# Patient Record
Sex: MALE | Race: BLACK OR AFRICAN AMERICAN | NOT HISPANIC OR LATINO | Employment: FULL TIME | URBAN - METROPOLITAN AREA
[De-identification: names, ages, dates, MRNs, and addresses within clinical notes are randomized per-mention and may not be internally consistent; named-entity substitution may affect disease eponyms.]

---

## 2017-03-13 ENCOUNTER — HOSPITAL ENCOUNTER (EMERGENCY)
Facility: HOSPITAL | Age: 42
Discharge: HOME | End: 2017-03-13
Attending: EMERGENCY MEDICINE
Payer: COMMERCIAL

## 2017-03-13 VITALS
BODY MASS INDEX: 34.81 KG/M2 | RESPIRATION RATE: 18 BRPM | WEIGHT: 257 LBS | OXYGEN SATURATION: 100 % | HEART RATE: 93 BPM | SYSTOLIC BLOOD PRESSURE: 195 MMHG | TEMPERATURE: 98 F | HEIGHT: 72 IN | DIASTOLIC BLOOD PRESSURE: 110 MMHG

## 2017-03-13 DIAGNOSIS — R56.9 SEIZURE: Primary | ICD-10-CM

## 2017-03-13 DIAGNOSIS — R73.9 HYPERGLYCEMIA: ICD-10-CM

## 2017-03-13 DIAGNOSIS — Z91.199 NONCOMPLIANCE: ICD-10-CM

## 2017-03-13 DIAGNOSIS — I10 ESSENTIAL HYPERTENSION: ICD-10-CM

## 2017-03-13 LAB
ALBUMIN SERPL BCP-MCNC: 3.8 G/DL
ALP SERPL-CCNC: 132 U/L
ALT SERPL W/O P-5'-P-CCNC: 48 U/L
ANION GAP SERPL CALC-SCNC: 8 MMOL/L
AST SERPL-CCNC: 27 U/L
BACTERIA #/AREA URNS HPF: NORMAL /HPF
BASOPHILS # BLD AUTO: 0.02 K/UL
BASOPHILS NFR BLD: 0.2 %
BILIRUB SERPL-MCNC: 0.5 MG/DL
BILIRUB UR QL STRIP: NEGATIVE
BUN SERPL-MCNC: 10 MG/DL
CALCIUM SERPL-MCNC: 9.2 MG/DL
CHLORIDE SERPL-SCNC: 104 MMOL/L
CLARITY UR: CLEAR
CO2 SERPL-SCNC: 27 MMOL/L
COLOR UR: YELLOW
CREAT SERPL-MCNC: 1.1 MG/DL
DIFFERENTIAL METHOD: ABNORMAL
EOSINOPHIL # BLD AUTO: 0 K/UL
EOSINOPHIL NFR BLD: 0.5 %
ERYTHROCYTE [DISTWIDTH] IN BLOOD BY AUTOMATED COUNT: 12.8 %
EST. GFR  (AFRICAN AMERICAN): >60 ML/MIN/1.73 M^2
EST. GFR  (NON AFRICAN AMERICAN): >60 ML/MIN/1.73 M^2
GLUCOSE SERPL-MCNC: 333 MG/DL
GLUCOSE UR QL STRIP: ABNORMAL
HCT VFR BLD AUTO: 42.4 %
HGB BLD-MCNC: 14.5 G/DL
HGB UR QL STRIP: NEGATIVE
HYALINE CASTS #/AREA URNS LPF: 0 /LPF
KETONES UR QL STRIP: ABNORMAL
LEUKOCYTE ESTERASE UR QL STRIP: NEGATIVE
LYMPHOCYTES # BLD AUTO: 2 K/UL
LYMPHOCYTES NFR BLD: 22.2 %
MCH RBC QN AUTO: 27.6 PG
MCHC RBC AUTO-ENTMCNC: 34.2 %
MCV RBC AUTO: 81 FL
MICROSCOPIC COMMENT: NORMAL
MONOCYTES # BLD AUTO: 0.6 K/UL
MONOCYTES NFR BLD: 6.3 %
NEUTROPHILS # BLD AUTO: 6.2 K/UL
NEUTROPHILS NFR BLD: 70.8 %
NITRITE UR QL STRIP: NEGATIVE
PH UR STRIP: 6 [PH] (ref 5–8)
PHENYTOIN SERPL-MCNC: <0.1 UG/ML
PLATELET # BLD AUTO: 174 K/UL
PMV BLD AUTO: 11.4 FL
POCT GLUCOSE: 322 MG/DL (ref 70–110)
POTASSIUM SERPL-SCNC: 4.6 MMOL/L
PROT SERPL-MCNC: 7.2 G/DL
PROT UR QL STRIP: ABNORMAL
RBC # BLD AUTO: 5.26 M/UL
RBC #/AREA URNS HPF: 1 /HPF (ref 0–4)
SODIUM SERPL-SCNC: 139 MMOL/L
SP GR UR STRIP: 1.02 (ref 1–1.03)
URN SPEC COLLECT METH UR: ABNORMAL
UROBILINOGEN UR STRIP-ACNC: 1 EU/DL
WBC # BLD AUTO: 8.79 K/UL
WBC #/AREA URNS HPF: 0 /HPF (ref 0–5)
YEAST URNS QL MICRO: NORMAL

## 2017-03-13 PROCEDURE — 81000 URINALYSIS NONAUTO W/SCOPE: CPT

## 2017-03-13 PROCEDURE — 80053 COMPREHEN METABOLIC PANEL: CPT

## 2017-03-13 PROCEDURE — 85025 COMPLETE CBC W/AUTO DIFF WBC: CPT

## 2017-03-13 PROCEDURE — 80185 ASSAY OF PHENYTOIN TOTAL: CPT

## 2017-03-13 PROCEDURE — 82962 GLUCOSE BLOOD TEST: CPT

## 2017-03-13 PROCEDURE — 99284 EMERGENCY DEPT VISIT MOD MDM: CPT | Mod: 25

## 2017-03-13 PROCEDURE — 96361 HYDRATE IV INFUSION ADD-ON: CPT

## 2017-03-13 PROCEDURE — 96360 HYDRATION IV INFUSION INIT: CPT

## 2017-03-13 PROCEDURE — 25000003 PHARM REV CODE 250: Performed by: EMERGENCY MEDICINE

## 2017-03-13 RX ORDER — AMLODIPINE BESYLATE 10 MG/1
10 TABLET ORAL DAILY
Qty: 30 TABLET | Refills: 0 | Status: SHIPPED | OUTPATIENT
Start: 2017-03-13 | End: 2017-10-02

## 2017-03-13 RX ORDER — CLONIDINE HYDROCHLORIDE 0.1 MG/1
0.1 TABLET ORAL 2 TIMES DAILY
Qty: 60 TABLET | Refills: 0 | Status: SHIPPED | OUTPATIENT
Start: 2017-03-13 | End: 2017-04-12

## 2017-03-13 RX ORDER — LABETALOL 100 MG/1
100 TABLET, FILM COATED ORAL EVERY 12 HOURS
Qty: 60 TABLET | Refills: 0 | Status: SHIPPED | OUTPATIENT
Start: 2017-03-13 | End: 2017-04-12

## 2017-03-13 RX ORDER — CLONIDINE HYDROCHLORIDE 0.2 MG/1
0.2 TABLET ORAL
Status: COMPLETED | OUTPATIENT
Start: 2017-03-13 | End: 2017-03-13

## 2017-03-13 RX ORDER — METFORMIN HYDROCHLORIDE 500 MG/1
500 TABLET ORAL 2 TIMES DAILY WITH MEALS
Qty: 60 TABLET | Refills: 0 | Status: SHIPPED | OUTPATIENT
Start: 2017-03-13 | End: 2017-04-12

## 2017-03-13 RX ORDER — PHENYTOIN SODIUM 100 MG/1
200 CAPSULE, EXTENDED RELEASE ORAL 3 TIMES DAILY
Qty: 100 CAPSULE | Refills: 0 | Status: SHIPPED | OUTPATIENT
Start: 2017-03-13 | End: 2017-10-02 | Stop reason: SDUPTHER

## 2017-03-13 RX ORDER — PHENYTOIN SODIUM 100 MG/1
400 CAPSULE, EXTENDED RELEASE ORAL
Status: COMPLETED | OUTPATIENT
Start: 2017-03-13 | End: 2017-03-13

## 2017-03-13 RX ADMIN — CLONIDINE HYDROCHLORIDE 0.2 MG: 0.2 TABLET ORAL at 03:03

## 2017-03-13 RX ADMIN — PHENYTOIN SODIUM 400 MG: 100 CAPSULE, EXTENDED RELEASE ORAL at 03:03

## 2017-03-13 RX ADMIN — SODIUM CHLORIDE 1000 ML: 0.9 INJECTION, SOLUTION INTRAVENOUS at 01:03

## 2017-03-13 NOTE — DISCHARGE INSTRUCTIONS
"  Recurrent Seizure (Adult)    You have had another seizure today. A common cause of seizures that keep happening (recurrent seizures) is missing doses of seizure medicine. But sometimes seizures are hard to control even when you take the medicine correctly. If this is the case for you, your healthcare provider may need to increase your dosage. Or you may need to add or change to another medicine.  Home care  Follow these tips when caring for yourself at home. For this seizure:  · Seizures arent predictable. So avoid doing anything that might cause danger to you or other people if you have another seizure. Until the seizures are under good control, take these precautions:  ¨ Dont drive, ride a motorcycle, or ride a bike.  ¨ Dont operate dangerous equipment such as power tools  ¨ Take showers instead of baths.  ¨ Dont swim or climb ladders, trees, or roofs.  · Tell your close friends and relatives about your seizure. Teach them what to do for you if it happens again.  · If medicine was prescribed to prevent seizures, take it exactly as directed. It does not work when taken "as needed." Missing doses will increase the risk of having another seizure.  · If you miss a dose, take the missed dose as soon as you remember. If it is almost time for your next dose, skip the missed dose. Restart the medicine at your next scheduled time. Dont take extra medicine to make up the missed dose.  · Wear a "Medic-Alert" bracelet to let emergency personnel know about your condition.  · Follow a regular sleep schedule such that you get at least 6 to 8 hours of restful sleep every night. This is especially important when you are sick with a cold or flu and/or another type of infection.  For future seizures, if you are alone:  If you feel a seizure coming on, lie down on a bed or on the floor with something soft under your head. Lie on your left side, not on your back. This will keep you from falling. It will also let fluid drain out " of your mouth and prevent choking. Be sure you are clear of any objects that might injure you during the seizure. Call for help if there is time.  For future seizures, if someone is with you:  The person should help you get into a safe position and call for help. The person shouldnt try to force anything in your mouth once the seizure begins. This could harm your teeth or jaw.  Follow-up care  Follow up with your healthcare provider. Keep a seizure calendar to record how often you have a seizure. If you are being started on anti-seizure medicine, make sure that you use additional birth control. Seizure medicine can affect how well birth control pills work, and you could become pregnant. Avoid alcohol until your doctor tells you its OK.  Note: For the safety of yourself and others on the road, certain states require that the treating doctor tell the Public Health Department about any adult who is treated for a seizure and is at risk of more seizures. In this case, the Department of Motor Vehicles will be told. A restriction will be put on your s license until a doctor gives you medical clearance to drive again. Contact your treating doctor to find out if your state requires the reporting of patients with a seizures condition.  When to seek medical advice  Call your healthcare provider right away if any of these occur:  · Seizures happen more often or last longer than usual  · A seizure lasts over 5 minutes  · You dont wake up between seizures  · Confusion that lasts more than 30 minutes after a seizure  · Injury during a seizure  · Fever over 100.4ºF (38.0ºC), or as advised  · Unusual irritability, drowsiness, or confusion  · Stiff or painful neck  · Headache that gets worse   Date Last Reviewed: 8/1/2016  © 3780-0771 [employer]. [address] [place] [id number]. All rights reserved. This information is not intended as a substitute for professional medical care. Always follow your  healthcare professional's instructions.

## 2017-03-13 NOTE — ED PROVIDER NOTES
"SCRIBE #1 NOTE: I, Daniela Goldberg, am scribing for, and in the presence of, Onur Felipe MD. I have scribed the entire note.      History      Chief Complaint   Patient presents with    Seizures     c/o seizure x's 1 PTA. hx of seizures    Hyperglycemia     c/o "blood sugars high for a while."       Review of patient's allergies indicates:  No Known Allergies     HPI   HPI    3/13/2017, 1:46 PM   History obtained from the patient      History of Present Illness: Vadim Devries III is a 41 y.o. male patient who presents to the Emergency Department for seizures which onset this morning. Patient reports having 1 seizure this AM, states he takes Dilantin for seizures. Sx have been intermittent and moderate in severity. No mitigating or exacerbating factors reported.  Patient states his CBG has been between 300-600 within the last few weeks. Associated sx include fatigue. Pt denies any fever, chills, nausea, vomiting, dysuria, hematuria, urinary frequency, HA, dizziness, and all other sx. No further complaints or concerns at this time.        Arrival mode: ambulance    PCP: Petr Mckeon MD       Past Medical History:  Past Medical History:   Diagnosis Date    Diabetes mellitus     Hypertension     Seizures     Stroke        Past Surgical History:  Past surgical history reviewed not relevant      Family History:  Family history reviewed not relevant      Social History:  Social History     Social History Main Topics    Smoking status: Never Smoker    Smokeless tobacco: Not on file    Alcohol use No    Drug use: No    Sexual activity: Not on file       ROS   Review of Systems   Constitutional: Positive for fatigue. Negative for chills and fever.   HENT: Negative for sore throat.    Respiratory: Negative for shortness of breath.    Cardiovascular: Negative for chest pain.   Gastrointestinal: Negative for nausea and vomiting.   Genitourinary: Negative for dysuria, frequency and hematuria. "   Musculoskeletal: Negative for back pain.   Skin: Negative for rash.   Neurological: Positive for seizures. Negative for dizziness, weakness and headaches.   Hematological: Does not bruise/bleed easily.       Physical Exam    Initial Vitals   BP Pulse Resp Temp SpO2   03/13/17 1245 03/13/17 1245 03/13/17 1245 03/13/17 1245 03/13/17 1245   192/120 98 18 97.6 °F (36.4 °C) 98 %      Physical Exam  Nursing Notes and Vital Signs Reviewed.  Constitutional: Patient is in no acute distress. Awake and alert. Well-developed and well-nourished.  Head: Atraumatic. Normocephalic.  Eyes: PERRL. EOM intact. Conjunctivae are not pale. No scleral icterus.  ENT: Mucous membranes are moist. Oropharynx is clear and symmetric.    Neck: Supple. Full ROM. No lymphadenopathy.  Cardiovascular: Regular rate. Regular rhythm. No murmurs, rubs, or gallops. Distal pulses are 2+ and symmetri.  Pulmonary/Chest: No respiratory distress. Clear to auscultation bilaterally. No wheezing, rales, or rhonchi.  Abdominal: Soft and non-distended.  There is no tenderness.  No rebound, guarding, or rigidity. Good bowel sounds.  Genitourinary: No CVA tenderness  Musculoskeletal: Moves all extremities. No obvious deformities. No edema. No calf tenderness.  Skin: Warm and dry.  Neurological:  Alert, awake, and appropriate.  Normal speech.  No acute focal neurological deficits are appreciated.  Psychiatric: Normal affect. Good eye contact. Appropriate in content.    ED Course    Procedures  ED Vital Signs:  Vitals:    03/13/17 1245 03/13/17 1330 03/13/17 1445 03/13/17 1500   BP: (!) 192/120 (!) 180/96 (!) 180/101 (!) 191/126   Pulse: 98 84 82 87   Resp: 18 (!) 22 20 17   Temp: 97.6 °F (36.4 °C)      TempSrc: Oral      SpO2: 98% 98% 99% 99%   Weight: 116.6 kg (257 lb)      Height: 6' (1.829 m)       03/13/17 1515   BP:    Pulse:    Resp:    Temp: 98.4 °F (36.9 °C)   TempSrc: Oral   SpO2:    Weight:    Height:        Abnormal Lab Results:  Labs Reviewed   CBC W/  AUTO DIFFERENTIAL - Abnormal; Notable for the following:        Result Value    MCV 81 (*)     All other components within normal limits   COMPREHENSIVE METABOLIC PANEL - Abnormal; Notable for the following:     Glucose 333 (*)     ALT 48 (*)     All other components within normal limits   PHENYTOIN LEVEL, TOTAL - Abnormal; Notable for the following:     Phenytoin Lvl <0.1 (*)     All other components within normal limits   URINALYSIS - Abnormal; Notable for the following:     Protein, UA 1+ (*)     Glucose, UA 3+ (*)     Ketones, UA Trace (*)     All other components within normal limits   POCT GLUCOSE - Abnormal; Notable for the following:     POCT Glucose 322 (*)     All other components within normal limits   URINALYSIS MICROSCOPIC   POCT GLUCOSE MONITORING CONTINUOUS        All Lab Results:  Results for orders placed or performed during the hospital encounter of 03/13/17   CBC auto differential   Result Value Ref Range    WBC 8.79 3.90 - 12.70 K/uL    RBC 5.26 4.60 - 6.20 M/uL    Hemoglobin 14.5 14.0 - 18.0 g/dL    Hematocrit 42.4 40.0 - 54.0 %    MCV 81 (L) 82 - 98 fL    MCH 27.6 27.0 - 31.0 pg    MCHC 34.2 32.0 - 36.0 %    RDW 12.8 11.5 - 14.5 %    Platelets 174 150 - 350 K/uL    MPV 11.4 9.2 - 12.9 fL    Gran # 6.2 1.8 - 7.7 K/uL    Lymph # 2.0 1.0 - 4.8 K/uL    Mono # 0.6 0.3 - 1.0 K/uL    Eos # 0.0 0.0 - 0.5 K/uL    Baso # 0.02 0.00 - 0.20 K/uL    Gran% 70.8 38.0 - 73.0 %    Lymph% 22.2 18.0 - 48.0 %    Mono% 6.3 4.0 - 15.0 %    Eosinophil% 0.5 0.0 - 8.0 %    Basophil% 0.2 0.0 - 1.9 %    Differential Method Automated    Comprehensive metabolic panel   Result Value Ref Range    Sodium 139 136 - 145 mmol/L    Potassium 4.6 3.5 - 5.1 mmol/L    Chloride 104 95 - 110 mmol/L    CO2 27 23 - 29 mmol/L    Glucose 333 (H) 70 - 110 mg/dL    BUN, Bld 10 6 - 20 mg/dL    Creatinine 1.1 0.5 - 1.4 mg/dL    Calcium 9.2 8.7 - 10.5 mg/dL    Total Protein 7.2 6.0 - 8.4 g/dL    Albumin 3.8 3.5 - 5.2 g/dL    Total Bilirubin 0.5  0.1 - 1.0 mg/dL    Alkaline Phosphatase 132 55 - 135 U/L    AST 27 10 - 40 U/L    ALT 48 (H) 10 - 44 U/L    Anion Gap 8 8 - 16 mmol/L    eGFR if African American >60 >60 mL/min/1.73 m^2    eGFR if non African American >60 >60 mL/min/1.73 m^2   Phenytoin level, total   Result Value Ref Range    Phenytoin Lvl <0.1 (L) 10.0 - 20.0 ug/mL   Urinalysis   Result Value Ref Range    Specimen UA Urine, Clean Catch     Color, UA Yellow Yellow, Straw, Kinza    Appearance, UA Clear Clear    pH, UA 6.0 5.0 - 8.0    Specific Gravity, UA 1.020 1.005 - 1.030    Protein, UA 1+ (A) Negative    Glucose, UA 3+ (A) Negative    Ketones, UA Trace (A) Negative    Bilirubin (UA) Negative Negative    Occult Blood UA Negative Negative    Nitrite, UA Negative Negative    Urobilinogen, UA 1.0 <2.0 EU/dL    Leukocytes, UA Negative Negative   Urinalysis Microscopic   Result Value Ref Range    RBC, UA 1 0 - 4 /hpf    WBC, UA 0 0 - 5 /hpf    Bacteria, UA Rare None-Occ /hpf    Yeast, UA None None    Hyaline Casts, UA 0 0-1/lpf /lpf    Microscopic Comment SEE COMMENT    POCT glucose   Result Value Ref Range    POCT Glucose 322 (H) 70 - 110 mg/dL         Imaging Results:  Imaging Results         X-Ray Chest 1 View (Final result) Result time:  03/13/17 14:16:45    Final result by Carley Paredes MD (03/13/17 14:16:45)    Impression:      No acute findings.      Electronically signed by: CARLEY PAREDES MD  Date:     03/13/17  Time:    14:16     Narrative:    EXAM: CXR one view    CLINICAL HISTORY: Unspecified convulsions..    COMPARISON STUDIES: Chest x-ray 06/13/2016    FINDINGS:  Cardiomegaly.  Mild elevation right hemidiaphragm.  Lungs appear clear..  Ribs appear intact .                      The Emergency Provider reviewed the vital signs and test results, which are outlined above.    ED Discussion     4:20 PM: Discussed with pt all pertinent ED information and results. Discussed pt dx and plan of tx. Gave pt all f/u and return to the ED  instructions. All questions and concerns were addressed at this time. Pt expresses understanding of information and instructions, and is comfortable with plan to discharge. Pt is stable for discharge.    I discussed with patient and/or family/caretaker that evaluation in the ED does not suggest any emergent or life threatening medical conditions requiring immediate intervention beyond what was provided in the ED, and I believe patient is safe for discharge.  Regardless, an unremarkable evaluation in the ED does not preclude the development or presence of a serious of life threatening condition. As such, patient was instructed to return immediately for any worsening or change in current symptoms.      ED Medication(s):  Medications   sodium chloride 0.9% bolus 1,000 mL (1,000 mLs Intravenous New Bag 3/13/17 1352)   phenytoin (DILANTIN) ER capsule 400 mg (400 mg Oral Given 3/13/17 1555)   cloNIDine tablet 0.2 mg (0.2 mg Oral Given 3/13/17 1555)       New Prescriptions    AMLODIPINE (NORVASC) 10 MG TABLET    Take 1 tablet (10 mg total) by mouth once daily.    CLONIDINE (CATAPRES) 0.1 MG TABLET    Take 1 tablet (0.1 mg total) by mouth 2 (two) times daily.    LABETALOL (NORMODYNE) 100 MG TABLET    Take 1 tablet (100 mg total) by mouth every 12 (twelve) hours.    METFORMIN (GLUCOPHAGE) 500 MG TABLET    Take 1 tablet (500 mg total) by mouth 2 (two) times daily with meals.    PHENYTOIN (DILANTIN) 100 MG ER CAPSULE    Take 2 capsules (200 mg total) by mouth 3 (three) times daily.       Follow-up Information     Follow up with Petr Mckeon MD. Call in 1 day.    Specialty:  Family Medicine    Contact information:    [id number] [facility] [address]  [phone]            Pre-hypertension/Hypertension: The pt has been informed that they may have pre-hypertension or hypertension based on a blood pressure reading in the ED. I recommend that the pt call the PCP listed on their discharge instructions or  a physician of their choice this week to arrange f/u for further evaluation of possible pre-hypertension or hypertension.     Medical Decision Making    Medical Decision Making:   Clinical Tests:   Lab Tests: Ordered and Reviewed  Radiological Study: Ordered and Reviewed           Scribe Attestation:   Scribe #1: I performed the above scribed service and the documentation accurately describes the services I performed. I attest to the accuracy of the note.    Attending:   Physician Attestation Statement for Scribe #1: I, Onur Felipe MD, personally performed the services described in this documentation, as scribed by Daniela Goldberg, in my presence, and it is both accurate and complete.          Clinical Impression       ICD-10-CM ICD-9-CM   1. Seizure R56.9 780.39   2. Hyperglycemia R73.9 790.29   3. Essential hypertension I10 401.9   4. Noncompliance Z91.19 V15.81       Disposition:   Disposition: Discharged  Condition: Stable         Onur Felipe MD  03/14/17 3675

## 2017-03-13 NOTE — ED AVS SNAPSHOT
[facility]  [address]  [place] [address]               Vadim Devries III   3/13/2017  1:15 PM   ED    Description:  Male : 1975   Department:  [facility]            Your Care was Coordinated By:     Provider Role From To    Onur Felipe MD Attending Provider [phone] --      Reason for Visit     Seizures     Hyperglycemia           Diagnoses this Visit        Comments    Seizure    -  Primary     Hyperglycemia           ED Disposition     ED Disposition Condition Comment    Discharge             To Do List           Follow-up Information     Follow up with Petr Mckeon MD. Call in 1 day.    Specialty:  Family Medicine    Contact information:    [address] [facility] [id number] [phone]         These Medications        Disp Refills Start End    phenytoin (DILANTIN) 100 MG ER capsule 100 capsule 0 3/13/2017     Take 2 capsules (200 mg total) by mouth 3 (three) times daily. - Oral    amlodipine (NORVASC) 10 MG tablet 30 tablet 0 3/13/2017 3/13/2018    Take 1 tablet (10 mg total) by mouth once daily. - Oral    cloNIDine (CATAPRES) 0.1 MG tablet 60 tablet 0 3/13/2017 2017    Take 1 tablet (0.1 mg total) by mouth 2 (two) times daily. - Oral    metformin (GLUCOPHAGE) 500 MG tablet 60 tablet 0 3/13/2017 2017    Take 1 tablet (500 mg total) by mouth 2 (two) times daily with meals. - Oral    labetalol (NORMODYNE) 100 MG tablet 60 tablet 0 3/13/2017 2017    Take 1 tablet (100 mg total) by mouth every 12 (twelve) hours. - Oral      [facility]     [facility] On Call Nurse Care Line -  Assistance  Registered nurses in the [facility] On Call Center provide clinical advisement, health education, appointment booking, and other advisory services.  Call for this free service at [phone].             Medications           Message regarding Medications     Verify the changes  and/or additions to your medication regime listed below are the same as discussed with your clinician today.  If any of these changes or additions are incorrect, please notify your healthcare provider.        START taking these NEW medications        Refills    phenytoin (DILANTIN) 100 MG ER capsule 0    Sig: Take 2 capsules (200 mg total) by mouth 3 (three) times daily.    Class: Print    Route: Oral    amlodipine (NORVASC) 10 MG tablet 0    Sig: Take 1 tablet (10 mg total) by mouth once daily.    Class: Print    Route: Oral    cloNIDine (CATAPRES) 0.1 MG tablet 0    Sig: Take 1 tablet (0.1 mg total) by mouth 2 (two) times daily.    Class: Print    Route: Oral    metformin (GLUCOPHAGE) 500 MG tablet 0    Sig: Take 1 tablet (500 mg total) by mouth 2 (two) times daily with meals.    Class: Print    Route: Oral    labetalol (NORMODYNE) 100 MG tablet 0    Sig: Take 1 tablet (100 mg total) by mouth every 12 (twelve) hours.    Class: Print    Route: Oral      These medications were administered today        Dose Freq    sodium chloride 0.9% bolus 1,000 mL 1,000 mL ED 1 Time    Sig: Inject 1,000 mLs into the vein ED 1 Time.    Class: Normal    Route: Intravenous    phenytoin (DILANTIN) ER capsule 400 mg 400 mg ED 1 Time    Sig: Take 4 capsules (400 mg total) by mouth ED 1 Time.    Class: Normal    Route: Oral    cloNIDine tablet 0.2 mg 0.2 mg ED 1 Time    Sig: Take 1 tablet (0.2 mg total) by mouth ED 1 Time.    Class: Normal    Route: Oral           Verify that the below list of medications is an accurate representation of the medications you are currently taking.  If none reported, the list may be blank. If incorrect, please contact your healthcare provider. Carry this list with you in case of emergency.           Current Medications     amlodipine (NORVASC) 10 MG tablet Take 1 tablet (10 mg total) by mouth once daily.    amlodipine (NORVASC) 10 MG tablet Take 1 tablet (10 mg total) by mouth once daily.    cloNIDine  (CATAPRES) 0.1 MG tablet Take 1 tablet (0.1 mg total) by mouth 2 (two) times daily.    cloNIDine (CATAPRES) 0.1 MG tablet Take 1 tablet (0.1 mg total) by mouth 2 (two) times daily.    cloNIDine tablet 0.2 mg Take 1 tablet (0.2 mg total) by mouth ED 1 Time.    labetalol (NORMODYNE) 100 MG tablet Take 1 tablet (100 mg total) by mouth 2 (two) times daily.    labetalol (NORMODYNE) 100 MG tablet Take 1 tablet (100 mg total) by mouth every 12 (twelve) hours.    lisinopril 10 MG tablet Take 1 tablet (10 mg total) by mouth once daily.    metformin (GLUCOPHAGE) 500 MG tablet Take 1 tablet (500 mg total) by mouth 2 (two) times daily with meals.    metformin (GLUCOPHAGE) 500 MG tablet Take 1 tablet (500 mg total) by mouth 2 (two) times daily with meals.    phenytoin (DILANTIN) 100 MG ER capsule Take 2 capsules (200 mg total) by mouth 3 (three) times daily.    phenytoin (DILANTIN) 100 MG ER capsule Take 2 capsules (200 mg total) by mouth 3 (three) times daily.    phenytoin (DILANTIN) ER capsule 400 mg Take 4 capsules (400 mg total) by mouth ED 1 Time.    simvastatin (ZOCOR) 10 MG tablet Take 1 tablet (10 mg total) by mouth every evening.           Clinical Reference Information           Your Vitals Were     BP Pulse Temp Resp Height Weight    191/126 87 98.4 °F (36.9 °C) (Oral) 17 6' (1.829 m) 116.6 kg (257 lb)    SpO2 BMI             99% 34.86 kg/m2         Allergies as of 3/13/2017     No Known Allergies      Immunizations Administered on Date of Encounter - 3/13/2017     None      ED Micro, Lab, POCT     Start Ordered       Status Ordering Provider    03/13/17 1344 03/13/17 1343  CBC auto differential  STAT      Final result     03/13/17 1344 03/13/17 1343  Comprehensive metabolic panel  STAT      Final result     03/13/17 1344 03/13/17 1343  Phenytoin level, total  Once      Final result     03/13/17 1344 03/13/17 1343  Urinalysis  STAT      Final result     03/13/17 1343 03/13/17 1343  Urinalysis Microscopic  Once       "Final result     03/13/17 1341 03/13/17 1341  POCT glucose  Once      Final result     03/13/17 1320 03/13/17 1319  POCT glucose  Once      Acknowledged       ED Imaging Orders     Start Ordered       Status Ordering Provider    03/13/17 1344 03/13/17 1343  X-Ray Chest 1 View  1 time imaging      Final result         Discharge Instructions         Recurrent Seizure (Adult)    You have had another seizure today. A common cause of seizures that keep happening (recurrent seizures) is missing doses of seizure medicine. But sometimes seizures are hard to control even when you take the medicine correctly. If this is the case for you, your healthcare provider may need to increase your dosage. Or you may need to add or change to another medicine.  Home care  Follow these tips when caring for yourself at home. For this seizure:  · Seizures arent predictable. So avoid doing anything that might cause danger to you or other people if you have another seizure. Until the seizures are under good control, take these precautions:  ¨ Dont drive, ride a motorcycle, or ride a bike.  ¨ Dont operate dangerous equipment such as power tools  ¨ Take showers instead of baths.  ¨ Dont swim or climb ladders, trees, or roofs.  · Tell your close friends and relatives about your seizure. Teach them what to do for you if it happens again.  · If medicine was prescribed to prevent seizures, take it exactly as directed. It does not work when taken "as needed." Missing doses will increase the risk of having another seizure.  · If you miss a dose, take the missed dose as soon as you remember. If it is almost time for your next dose, skip the missed dose. Restart the medicine at your next scheduled time. Dont take extra medicine to make up the missed dose.  · Wear a "Medic-Alert" bracelet to let emergency personnel know about your condition.  · Follow a regular sleep schedule such that you get at least 6 to 8 hours of restful sleep every night. " This is especially important when you are sick with a cold or flu and/or another type of infection.  For future seizures, if you are alone:  If you feel a seizure coming on, lie down on a bed or on the floor with something soft under your head. Lie on your left side, not on your back. This will keep you from falling. It will also let fluid drain out of your mouth and prevent choking. Be sure you are clear of any objects that might injure you during the seizure. Call for help if there is time.  For future seizures, if someone is with you:  The person should help you get into a safe position and call for help. The person shouldnt try to force anything in your mouth once the seizure begins. This could harm your teeth or jaw.  Follow-up care  Follow up with your healthcare provider. Keep a seizure calendar to record how often you have a seizure. If you are being started on anti-seizure medicine, make sure that you use additional birth control. Seizure medicine can affect how well birth control pills work, and you could become pregnant. Avoid alcohol until your doctor tells you its OK.  Note: For the safety of yourself and others on the road, certain states require that the treating doctor tell the Public Health Department about any adult who is treated for a seizure and is at risk of more seizures. In this case, the Department of Motor Vehicles will be told. A restriction will be put on your s license until a doctor gives you medical clearance to drive again. Contact your treating doctor to find out if your state requires the reporting of patients with a seizures condition.  When to seek medical advice  Call your healthcare provider right away if any of these occur:  · Seizures happen more often or last longer than usual  · A seizure lasts over 5 minutes  · You dont wake up between seizures  · Confusion that lasts more than 30 minutes after a seizure  · Injury during a seizure  · Fever over 100.4ºF (38.0ºC),  or as advised  · Unusual irritability, drowsiness, or confusion  · Stiff or painful neck  · Headache that gets worse   Date Last Reviewed: 8/1/2016  © 8431-0724 [employer] [address] [place] [address], [place] [address]. All rights reserved. This information is not intended as a substitute for professional medical care. Always follow your healthcare professional's instructions.          [facility] Sign-Up     Activating your [facility] account is as easy as 1-2-3!     1) Visit [email or web address], select Sign Up Now, enter this activation code and your date of birth, then select Next.  [email or web address]  Expires: 4/27/2017  3:44 PM      2) Create a username and password to use when you visit [facility] in the future and select a security question in case you lose your password and select Next.    3) Enter your e-mail address and click Sign Up!    Additional Information  If you have questions, please e-mail [email or web address] or call [phone] to talk to our [facility] staff. Remember, [facility] is NOT to be used for urgent needs. For medical emergencies, dial 911.          [facility] complies with applicable Federal civil rights laws and does not discriminate on the basis of race, color, national origin, age, disability, or sex.        Language Assistance Services     ATTENTION: Language assistance services are available, free of charge. Please call [phone].      ATENCIÓN: Si habla español, tiene a clark disposición servicios gratuitos de asistencia lingüística. Llame al [phone].     CHÚ Ý: N?u b?n nói Ti?ng Vi?t, có các d?ch v? h? tr? ngôn ng? mi?n phí dành cho b?n. G?i s? [phone].

## 2017-03-13 NOTE — ED NOTES
"Pt requesting oral dilantin instead of IV cerebex. Pt states his body feels like "it is on fire" when he receives it IV. Discussed patient's request with Dr. Felipe. Dr. Felipe to change orders.  "

## 2017-04-11 ENCOUNTER — HOSPITAL ENCOUNTER (EMERGENCY)
Facility: HOSPITAL | Age: 42
Discharge: HOME | End: 2017-04-12
Attending: EMERGENCY MEDICINE
Payer: COMMERCIAL

## 2017-04-11 DIAGNOSIS — R91.1 PULMONARY NODULE: ICD-10-CM

## 2017-04-11 DIAGNOSIS — R07.9 CHEST PAIN: Primary | ICD-10-CM

## 2017-04-11 LAB
ALBUMIN SERPL BCP-MCNC: 3.6 G/DL
ALP SERPL-CCNC: 144 U/L
ALT SERPL W/O P-5'-P-CCNC: 50 U/L
ANION GAP SERPL CALC-SCNC: 11 MMOL/L
AST SERPL-CCNC: 25 U/L
B-OH-BUTYR BLD STRIP-SCNC: 0.1 MMOL/L
BACTERIA #/AREA URNS HPF: NORMAL /HPF
BASOPHILS # BLD AUTO: 0.03 K/UL
BASOPHILS NFR BLD: 0.4 %
BILIRUB SERPL-MCNC: 0.4 MG/DL
BILIRUB UR QL STRIP: NEGATIVE
BUN SERPL-MCNC: 9 MG/DL
CALCIUM SERPL-MCNC: 9.3 MG/DL
CHLORIDE SERPL-SCNC: 105 MMOL/L
CLARITY UR: CLEAR
CO2 SERPL-SCNC: 20 MMOL/L
COLOR UR: YELLOW
CREAT SERPL-MCNC: 1.2 MG/DL
DIFFERENTIAL METHOD: ABNORMAL
EOSINOPHIL # BLD AUTO: 0.2 K/UL
EOSINOPHIL NFR BLD: 1.8 %
ERYTHROCYTE [DISTWIDTH] IN BLOOD BY AUTOMATED COUNT: 12.5 %
EST. GFR  (AFRICAN AMERICAN): >60 ML/MIN/1.73 M^2
EST. GFR  (NON AFRICAN AMERICAN): >60 ML/MIN/1.73 M^2
GLUCOSE SERPL-MCNC: 439 MG/DL
GLUCOSE UR QL STRIP: ABNORMAL
HCT VFR BLD AUTO: 40.2 %
HGB BLD-MCNC: 14.1 G/DL
HGB UR QL STRIP: NEGATIVE
INR PPP: 1
KETONES UR QL STRIP: ABNORMAL
LEUKOCYTE ESTERASE UR QL STRIP: NEGATIVE
LYMPHOCYTES # BLD AUTO: 2.3 K/UL
LYMPHOCYTES NFR BLD: 27.6 %
MCH RBC QN AUTO: 27.8 PG
MCHC RBC AUTO-ENTMCNC: 35.1 %
MCV RBC AUTO: 79 FL
MICROSCOPIC COMMENT: NORMAL
MONOCYTES # BLD AUTO: 0.6 K/UL
MONOCYTES NFR BLD: 7.6 %
NEUTROPHILS # BLD AUTO: 5.2 K/UL
NEUTROPHILS NFR BLD: 62.6 %
NITRITE UR QL STRIP: NEGATIVE
PH UR STRIP: 6 [PH] (ref 5–8)
PLATELET # BLD AUTO: 161 K/UL
PMV BLD AUTO: 11 FL
POTASSIUM SERPL-SCNC: 3.5 MMOL/L
PROT SERPL-MCNC: 7 G/DL
PROT UR QL STRIP: ABNORMAL
PROTHROMBIN TIME: 10.3 SEC
RBC # BLD AUTO: 5.08 M/UL
SODIUM SERPL-SCNC: 136 MMOL/L
SP GR UR STRIP: 1.01 (ref 1–1.03)
TROPONIN I SERPL DL<=0.01 NG/ML-MCNC: <0.006 NG/ML
TROPONIN I SERPL DL<=0.01 NG/ML-MCNC: <0.006 NG/ML
URN SPEC COLLECT METH UR: ABNORMAL
UROBILINOGEN UR STRIP-ACNC: 1 EU/DL
WBC # BLD AUTO: 8.37 K/UL
YEAST URNS QL MICRO: NORMAL

## 2017-04-11 PROCEDURE — 80053 COMPREHEN METABOLIC PANEL: CPT

## 2017-04-11 PROCEDURE — 25000003 PHARM REV CODE 250: Performed by: EMERGENCY MEDICINE

## 2017-04-11 PROCEDURE — 85025 COMPLETE CBC W/AUTO DIFF WBC: CPT

## 2017-04-11 PROCEDURE — 81000 URINALYSIS NONAUTO W/SCOPE: CPT

## 2017-04-11 PROCEDURE — 99284 EMERGENCY DEPT VISIT MOD MDM: CPT | Mod: 25

## 2017-04-11 PROCEDURE — 84484 ASSAY OF TROPONIN QUANT: CPT

## 2017-04-11 PROCEDURE — 93010 ELECTROCARDIOGRAM REPORT: CPT | Mod: ,,, | Performed by: INTERNAL MEDICINE

## 2017-04-11 PROCEDURE — 93005 ELECTROCARDIOGRAM TRACING: CPT

## 2017-04-11 PROCEDURE — 96374 THER/PROPH/DIAG INJ IV PUSH: CPT

## 2017-04-11 PROCEDURE — 82010 KETONE BODYS QUAN: CPT

## 2017-04-11 PROCEDURE — 83880 ASSAY OF NATRIURETIC PEPTIDE: CPT

## 2017-04-11 PROCEDURE — 96361 HYDRATE IV INFUSION ADD-ON: CPT

## 2017-04-11 PROCEDURE — 63600175 PHARM REV CODE 636 W HCPCS: Performed by: EMERGENCY MEDICINE

## 2017-04-11 PROCEDURE — 85610 PROTHROMBIN TIME: CPT

## 2017-04-11 RX ORDER — HYDRALAZINE HYDROCHLORIDE 20 MG/ML
10 INJECTION INTRAMUSCULAR; INTRAVENOUS
Status: COMPLETED | OUTPATIENT
Start: 2017-04-11 | End: 2017-04-11

## 2017-04-11 RX ADMIN — SODIUM CHLORIDE 1000 ML: 0.9 INJECTION, SOLUTION INTRAVENOUS at 11:04

## 2017-04-11 RX ADMIN — HYDRALAZINE HYDROCHLORIDE 10 MG: 20 INJECTION INTRAMUSCULAR; INTRAVENOUS at 10:04

## 2017-04-11 RX ADMIN — SODIUM CHLORIDE 1000 ML: 0.9 INJECTION, SOLUTION INTRAVENOUS at 10:04

## 2017-04-11 NOTE — ED AVS SNAPSHOT
[facility]  [id number] [place] [address]               Vadim Devries III   2017  9:47 PM   ED    Description:  Male : 1975   Department:  [facility]           Your Care was Coordinated By:     Provider Role From To    Arben Chávez Jr., MD Attending Provider [phone] 17 [phone]    Connor Miramontes MD Attending Provider 17 --      Reason for Visit     Chest Pain           Diagnoses this Visit        Comments    Chest pain    -  Primary     Pulmonary nodule           ED Disposition     ED Disposition Condition Comment    Discharge             To Do List           Follow-up Information     Follow up with Petr Mckeon MD In 2 days.    Specialty:  Family Medicine    Contact information:    [address] [facility] [address]  [phone]          Follow up with [facility].    Specialty:  Emergency Medicine    Why:  As needed, If symptoms worsen    Contact information:    [address] [place] [id number] [phone]      [facility]     [facility] Care [facility] -  Assistance  Unless otherwise directed by your provider, please contact [facility], our nurse care line that is available for  assistance.     Registered nurses in the [facility] provide: appointment scheduling, clinical advisement, health education, and other advisory services.  Call: [phone] (toll free)               Medications           Message regarding Medications     Verify the changes and/or additions to your medication regime listed below are the same as discussed with your clinician today.  If any of these changes or additions are incorrect, please notify your healthcare provider.        These medications were administered today        Dose Freq    hydrALAZINE injection 10 mg 10 mg ED 1 Time    Sig: Inject 0.5 mLs (10 mg total) into the  vein ED 1 Time.    Class: Normal    Route: Intravenous    sodium chloride 0.9% bolus 1,000 mL 1,000 mL ED 1 Time    Sig: Inject 1,000 mLs into the vein ED 1 Time.    Class: Normal    Route: Intravenous    sodium chloride 0.9% bolus 1,000 mL 1,000 mL ED 1 Time    Sig: Inject 1,000 mLs into the vein ED 1 Time.    Class: Normal    Route: Intravenous    sodium chloride 0.9% bolus 1,000 mL 1,000 mL ED 1 Time    Sig: Inject 1,000 mLs into the vein ED 1 Time.    Class: Normal    Route: Intravenous    insulin regular injection 5 Units 5 Units ED 1 Time    Sig: Inject 5 Units into the vein ED 1 Time.    Class: Normal    Route: Intravenous    omnipaque 350 iohexol 100 mL 100 mL IMG once as needed    Sig: Inject 100 mLs into the vein ONCE PRN for contrast.    Class: Normal    Route: Intravenous           Verify that the below list of medications is an accurate representation of the medications you are currently taking.  If none reported, the list may be blank. If incorrect, please contact your healthcare provider. Carry this list with you in case of emergency.           Current Medications     amlodipine (NORVASC) 10 MG tablet Take 1 tablet (10 mg total) by mouth once daily.    amlodipine (NORVASC) 10 MG tablet Take 1 tablet (10 mg total) by mouth once daily.    cloNIDine (CATAPRES) 0.1 MG tablet Take 1 tablet (0.1 mg total) by mouth 2 (two) times daily.    cloNIDine (CATAPRES) 0.1 MG tablet Take 1 tablet (0.1 mg total) by mouth 2 (two) times daily.    labetalol (NORMODYNE) 100 MG tablet Take 1 tablet (100 mg total) by mouth 2 (two) times daily.    labetalol (NORMODYNE) 100 MG tablet Take 1 tablet (100 mg total) by mouth every 12 (twelve) hours.    lisinopril 10 MG tablet Take 1 tablet (10 mg total) by mouth once daily.    metformin (GLUCOPHAGE) 500 MG tablet Take 1 tablet (500 mg total) by mouth 2 (two) times daily with meals.    metformin (GLUCOPHAGE) 500 MG tablet Take 1 tablet (500 mg total) by mouth 2 (two) times daily  with meals.    phenytoin (DILANTIN) 100 MG ER capsule Take 2 capsules (200 mg total) by mouth 3 (three) times daily.    phenytoin (DILANTIN) 100 MG ER capsule Take 2 capsules (200 mg total) by mouth 3 (three) times daily.    simvastatin (ZOCOR) 10 MG tablet Take 1 tablet (10 mg total) by mouth every evening.           Clinical Reference Information           Your Vitals Were     BP Pulse Temp Resp Height Weight    169/95 94 97.6 °F (36.4 °C) (Oral) 17 6' (1.829 m) 116.6 kg (257 lb)    SpO2 BMI             98% 34.86 kg/m2         Allergies as of 4/12/2017     No Known Allergies      Immunizations Administered on Date of Encounter - 4/12/2017     None      ED Micro, Lab, POCT     Start Ordered       Status Ordering Provider    04/12/17 0358 04/12/17 0358  POCT glucose  Once      Final result     04/12/17 0351 04/12/17 0350  Troponin I  STAT      Final result     04/12/17 0224 04/12/17 0224  POCT glucose  Once      Final result     04/12/17 0146 04/12/17 0146  POCT glucose  Once      Final result     04/11/17 2237 04/11/17 2237  Urinalysis  STAT      Final result     04/11/17 2237 04/11/17 2237  Urinalysis Microscopic  Once      Final result     04/11/17 2201 04/11/17 2200  Beta - Hydroxybutyrate, Serum  Once      Final result     04/11/17 2200 04/11/17 2200  CBC auto differential  STAT      Final result     04/11/17 2200 04/11/17 2200  Comprehensive metabolic panel  STAT      Final result     04/11/17 2200 04/11/17 2200  Protime-INR  STAT      Final result     04/11/17 2200 04/11/17 2200  Troponin I  Now then every 3 hours     Comments:  PLEASE REVIEW ORDER START TIME BEFORE MARKING SPECIMEN COLLECTED.   Start Status   04/11/17 2200 Final result   04/12/17 0100 Final result       Acknowledged     04/11/17 2200 04/11/17 2200  B-Type natriuretic peptide (BNP)  STAT      Final result       ED Imaging Orders     Start Ordered       Status Ordering Provider    04/12/17 0209 04/12/17 0208  CTA Chest Non-Coronary (PE Study)   1 time imaging      In process     04/11/17 2200 04/11/17 2200  X-Ray Chest PA And Lateral  1 time imaging      Final result         Discharge Instructions         Uncertain Causes of Chest Pain    Chest pain can happen for a number of reasons. Sometimes the cause can't be determined. If your condition does not seem serious, and your pain does not appear to be coming from your heart, your healthcare provider may recommend watching it closely. Sometimes the signs of a serious problem take more time to appear. Many problems not related to your heart can cause chest pain.These include:  · Musculoskeletal. Costochondritis, an inflammation of the tissues around the ribs that can occur from trauma or overuse injuries  · Respiratory. Pneumonia, pneumothorax, or pneumonitis (inflammation of the lining of the chest and lungs)  · Gastrointestinal. Esophageal reflux, heartburn, or gallbladder disease  · Anxiety and panic disorders  · Nerve compression and neuritis  · Miscellaneous problems such as aortic aneurysm or pulmonary embolism (a blood clot in the lungs)  Home care  After your visit, follow these recommendations:  · Rest today and avoid strenuous activity.  · Take any prescribed medicine as directed.  · Be aware of any recurrent chest pain and notice any changes  Follow-up care  Follow up with your healthcare provider if you do not start to feel better within 24 hours, or as advised.  Call 911  Call 911 if any of these occur:  · A change in the type of pain: if it feels different, becomes more severe, lasts longer, or begins to spread into your shoulder, arm, neck, jaw or back  · Shortness of breath or increased pain with breathing  · Weakness, dizziness, or fainting  · Rapid heart beat  · Crushing sensation in your chest  When to seek medical advice  Call your healthcare provider right away if any of the following occur:  · Cough with dark colored sputum (phlegm) or blood  · Fever of 100.4ºF (38ºC) or higher, or as  directed by your healthcare provider  · Swelling, pain or redness in one leg  · Shortness of breath  Date Last Reviewed: 12/30/2015  © 2157-2671 [employer]. [address] [place] [address], [place] [address]. All rights reserved. This information is not intended as a substitute for professional medical care. Always follow your healthcare professional's instructions.          [facility] Sign-Up     Activating your [facility] account is as easy as 1-2-3!     1) Visit [email or web address], select Sign Up Now, enter this activation code and your date of birth, then select Next.  [email or web address]  Expires: 4/27/2017  3:44 PM      2) Create a username and password to use when you visit [facility] in the future and select a security question in case you lose your password and select Next.    3) Enter your e-mail address and click Sign Up!    Additional Information  If you have questions, please e-mail [email or web address] or call [phone] to talk to our [facility] staff. Remember, [facility] is NOT to be used for urgent needs. For medical emergencies, dial 911.          [facility] complies with applicable Federal civil rights laws and does not discriminate on the basis of race, color, national origin, age, disability, or sex.        Language Assistance Services     ATTENTION: Language assistance services are available, free of charge. Please call [phone].      ATENCIÓN: Si habla español, tiene a clark disposición servicios gratuitos de asistencia lingüística. [place] al [phone].     CHÚ Ý: N?u b?n nói Ti?ng Vi?t, có các d?ch v? h? tr? ngôn ng? mi?n phí dành cho b?n. G?i s? [phone].

## 2017-04-12 VITALS
SYSTOLIC BLOOD PRESSURE: 163 MMHG | DIASTOLIC BLOOD PRESSURE: 103 MMHG | HEART RATE: 91 BPM | WEIGHT: 257 LBS | BODY MASS INDEX: 34.81 KG/M2 | RESPIRATION RATE: 15 BRPM | HEIGHT: 72 IN | OXYGEN SATURATION: 99 % | TEMPERATURE: 98 F

## 2017-04-12 LAB
BNP SERPL-MCNC: <10 PG/ML
POCT GLUCOSE: 226 MG/DL (ref 70–110)
POCT GLUCOSE: 309 MG/DL (ref 70–110)
POCT GLUCOSE: 311 MG/DL (ref 70–110)
TROPONIN I SERPL DL<=0.01 NG/ML-MCNC: 0.01 NG/ML

## 2017-04-12 PROCEDURE — 63600175 PHARM REV CODE 636 W HCPCS: Performed by: EMERGENCY MEDICINE

## 2017-04-12 PROCEDURE — 25500020 PHARM REV CODE 255: Performed by: EMERGENCY MEDICINE

## 2017-04-12 PROCEDURE — 25000003 PHARM REV CODE 250: Performed by: EMERGENCY MEDICINE

## 2017-04-12 PROCEDURE — 84484 ASSAY OF TROPONIN QUANT: CPT

## 2017-04-12 PROCEDURE — 82962 GLUCOSE BLOOD TEST: CPT

## 2017-04-12 RX ADMIN — INSULIN HUMAN 5 UNITS: 100 INJECTION, SOLUTION PARENTERAL at 02:04

## 2017-04-12 RX ADMIN — IOHEXOL 100 ML: 350 INJECTION, SOLUTION INTRAVENOUS at 02:04

## 2017-04-12 RX ADMIN — SODIUM CHLORIDE 1000 ML: 0.9 INJECTION, SOLUTION INTRAVENOUS at 02:04

## 2017-04-12 NOTE — ED PROVIDER NOTES
SCRIBE #1 NOTE: I, Ralph Simmons, am scribing for, and in the presence of, Arben Chávez Jr., MD. I have scribed the HPI, ROS, PEx, and ED discussion until pt handoff.    SCRIBE #2 NOTE: I, Corinne Mack, am scribing for, and in the presence of,  Connor Miramontes MD. I have scribed the remaining portions of the note not scribed by Scribe #1.     History      Chief Complaint   Patient presents with    Chest Pain     x2hrs. relieved with nitro       Review of patient's allergies indicates:  No Known Allergies     HPI   HPI    4/11/2017, 10:02 PM   History obtained from the patient and EMS      History of Present Illness: Vadim Devries III is a 41 y.o. male patient who presents to the Emergency Department for chest pain which onset gradually 2 hours ago. Sx are constant and moderate in severity.  EMS reports pt stated pain improved after tx with NTG x2 and 325 mg ASA given en route. EMS noted blood sugar at 370. Sx are described as pressure. Sx worse with deep inspiration. There are no other mitigating or exacerbating factors noted. Associated sx include dry cough and SOB.  Pt denies any  diaphoresis, palpitations, extremity weakness/numbness, leg pain/swelling, HA, dizziness, N/V/D, fever and all other sx at this time. No further complaints or concerns at this time.       Arrival mode:  EMS      PCP: Petr Mckeon MD       Past Medical History:  Past Medical History:   Diagnosis Date    Diabetes mellitus     Hypertension     Seizures     Stroke          Past Surgical History:  Past surgical history reviewed not relevant      Family History:  Family history reviewed not relevant          Social History Main Topics    Social History Main Topics    Smoking status: Unknown if ever smoked    Smokeless tobacco: Unknown if ever used    Alcohol Use: Unknown drinking history    Drug Use: Unknown if ever used    Sexual Activity: Unknown       Social History:  Social History     Social History Main Topics    Smoking  status: Never Smoker    Smokeless tobacco: Not given    Alcohol use No    Drug use: No    Sexual activity: Not given       ROS   Review of Systems   Constitutional: Negative for chills, diaphoresis and fever.   HENT: Negative for sore throat.    Respiratory: Positive for cough and shortness of breath. Negative for apnea and choking.    Cardiovascular: Positive for chest pain. Negative for palpitations and leg swelling.   Gastrointestinal: Negative for abdominal pain, diarrhea, nausea and vomiting.   Genitourinary: Negative for dysuria.   Musculoskeletal: Negative for back pain.   Skin: Negative for rash.   Neurological: Negative for dizziness, weakness, numbness and headaches.   Hematological: Does not bruise/bleed easily.   All other systems reviewed and are negative.      Physical Exam    Initial Vitals   BP Pulse Resp Temp SpO2   04/11/17 2147 04/11/17 2147 04/11/17 2147 04/11/17 2147 --   151/101 110 18 97.6 °F (36.4 °C)       Physical Exam  Nursing Notes and Vital Signs Reviewed.  Constitutional: Patient is in no acute distress. Awake and alert. Well-developed and well-nourished.  Head: Atraumatic. Normocephalic.  Eyes: PERRL. EOM intact. Conjunctivae are not pale. No scleral icterus.  ENT: Mucous membranes are moist. Oropharynx is clear and symmetric.    Neck: Supple. Full ROM. No lymphadenopathy.  Cardiovascular: tachycardic. Regular rhythm. No murmurs, rubs, or gallops. Distal pulses are 2+ and symmetric.  Pulmonary/Chest: No respiratory distress. Clear to auscultation bilaterally. No wheezing, rales, or rhonchi.  Abdominal: Soft and non-distended.  There is no tenderness.  No rebound, guarding, or rigidity.  Good bowel sounds.   Musculoskeletal: Moves all extremities. No obvious deformities. No edema. No calf tenderness.  Skin: Warm and dry.  Neurological:  Alert, awake, and appropriate.  Normal speech.  No acute focal neurological deficits are appreciated.  Psychiatric: Normal affect. Good eye contact.  Appropriate in content.    ED Course    Procedures  ED Vital Signs:  Vitals:    04/11/17 2147 04/11/17 2200 04/11/17 2302 04/11/17 2332   BP: (!) 151/101  (!) 121/107 (!) 121/103   Pulse: 110 102 (!) 111 108   Resp: 18  17 15   Temp: 97.6 °F (36.4 °C)      TempSrc: Oral      SpO2:   99% 98%   Weight: 116.6 kg (257 lb)      Height: 6' (1.829 m)       04/11/17 2347 04/12/17 0017 04/12/17 0047 04/12/17 0232   BP: (!) 174/96 (!) 159/97 (!) 133/97 (!) 151/83   Pulse: 107 106 99 99   Resp: 18 18 15 18   Temp:       TempSrc:       SpO2: 99% 99% 98% 100%   Weight:       Height:        04/12/17 0402 04/12/17 0532   BP: (!) 169/95 (!) 163/103   Pulse: 94 91   Resp: 17 15   Temp:  98.4 °F (36.9 °C)   TempSrc:     SpO2: 98% 99%   Weight:     Height:         Abnormal Lab Results:  Labs Reviewed   CBC W/ AUTO DIFFERENTIAL - Abnormal; Notable for the following:        Result Value    MCV 79 (*)     All other components within normal limits    Narrative:     PLEASE REVIEW ORDER START TIME BEFORE MARKING SPECIMEN  COLLECTED.   COMPREHENSIVE METABOLIC PANEL - Abnormal; Notable for the following:     CO2 20 (*)     Glucose 439 (*)     Alkaline Phosphatase 144 (*)     ALT 50 (*)     All other components within normal limits    Narrative:     PLEASE REVIEW ORDER START TIME BEFORE MARKING SPECIMEN  COLLECTED.   URINALYSIS - Abnormal; Notable for the following:     Protein, UA Trace (*)     Glucose, UA 3+ (*)     Ketones, UA Trace (*)     All other components within normal limits   POCT GLUCOSE - Abnormal; Notable for the following:     POCT Glucose 311 (*)     All other components within normal limits   POCT GLUCOSE - Abnormal; Notable for the following:     POCT Glucose 309 (*)     All other components within normal limits   POCT GLUCOSE - Abnormal; Notable for the following:     POCT Glucose 226 (*)     All other components within normal limits   PROTIME-INR    Narrative:     PLEASE REVIEW ORDER START TIME BEFORE MARKING  SPECIMEN  COLLECTED.   TROPONIN I    Narrative:     PLEASE REVIEW ORDER START TIME BEFORE MARKING SPECIMEN  COLLECTED.   B-TYPE NATRIURETIC PEPTIDE    Narrative:     PLEASE REVIEW ORDER START TIME BEFORE MARKING SPECIMEN  COLLECTED.   BETA - HYDROXYBUTYRATE, SERUM    Narrative:     PLEASE REVIEW ORDER START TIME BEFORE MARKING SPECIMEN  COLLECTED.   TROPONIN I    Narrative:     PLEASE REVIEW ORDER START TIME BEFORE MARKING SPECIMEN  COLLECTED.   URINALYSIS MICROSCOPIC   TROPONIN I        All Lab Results:  Results for orders placed or performed during the hospital encounter of 04/11/17   CBC auto differential   Result Value Ref Range    WBC 8.37 3.90 - 12.70 K/uL    RBC 5.08 4.60 - 6.20 M/uL    Hemoglobin 14.1 14.0 - 18.0 g/dL    Hematocrit 40.2 40.0 - 54.0 %    MCV 79 (L) 82 - 98 fL    MCH 27.8 27.0 - 31.0 pg    MCHC 35.1 32.0 - 36.0 %    RDW 12.5 11.5 - 14.5 %    Platelets 161 150 - 350 K/uL    MPV 11.0 9.2 - 12.9 fL    Gran # 5.2 1.8 - 7.7 K/uL    Lymph # 2.3 1.0 - 4.8 K/uL    Mono # 0.6 0.3 - 1.0 K/uL    Eos # 0.2 0.0 - 0.5 K/uL    Baso # 0.03 0.00 - 0.20 K/uL    Gran% 62.6 38.0 - 73.0 %    Lymph% 27.6 18.0 - 48.0 %    Mono% 7.6 4.0 - 15.0 %    Eosinophil% 1.8 0.0 - 8.0 %    Basophil% 0.4 0.0 - 1.9 %    Differential Method Automated    Comprehensive metabolic panel   Result Value Ref Range    Sodium 136 136 - 145 mmol/L    Potassium 3.5 3.5 - 5.1 mmol/L    Chloride 105 95 - 110 mmol/L    CO2 20 (L) 23 - 29 mmol/L    Glucose 439 (H) 70 - 110 mg/dL    BUN, Bld 9 6 - 20 mg/dL    Creatinine 1.2 0.5 - 1.4 mg/dL    Calcium 9.3 8.7 - 10.5 mg/dL    Total Protein 7.0 6.0 - 8.4 g/dL    Albumin 3.6 3.5 - 5.2 g/dL    Total Bilirubin 0.4 0.1 - 1.0 mg/dL    Alkaline Phosphatase 144 (H) 55 - 135 U/L    AST 25 10 - 40 U/L    ALT 50 (H) 10 - 44 U/L    Anion Gap 11 8 - 16 mmol/L    eGFR if African American >60 >60 mL/min/1.73 m^2    eGFR if non African American >60 >60 mL/min/1.73 m^2   Protime-INR   Result Value Ref Range     Prothrombin Time 10.3 9.0 - 12.5 sec    INR 1.0 0.8 - 1.2   Troponin I   Result Value Ref Range    Troponin I <0.006 0.000 - 0.026 ng/mL   B-Type natriuretic peptide (BNP)   Result Value Ref Range    BNP <10 0 - 99 pg/mL   Beta - Hydroxybutyrate, Serum   Result Value Ref Range    Beta-Hydroxybutyrate 0.1 0.0 - 0.5 mmol/L   Troponin I   Result Value Ref Range    Troponin I <0.006 0.000 - 0.026 ng/mL   Urinalysis   Result Value Ref Range    Specimen UA Urine, Clean Catch     Color, UA Yellow Yellow, Straw, Kinza    Appearance, UA Clear Clear    pH, UA 6.0 5.0 - 8.0    Specific Gravity, UA 1.010 1.005 - 1.030    Protein, UA Trace (A) Negative    Glucose, UA 3+ (A) Negative    Ketones, UA Trace (A) Negative    Bilirubin (UA) Negative Negative    Occult Blood UA Negative Negative    Nitrite, UA Negative Negative    Urobilinogen, UA 1.0 <2.0 EU/dL    Leukocytes, UA Negative Negative   Urinalysis Microscopic   Result Value Ref Range    Bacteria, UA None None-Occ /hpf    Yeast, UA None None    Microscopic Comment SEE COMMENT    Troponin I   Result Value Ref Range    Troponin I 0.007 0.000 - 0.026 ng/mL   POCT glucose   Result Value Ref Range    POCT Glucose 311 (H) 70 - 110 mg/dL   POCT glucose   Result Value Ref Range    POCT Glucose 309 (H) 70 - 110 mg/dL   POCT glucose   Result Value Ref Range    POCT Glucose 226 (H) 70 - 110 mg/dL         Imaging Results:  Imaging Results         CTA Chest Non-Coronary (PE Study) (In process) 1. There is no acute cardiopulmonary process. There is no pulmonary embolism.  2. There is a 12 mm hyperdense nodule in the anterior superior right hepatic lobe on axial image 144. There is a 13 mm hyperdense superior lateral left hepatic lobe nodule on axial image 149. For patients with low to average risk of malignancy, no further follow-up is necessary. For patients with high risk of malignancy, recommend follow-up CT or MR in 6 months or multiphasic MR to evaluate further.        X-Ray Chest PA  And Lateral (Final result) Result time:  04/11/17 22:14:30    Final result by Dick Brewer MD (04/11/17 22:14:30)    Impression:         Negative chest radiograph.            Electronically signed by: DICK BREWER MD  Date:     04/11/17  Time:    22:14     Narrative:    Exam: XR CHEST PA AND LATERAL    Clinical History:   Acute onset  Chest Pain    Findings:   The lungs are clear. The cardiac silhouette is within normal limits.             The EKG was ordered, reviewed, and independently interpreted by the ED provider.  Interpretation time: 22:02  Rate: 102 BPM  Rhythm: sinus tachycardia  Interpretation: No STEMI.         The Emergency Provider reviewed the vital signs and test results, which are outlined above.    ED Discussion          1:50 AM: Re-evaluated pt. Pt is resting comfortably and is in no acute distress.  Pt is still tachycardic. He states he is still SOB.  D/w pt all pertinent results. D/w pt any concerns expressed at this time. Answered all questions. Pt expresses understanding at this time.      2:19 AM: Pt is A&O x3, appropriate and competent at this time. Pt voices desire to leave hospital. D/w pt in length need for further evaluation and treatment due to HPI and PEx.  All risks, including worsening sx, permanent bodily harm and death, were discussed in length. After information was discussed, pt decided to stay here for further evaluation. All questions and concerns addressed at this time.       2:22 AM: Dr. Chávez transfers care of pt to Dr. Miramontes, pending CTA chest results.      5:07 AM: Reassessed pt at this time. Discussed with pt all pertinent ED information and results. Discussed pt dx and plan of tx. Gave pt all f/u and return to the ED instructions. All questions and concerns were addressed at this time. Pt expresses understanding of information and instructions, and is comfortable with plan to discharge. Pt is stable for discharge.      I discussed with patient and/or  family/caretaker that evaluation in the ED does not suggest any emergent or life threatening medical conditions requiring immediate intervention beyond what was provided in the ED, and I believe patient is safe for discharge.  Regardless, an unremarkable evaluation in the ED does not preclude the development or presence of a serious of life threatening condition. As such, patient was instructed to return immediately for any worsening or change in current symptoms.        ED Medication(s):  Medications   hydrALAZINE injection 10 mg (10 mg Intravenous Given 4/11/17 2235)   sodium chloride 0.9% bolus 1,000 mL (0 mLs Intravenous Stopped 4/12/17 0143)   sodium chloride 0.9% bolus 1,000 mL (0 mLs Intravenous Stopped 4/12/17 0125)   sodium chloride 0.9% bolus 1,000 mL (0 mLs Intravenous Stopped 4/12/17 0355)   insulin regular injection 5 Units (5 Units Intravenous Given 4/12/17 0254)   omnipaque 350 iohexol 100 mL (100 mLs Intravenous Given 4/12/17 0248)       Discharge Medication List as of 4/12/2017  5:02 AM          Follow-up Information     Follow up with Petr Mckeon MD In 2 days.    Specialty:  Family Medicine    Contact information:    [id number] [facility] [id number] [phone]          Follow up with [facility] - .    Specialty:  Emergency Medicine    Why:  As needed, If symptoms worsen    Contact information:    [id number] [facility] [address] [phone]             Medical Decision Making    Medical Decision Making:   Clinical Tests:   Lab Tests: Reviewed and Ordered  Radiological Study: Reviewed and Ordered  Medical Tests: Reviewed and Ordered           Scribe Attestation:   Scribe #1: I performed the above scribed service and the documentation accurately describes the services I performed. I attest to the accuracy of the note.    Attending:   Physician Attestation Statement for Scribe #1: I, Arben Chávez Jr., MD, personally performed  the services described in this documentation, as scribed by Ralph Simmons in my presence, and it is both accurate and complete.       Scribe Attestation:   Scribe #2: I performed the above scribed service and the documentation accurately describes the services I performed. I attest to the accuracy of the note.    Attending Attestation:           Physician Attestation for Scribe:    Physician Attestation Statement for Scribe #2: I, Connor Miramontes MD, reviewed documentation, as scribed by Corinne Mack in my presence, and it is both accurate and complete. I also acknowledge and confirm the content of the note done by Scribe #1.          Clinical Impression       ICD-10-CM ICD-9-CM   1. Chest pain R07.9 786.50   2. Pulmonary nodule R91.1 793.11       Disposition:   Disposition: Discharged  Condition: Stable         Connor Miramontes MD  04/19/17 1930

## 2017-04-12 NOTE — ED NOTES
Pt is currently resting in bed. NAD noted. AAO x 3. RR e/u, airway open and patent. Bed in locked and low position, side rails up x 2, call light within reach, will continue to monitor.

## 2017-04-12 NOTE — ED NOTES
Pt is currently resting in bed. NAD noted. AAO x 3. RR e/u, airway open and patent. Will continue with discharge.

## 2017-04-12 NOTE — DISCHARGE INSTRUCTIONS

## 2017-10-02 ENCOUNTER — HOSPITAL ENCOUNTER (EMERGENCY)
Facility: HOSPITAL | Age: 42
Discharge: HOME | End: 2017-10-02
Attending: EMERGENCY MEDICINE
Payer: COMMERCIAL

## 2017-10-02 VITALS
WEIGHT: 245 LBS | HEIGHT: 72 IN | OXYGEN SATURATION: 99 % | HEART RATE: 93 BPM | DIASTOLIC BLOOD PRESSURE: 91 MMHG | SYSTOLIC BLOOD PRESSURE: 147 MMHG | TEMPERATURE: 98 F | RESPIRATION RATE: 15 BRPM | BODY MASS INDEX: 33.18 KG/M2

## 2017-10-02 DIAGNOSIS — Z91.199 NONCOMPLIANCE WITH DIET AND MEDICATION REGIMEN: ICD-10-CM

## 2017-10-02 DIAGNOSIS — I10 HYPERTENSION: ICD-10-CM

## 2017-10-02 DIAGNOSIS — G40.909 SEIZURE DISORDER: Primary | ICD-10-CM

## 2017-10-02 DIAGNOSIS — I10 CHRONIC HYPERTENSION: ICD-10-CM

## 2017-10-02 DIAGNOSIS — Z91.148 NONCOMPLIANCE WITH DIET AND MEDICATION REGIMEN: ICD-10-CM

## 2017-10-02 LAB
ALBUMIN SERPL BCP-MCNC: 3.5 G/DL
ALP SERPL-CCNC: 126 U/L
ALT SERPL W/O P-5'-P-CCNC: 45 U/L
AMPHET+METHAMPHET UR QL: NEGATIVE
ANION GAP SERPL CALC-SCNC: 10 MMOL/L
AST SERPL-CCNC: 26 U/L
BARBITURATES UR QL SCN>200 NG/ML: NEGATIVE
BASOPHILS # BLD AUTO: 0.02 K/UL
BASOPHILS NFR BLD: 0.2 %
BENZODIAZ UR QL SCN>200 NG/ML: NEGATIVE
BILIRUB SERPL-MCNC: 0.6 MG/DL
BUN SERPL-MCNC: 10 MG/DL
BZE UR QL SCN: NEGATIVE
CALCIUM SERPL-MCNC: 9 MG/DL
CANNABINOIDS UR QL SCN: NEGATIVE
CHLORIDE SERPL-SCNC: 103 MMOL/L
CO2 SERPL-SCNC: 25 MMOL/L
CREAT SERPL-MCNC: 1.1 MG/DL
CREAT UR-MCNC: 257.6 MG/DL
DIFFERENTIAL METHOD: ABNORMAL
EOSINOPHIL # BLD AUTO: 0.1 K/UL
EOSINOPHIL NFR BLD: 0.6 %
ERYTHROCYTE [DISTWIDTH] IN BLOOD BY AUTOMATED COUNT: 12.8 %
EST. GFR  (AFRICAN AMERICAN): >60 ML/MIN/1.73 M^2
EST. GFR  (NON AFRICAN AMERICAN): >60 ML/MIN/1.73 M^2
ETHANOL SERPL-MCNC: <10 MG/DL
GLUCOSE SERPL-MCNC: 291 MG/DL
HCT VFR BLD AUTO: 43.2 %
HGB BLD-MCNC: 14.7 G/DL
LYMPHOCYTES # BLD AUTO: 1.9 K/UL
LYMPHOCYTES NFR BLD: 23.6 %
MAGNESIUM SERPL-MCNC: 1.8 MG/DL
MCH RBC QN AUTO: 26.9 PG
MCHC RBC AUTO-ENTMCNC: 34 G/DL
MCV RBC AUTO: 79 FL
METHADONE UR QL SCN>300 NG/ML: NEGATIVE
MONOCYTES # BLD AUTO: 0.6 K/UL
MONOCYTES NFR BLD: 6.9 %
NEUTROPHILS # BLD AUTO: 5.6 K/UL
NEUTROPHILS NFR BLD: 68.7 %
OPIATES UR QL SCN: NEGATIVE
PCP UR QL SCN>25 NG/ML: NEGATIVE
PHENYTOIN SERPL-MCNC: <0.1 UG/ML
PLATELET # BLD AUTO: 167 K/UL
PMV BLD AUTO: 11.3 FL
POCT GLUCOSE: 275 MG/DL (ref 70–110)
POTASSIUM SERPL-SCNC: 3.6 MMOL/L
PROT SERPL-MCNC: 7.1 G/DL
RBC # BLD AUTO: 5.46 M/UL
SODIUM SERPL-SCNC: 138 MMOL/L
TOXICOLOGY INFORMATION: NORMAL
WBC # BLD AUTO: 8.21 K/UL

## 2017-10-02 PROCEDURE — 80053 COMPREHEN METABOLIC PANEL: CPT

## 2017-10-02 PROCEDURE — 80307 DRUG TEST PRSMV CHEM ANLYZR: CPT

## 2017-10-02 PROCEDURE — 82962 GLUCOSE BLOOD TEST: CPT

## 2017-10-02 PROCEDURE — 99285 EMERGENCY DEPT VISIT HI MDM: CPT | Mod: 25

## 2017-10-02 PROCEDURE — 93005 ELECTROCARDIOGRAM TRACING: CPT

## 2017-10-02 PROCEDURE — 83735 ASSAY OF MAGNESIUM: CPT

## 2017-10-02 PROCEDURE — 93010 ELECTROCARDIOGRAM REPORT: CPT | Mod: ,,, | Performed by: INTERNAL MEDICINE

## 2017-10-02 PROCEDURE — 85025 COMPLETE CBC W/AUTO DIFF WBC: CPT

## 2017-10-02 PROCEDURE — 80185 ASSAY OF PHENYTOIN TOTAL: CPT

## 2017-10-02 PROCEDURE — 80320 DRUG SCREEN QUANTALCOHOLS: CPT

## 2017-10-02 PROCEDURE — 63600175 PHARM REV CODE 636 W HCPCS: Performed by: EMERGENCY MEDICINE

## 2017-10-02 PROCEDURE — 25000003 PHARM REV CODE 250: Performed by: EMERGENCY MEDICINE

## 2017-10-02 PROCEDURE — 96365 THER/PROPH/DIAG IV INF INIT: CPT

## 2017-10-02 RX ORDER — AMLODIPINE BESYLATE 10 MG/1
10 TABLET ORAL DAILY
Qty: 30 TABLET | Refills: 0 | Status: SHIPPED | OUTPATIENT
Start: 2017-10-02 | End: 2018-04-23

## 2017-10-02 RX ORDER — LABETALOL 100 MG/1
100 TABLET, FILM COATED ORAL 2 TIMES DAILY
Qty: 30 TABLET | Refills: 0 | Status: SHIPPED | OUTPATIENT
Start: 2017-10-02 | End: 2018-04-23

## 2017-10-02 RX ORDER — CLONIDINE HYDROCHLORIDE 0.1 MG/1
0.1 TABLET ORAL 2 TIMES DAILY
Qty: 60 TABLET | Refills: 0 | Status: SHIPPED | OUTPATIENT
Start: 2017-10-02 | End: 2018-04-23

## 2017-10-02 RX ORDER — CLONIDINE HYDROCHLORIDE 0.2 MG/1
0.2 TABLET ORAL
Status: COMPLETED | OUTPATIENT
Start: 2017-10-02 | End: 2017-10-02

## 2017-10-02 RX ORDER — LABETALOL HYDROCHLORIDE 5 MG/ML
20 INJECTION, SOLUTION INTRAVENOUS
Status: DISCONTINUED | OUTPATIENT
Start: 2017-10-02 | End: 2017-10-02

## 2017-10-02 RX ORDER — METFORMIN HYDROCHLORIDE 500 MG/1
500 TABLET ORAL 2 TIMES DAILY WITH MEALS
Qty: 60 TABLET | Refills: 0 | Status: SHIPPED | OUTPATIENT
Start: 2017-10-02 | End: 2018-04-23

## 2017-10-02 RX ORDER — LISINOPRIL 10 MG/1
10 TABLET ORAL DAILY
Qty: 30 TABLET | Refills: 0 | Status: SHIPPED | OUTPATIENT
Start: 2017-10-02 | End: 2018-04-23

## 2017-10-02 RX ORDER — PHENYTOIN SODIUM 100 MG/1
200 CAPSULE, EXTENDED RELEASE ORAL 3 TIMES DAILY
Qty: 90 CAPSULE | Refills: 0 | Status: SHIPPED | OUTPATIENT
Start: 2017-10-02 | End: 2018-04-23

## 2017-10-02 RX ADMIN — DEXTROSE 1500 MG PE: 50 INJECTION, SOLUTION INTRAVENOUS at 01:10

## 2017-10-02 RX ADMIN — CLONIDINE HYDROCHLORIDE 0.2 MG: 0.2 TABLET ORAL at 01:10

## 2017-10-02 NOTE — ED PROVIDER NOTES
SCRIBE #1 NOTE: I, Trevin Talbot Suzy, am scribing for, and in the presence of, Maritza Driver MD. I have scribed the entire note.      History      Chief Complaint   Patient presents with    Seizures     pt had a seizure while at work today, pt uses a w/c due to paralysis from CVA, pt states it was a focal seizure; pt given 10mg labetolol by EMS due to HTN, pt states he has been out of his HTN meds       Review of patient's allergies indicates:  No Known Allergies     HPI   HPI    10/2/2017, 11:54 AM   History obtained from the patient      History of Present Illness: Vadim Devries III is a 42 y.o. male patient with a PMHx of SZ, CVA, who presents to the Emergency Department for SZ which onset suddenly PTA. Sxs are episodic and moderate in severity. 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 Pt denies fall and said that his students helped him to the floor. There are no mitigating or exacerbating factors noted.  Pt denies any fever, N/V/D, head trauma, CP, SOB, HA, numbness, incontinence, and all other sxs at this time. Prior Tx includes 10 mg labetolol en route. Pt reports he has been non compliant with HTN medication and SZ medication because he cannot afford his Rx. Pt states that he took the last of his clonidine yesterday. No further complaints or concerns at this time.       Arrival mode: EMS      PCP: Petr Mckeon MD       Past Medical History:  Past Medical History:   Diagnosis Date    Diabetes mellitus     Hypertension     Seizures     Stroke        Past Surgical History:  History reviewed. No pertinent surgical history.      Family History:  History reviewed. No pertinent family history.    Social History:  Social History     Social History Main Topics    Smoking status: Never Smoker    Smokeless tobacco: Never Used    Alcohol use No    Drug use: No    Sexual activity: unknown       ROS   Review of Systems   Constitutional:  Negative for fever.   HENT: Negative for sore throat.    Respiratory: Negative for shortness of breath.    Cardiovascular: Negative for chest pain.   Gastrointestinal: Negative for nausea.   Genitourinary: Negative for dysuria.   Musculoskeletal: Negative for back pain.   Skin: Negative for rash.   Neurological: Positive for seizures. Negative for dizziness, facial asymmetry, weakness and numbness.   Hematological: Does not bruise/bleed easily.     Physical Exam      Initial Vitals [10/02/17 1142]   BP Pulse Resp Temp SpO2   (!) 203/126 92 18 98.3 °F (36.8 °C) 99 %      MAP       151.67          Physical Exam  Nursing Notes and Vital Signs Reviewed.  Constitutional: Patient is in no acute distress. Well-developed and well-nourished.  Head: Atraumatic. Normocephalic.  Eyes: PERRL. EOM intact. Conjunctivae are not pale. No scleral icterus.  ENT: Mucous membranes are moist. Oropharynx is clear and symmetric.    Neck: Supple. Full ROM. No lymphadenopathy.  Cardiovascular: Regular rate. Regular rhythm. No murmurs, rubs, or gallops. Distal pulses are 2+ and symmetric.  Pulmonary/Chest: No respiratory distress. Clear to auscultation bilaterally. No wheezing, rales, or rhonchi.  Abdominal: Soft and non-distended.  There is no tenderness.  No rebound, guarding, or rigidity. Good bowel sounds.  Genitourinary: No CVA tenderness  Musculoskeletal: No obvious deformities. No edema. No calf tenderness.  Skin: Warm and dry.  Neurological:  Alert, awake, and appropriate.  Normal speech.  No acute focal neurological deficits are appreciated.  Psychiatric: Normal affect. Good eye contact. Appropriate in content.    ED Course    Procedures  ED Vital Signs:  Vitals:    10/02/17 1142 10/02/17 1155 10/02/17 1220 10/02/17 1250   BP: (!) 203/126 (!) 209/107 (!) 226/159 (!) 217/121   Pulse: 92 86 82 82   Resp: 18 16 14 11   Temp: 98.3 °F (36.8 °C)      TempSrc: Oral      SpO2: 99% 98% 100% 100%   Weight: 111.1 kg (245 lb)      Height: 6'  (1.829 m)       10/02/17 1305 10/02/17 1355 10/02/17 1415 10/02/17 1431   BP: (!) 233/127 (!) 211/124 (!) 182/110 (!) 186/107   Pulse: 94 84 88 94   Resp: 12 17 17 17   Temp:       TempSrc:       SpO2: 100% 99% 98% 99%   Weight:       Height:           Abnormal Lab Results:  Labs Reviewed   CBC W/ AUTO DIFFERENTIAL - Abnormal; Notable for the following:        Result Value    MCV 79 (*)     MCH 26.9 (*)     All other components within normal limits   COMPREHENSIVE METABOLIC PANEL - Abnormal; Notable for the following:     Glucose 291 (*)     ALT 45 (*)     All other components within normal limits   PHENYTOIN LEVEL, TOTAL - Abnormal; Notable for the following:     Phenytoin Lvl <0.1 (*)     All other components within normal limits   POCT GLUCOSE - Abnormal; Notable for the following:     POCT Glucose 275 (*)     All other components within normal limits   MAGNESIUM   DRUG SCREEN PANEL, URINE EMERGENCY   ALCOHOL,MEDICAL (ETHANOL)   POCT GLUCOSE        All Lab Results:  Results for orders placed or performed during the hospital encounter of 10/02/17   CBC W/ AUTO DIFFERENTIAL   Result Value Ref Range    WBC 8.21 3.90 - 12.70 K/uL    RBC 5.46 4.60 - 6.20 M/uL    Hemoglobin 14.7 14.0 - 18.0 g/dL    Hematocrit 43.2 40.0 - 54.0 %    MCV 79 (L) 82 - 98 fL    MCH 26.9 (L) 27.0 - 31.0 pg    MCHC 34.0 32.0 - 36.0 g/dL    RDW 12.8 11.5 - 14.5 %    Platelets 167 150 - 350 K/uL    MPV 11.3 9.2 - 12.9 fL    Gran # 5.6 1.8 - 7.7 K/uL    Lymph # 1.9 1.0 - 4.8 K/uL    Mono # 0.6 0.3 - 1.0 K/uL    Eos # 0.1 0.0 - 0.5 K/uL    Baso # 0.02 0.00 - 0.20 K/uL    Gran% 68.7 38.0 - 73.0 %    Lymph% 23.6 18.0 - 48.0 %    Mono% 6.9 4.0 - 15.0 %    Eosinophil% 0.6 0.0 - 8.0 %    Basophil% 0.2 0.0 - 1.9 %    Differential Method Automated    Comprehensive metabolic panel   Result Value Ref Range    Sodium 138 136 - 145 mmol/L    Potassium 3.6 3.5 - 5.1 mmol/L    Chloride 103 95 - 110 mmol/L    CO2 25 23 - 29 mmol/L    Glucose 291 (H) 70 - 110  mg/dL    BUN, Bld 10 6 - 20 mg/dL    Creatinine 1.1 0.5 - 1.4 mg/dL    Calcium 9.0 8.7 - 10.5 mg/dL    Total Protein 7.1 6.0 - 8.4 g/dL    Albumin 3.5 3.5 - 5.2 g/dL    Total Bilirubin 0.6 0.1 - 1.0 mg/dL    Alkaline Phosphatase 126 55 - 135 U/L    AST 26 10 - 40 U/L    ALT 45 (H) 10 - 44 U/L    Anion Gap 10 8 - 16 mmol/L    eGFR if African American >60 >60 mL/min/1.73 m^2    eGFR if non African American >60 >60 mL/min/1.73 m^2   Magnesium   Result Value Ref Range    Magnesium 1.8 1.6 - 2.6 mg/dL   Drug screen panel, emergency   Result Value Ref Range    Benzodiazepines Negative     Methadone metabolites Negative     Cocaine (Metab.) Negative     Opiate Scrn, Ur Negative     Barbiturate Screen, Ur Negative     Amphetamine Screen, Ur Negative     THC Negative     Phencyclidine Negative     Creatinine, Random Ur 257.6 23.0 - 375.0 mg/dL    Toxicology Information SEE COMMENT    Ethanol level   Result Value Ref Range    Alcohol, Medical, Serum <10 <10 mg/dL   Phenytoin level, total   Result Value Ref Range    Phenytoin Lvl <0.1 (L) 10.0 - 20.0 ug/mL   POCT glucose   Result Value Ref Range    POCT Glucose 275 (H) 70 - 110 mg/dL            The EKG was ordered, reviewed, and independently interpreted by the ED provider.  Interpretation time: 12:35  Rate: 72 BPM  Rhythm: normal sinus rhythm  Interpretation: No acute ST changes. No STEMI.      The Emergency Provider reviewed the vital signs and test results, which are outlined above.    ED Discussion     2:35 PM: Reassessed pt. Pt states their condition has improved at this time. Pt requesting refills of all his at home medications. Discussed with pt all pertinent ED information and results. Discussed plan of treatment with pt. Gave pt all f/u and return to the ED instructions. All questions and concerns were addressed at this time. Pt understands and agrees to plan as discussed. Pt is stable for discharge.       Patient presents with seizure or seizure-like symptoms. I have  no suspicion of an intracranial, traumatic, infectious, metabolic, toxic, cardiovascular (specifically arrhythmia), or other emergent medical condition requiring further intervention. Seizure precautions were discussed with the patient and/or caretaker, specifically not to swim unattended, not to operate motor vehicles or other machinery, and to avoid heights or other areas where falls may occur until cleared by primary care physician. Patient is safe for discharge.    ED Medication(s):  Medications   fosphenytoin (CEREBYX) 1,500 mg PE in dextrose 5 % 100 mL IVPB (0 mg PE Intravenous Stopped 10/2/17 1359)   cloNIDine tablet 0.2 mg (0.2 mg Oral Given 10/2/17 1302)       Current Discharge Medication List          Follow-up Information     Petr Mckeon MD. Schedule an appointment as soon as possible for a visit in 2 days.    Specialty:  Family Medicine  Why:  Return to the Emergency Room, If symptoms worsen  Contact information:  [address] [facility] [place] [address]  [phone]                     Medical Decision Making    Medical Decision Making:   Clinical Tests:   Radiological Study: Reviewed and Ordered  Medical Tests: Reviewed and Ordered           Scribe Attestation:   Scribe #1: I performed the above scribed service and the documentation accurately describes the services I performed. I attest to the accuracy of the note.    Attending:   Physician Attestation Statement for Scribe #1: I, Maritza Driver MD, personally performed the services described in this documentation, as scribed by Trevin Almonte, in my presence, and it is both accurate and complete.          Clinical Impression       ICD-10-CM ICD-9-CM   1. Seizure disorder G40.909 345.90   2. Hypertension I10 401.9   3. Chronic hypertension I10 401.9   4. Noncompliance with diet and medication regimen Z91.11 V15.81    Z91.14        Disposition:   Disposition: Discharged  Condition: Stable         Maritza Driver  MD  10/02/17 1526

## 2018-04-23 ENCOUNTER — HOSPITAL ENCOUNTER (EMERGENCY)
Facility: HOSPITAL | Age: 43
Discharge: HOME | End: 2018-04-23
Attending: SPECIALIST
Payer: COMMERCIAL

## 2018-04-23 VITALS
RESPIRATION RATE: 19 BRPM | BODY MASS INDEX: 34.58 KG/M2 | WEIGHT: 255 LBS | HEART RATE: 82 BPM | SYSTOLIC BLOOD PRESSURE: 100 MMHG | DIASTOLIC BLOOD PRESSURE: 55 MMHG | TEMPERATURE: 98 F | OXYGEN SATURATION: 99 %

## 2018-04-23 DIAGNOSIS — S09.90XA INJURY OF HEAD, INITIAL ENCOUNTER: ICD-10-CM

## 2018-04-23 DIAGNOSIS — I10 ESSENTIAL HYPERTENSION: ICD-10-CM

## 2018-04-23 DIAGNOSIS — R56.9 SEIZURE: Primary | ICD-10-CM

## 2018-04-23 LAB
ALBUMIN SERPL BCP-MCNC: 4.1 G/DL
ALP SERPL-CCNC: 147 U/L
ALT SERPL W/O P-5'-P-CCNC: 41 U/L
ANION GAP SERPL CALC-SCNC: 11 MMOL/L
AST SERPL-CCNC: 23 U/L
BASOPHILS # BLD AUTO: 0.04 K/UL
BASOPHILS NFR BLD: 0.4 %
BILIRUB SERPL-MCNC: 0.6 MG/DL
BUN SERPL-MCNC: 9 MG/DL
CALCIUM SERPL-MCNC: 9.8 MG/DL
CHLORIDE SERPL-SCNC: 104 MMOL/L
CK SERPL-CCNC: 154 U/L
CO2 SERPL-SCNC: 24 MMOL/L
CREAT SERPL-MCNC: 1 MG/DL
DIFFERENTIAL METHOD: ABNORMAL
EOSINOPHIL # BLD AUTO: 0.1 K/UL
EOSINOPHIL NFR BLD: 0.8 %
ERYTHROCYTE [DISTWIDTH] IN BLOOD BY AUTOMATED COUNT: 12.6 %
EST. GFR  (AFRICAN AMERICAN): >60 ML/MIN/1.73 M^2
EST. GFR  (NON AFRICAN AMERICAN): >60 ML/MIN/1.73 M^2
GLUCOSE SERPL-MCNC: 296 MG/DL
HCT VFR BLD AUTO: 44 %
HGB BLD-MCNC: 15.1 G/DL
LYMPHOCYTES # BLD AUTO: 1.6 K/UL
LYMPHOCYTES NFR BLD: 15.5 %
MAGNESIUM SERPL-MCNC: 2 MG/DL
MCH RBC QN AUTO: 27.4 PG
MCHC RBC AUTO-ENTMCNC: 34.3 G/DL
MCV RBC AUTO: 80 FL
MONOCYTES # BLD AUTO: 0.6 K/UL
MONOCYTES NFR BLD: 5.7 %
NEUTROPHILS # BLD AUTO: 8.1 K/UL
NEUTROPHILS NFR BLD: 77.6 %
PHENYTOIN SERPL-MCNC: <0.1 UG/ML
PLATELET # BLD AUTO: 170 K/UL
PMV BLD AUTO: 11.3 FL
POTASSIUM SERPL-SCNC: 4 MMOL/L
PROT SERPL-MCNC: 7.9 G/DL
RBC # BLD AUTO: 5.51 M/UL
SODIUM SERPL-SCNC: 139 MMOL/L
TROPONIN I SERPL DL<=0.01 NG/ML-MCNC: 0.01 NG/ML
WBC # BLD AUTO: 10.47 K/UL

## 2018-04-23 PROCEDURE — 82550 ASSAY OF CK (CPK): CPT

## 2018-04-23 PROCEDURE — 96365 THER/PROPH/DIAG IV INF INIT: CPT

## 2018-04-23 PROCEDURE — 63600175 PHARM REV CODE 636 W HCPCS: Performed by: SPECIALIST

## 2018-04-23 PROCEDURE — 93010 ELECTROCARDIOGRAM REPORT: CPT | Mod: ,,, | Performed by: INTERNAL MEDICINE

## 2018-04-23 PROCEDURE — 80053 COMPREHEN METABOLIC PANEL: CPT

## 2018-04-23 PROCEDURE — 85025 COMPLETE CBC W/AUTO DIFF WBC: CPT

## 2018-04-23 PROCEDURE — 96375 TX/PRO/DX INJ NEW DRUG ADDON: CPT

## 2018-04-23 PROCEDURE — 80185 ASSAY OF PHENYTOIN TOTAL: CPT

## 2018-04-23 PROCEDURE — 99285 EMERGENCY DEPT VISIT HI MDM: CPT | Mod: 25

## 2018-04-23 PROCEDURE — 83735 ASSAY OF MAGNESIUM: CPT

## 2018-04-23 PROCEDURE — 25000003 PHARM REV CODE 250: Performed by: SPECIALIST

## 2018-04-23 PROCEDURE — 84484 ASSAY OF TROPONIN QUANT: CPT

## 2018-04-23 RX ORDER — LABETALOL HYDROCHLORIDE 5 MG/ML
20 INJECTION, SOLUTION INTRAVENOUS
Status: COMPLETED | OUTPATIENT
Start: 2018-04-23 | End: 2018-04-23

## 2018-04-23 RX ORDER — PHENYTOIN SODIUM 100 MG/1
200 CAPSULE, EXTENDED RELEASE ORAL 3 TIMES DAILY
Qty: 180 CAPSULE | Refills: 0 | Status: SHIPPED | OUTPATIENT
Start: 2018-04-23 | End: 2019-09-27 | Stop reason: SDUPTHER

## 2018-04-23 RX ORDER — CLONIDINE HYDROCHLORIDE 0.2 MG/1
0.2 TABLET ORAL
Status: COMPLETED | OUTPATIENT
Start: 2018-04-23 | End: 2018-04-23

## 2018-04-23 RX ORDER — DIPHENHYDRAMINE HYDROCHLORIDE 50 MG/ML
12.5 INJECTION INTRAMUSCULAR; INTRAVENOUS
Status: COMPLETED | OUTPATIENT
Start: 2018-04-23 | End: 2018-04-23

## 2018-04-23 RX ORDER — LISINOPRIL 10 MG/1
10 TABLET ORAL DAILY
Qty: 30 TABLET | Refills: 0 | Status: SHIPPED | OUTPATIENT
Start: 2018-04-23 | End: 2019-09-27 | Stop reason: SDUPTHER

## 2018-04-23 RX ORDER — SIMVASTATIN 10 MG/1
10 TABLET, FILM COATED ORAL NIGHTLY
Qty: 30 TABLET | Refills: 0 | Status: SHIPPED | OUTPATIENT
Start: 2018-04-23 | End: 2019-09-27 | Stop reason: SDUPTHER

## 2018-04-23 RX ORDER — CLONIDINE HYDROCHLORIDE 0.1 MG/1
0.1 TABLET ORAL 2 TIMES DAILY
Qty: 60 TABLET | Refills: 0 | Status: SHIPPED | OUTPATIENT
Start: 2018-04-23 | End: 2019-09-27 | Stop reason: SDUPTHER

## 2018-04-23 RX ORDER — AMLODIPINE BESYLATE 10 MG/1
10 TABLET ORAL DAILY
Qty: 30 TABLET | Refills: 0 | Status: SHIPPED | OUTPATIENT
Start: 2018-04-23 | End: 2019-09-27 | Stop reason: SDUPTHER

## 2018-04-23 RX ORDER — ACETAMINOPHEN 10 MG/ML
1000 INJECTION, SOLUTION INTRAVENOUS
Status: COMPLETED | OUTPATIENT
Start: 2018-04-23 | End: 2018-04-23

## 2018-04-23 RX ORDER — METFORMIN HYDROCHLORIDE 500 MG/1
500 TABLET ORAL 2 TIMES DAILY WITH MEALS
Qty: 60 TABLET | Refills: 0 | Status: SHIPPED | OUTPATIENT
Start: 2018-04-23 | End: 2019-09-27 | Stop reason: SDUPTHER

## 2018-04-23 RX ORDER — LABETALOL 100 MG/1
100 TABLET, FILM COATED ORAL 2 TIMES DAILY
Qty: 30 TABLET | Refills: 0 | Status: SHIPPED | OUTPATIENT
Start: 2018-04-23 | End: 2019-09-27 | Stop reason: SDUPTHER

## 2018-04-23 RX ADMIN — LABETALOL HYDROCHLORIDE 20 MG: 5 INJECTION, SOLUTION INTRAVENOUS at 03:04

## 2018-04-23 RX ADMIN — CLONIDINE HYDROCHLORIDE 0.2 MG: 0.2 TABLET ORAL at 05:04

## 2018-04-23 RX ADMIN — ACETAMINOPHEN 1000 MG: 10 INJECTION, SOLUTION INTRAVENOUS at 03:04

## 2018-04-23 RX ADMIN — DIPHENHYDRAMINE HYDROCHLORIDE 12.5 MG: 50 INJECTION, SOLUTION INTRAMUSCULAR; INTRAVENOUS at 04:04

## 2018-04-23 RX ADMIN — DEXTROSE MONOHYDRATE 1155 MG PE: 5 INJECTION, SOLUTION INTRAVENOUS at 04:04

## 2018-04-23 NOTE — ED PROVIDER NOTES
SCRIBE #1 NOTE: I, Julienne Miramontes, am scribing for, and in the presence of, Stacie Claros MD. I have scribed the HPI, ROS, and PEx.     SCRIBE #2 NOTE: I, Geeta Lacey, am scribing for, and in the presence of,  Stacie Claros MD. I have scribed the remaining portions of the note not scribed by Scribe #1.     History      Chief Complaint   Patient presents with    Seizures     pt fell out of wheelchair during seizure       Review of patient's allergies indicates:  No Known Allergies     HPI   HPI    4/23/2018, 2:30 PM   History obtained from the patient      History of Present Illness: Vadim Devries III is a 42 y.o. 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 Pt states he fell out of the wheelchair and hit his head during his seizure. Symptoms are episodic and moderate in severity. No mitigating or exacerbating factors reported. Associated sxs include headache. Patient denies any tongue biting, drooling, dizziness, confusion, urinary or fecal incontinence, fever, chills, and all other sxs at this time. Prior Tx includes Dilantin. Pt reports running out of his meds for a month. No further complaints or concerns at this time.         Arrival mode: EMS      PCP: Petr Mckeon MD       Past Medical History:  Past Medical History:   Diagnosis Date    Diabetes mellitus     Hypertension     Seizures     Stroke      Past Surgical History:  History reviewed. No pertinent surgical history.    Past Family History:  History reviewed. No pertinent family history.    Social History:  Social History     Social History Main Topics    Smoking status: Never Smoker    Smokeless tobacco: Never Used    Alcohol use No    Drug use: No    Sexual activity: Unknown       ROS   Review of Systems   Constitutional: Negative for chills and fever.   HENT: Negative for sore throat.    Respiratory: Negative for shortness of breath.     Cardiovascular: Negative for chest pain.   Gastrointestinal: Negative for nausea.   Genitourinary: Negative for dysuria.   Musculoskeletal: Negative for back pain.   Skin: Negative for rash.   Neurological: Positive for seizures and headaches. Negative for dizziness, tremors, weakness and numbness.   Hematological: Does not bruise/bleed easily.   All other systems reviewed and are negative.      Physical Exam      Initial Vitals [04/23/18 1345]   BP Pulse Resp Temp SpO2   (!) 190/100 110 18 98.8 °F (37.1 °C) 98 %      MAP       130          Physical Exam  Nursing Notes and Vital Signs Reviewed.  Constitutional: Patient is in no apparent distress. Well-developed and well-nourished. Chronically in a wheelchair.   Head: Atraumatic. Normocephalic.  Eyes: PERRL. EOM intact. Conjunctivae are not pale. No scleral icterus.  ENT: Mucous membranes are moist. Oropharynx is clear and symmetric.    Neck: Supple. Full ROM. No lymphadenopathy.  Cardiovascular: Regular rate. Regular rhythm. No murmurs, rubs, or gallops. Distal pulses are 2+ and symmetric.  Pulmonary/Chest: No respiratory distress. Clear to auscultation bilaterally. No wheezing or rales.  Abdominal: Soft and non-distended.  There is no tenderness.  No rebound, guarding, or rigidity. Good bowel sounds.  Genitourinary: No CVA tenderness  Musculoskeletal: Moves all extremities. No obvious deformities. No edema. No calf tenderness.  Skin: Warm and dry.  Neurological:  Alert, awake, and appropriate. Normal speech.  No acute focal neurological deficits are appreciated.  Psychiatric: Normal affect. Good eye contact. Appropriate in content.    ED Course    Procedures  ED Vital Signs:  Vitals:    04/23/18 1345 04/23/18 1500 04/23/18 1509 04/23/18 1530   BP: (!) 190/100 (!) 226/121  (!) 207/118   Pulse: 110 100  94   Resp: 18 18  15   Temp: 98.8 °F (37.1 °C)      TempSrc: Oral      SpO2: 98% 99%  99%   Weight:   115.7 kg (255 lb)     04/23/18 1630 04/23/18 1700   BP: (!)  215/118 (!) 200/115   Pulse: 92 92   Resp: 18 19   Temp:     TempSrc:     SpO2: 100% 98%   Weight:         Abnormal Lab Results:  Labs Reviewed   CBC W/ AUTO DIFFERENTIAL - Abnormal; Notable for the following:        Result Value    MCV 80 (*)     Gran # (ANC) 8.1 (*)     Gran% 77.6 (*)     Lymph% 15.5 (*)     All other components within normal limits   COMPREHENSIVE METABOLIC PANEL - Abnormal; Notable for the following:     Glucose 296 (*)     Alkaline Phosphatase 147 (*)     All other components within normal limits   PHENYTOIN LEVEL, TOTAL - Abnormal; Notable for the following:     Phenytoin Lvl <0.1 (*)     All other components within normal limits   MAGNESIUM   TROPONIN I   CK   TROPONIN I   CK        All Lab Results:  Results for orders placed or performed during the hospital encounter of 04/23/18   CBC auto differential   Result Value Ref Range    WBC 10.47 3.90 - 12.70 K/uL    RBC 5.51 4.60 - 6.20 M/uL    Hemoglobin 15.1 14.0 - 18.0 g/dL    Hematocrit 44.0 40.0 - 54.0 %    MCV 80 (L) 82 - 98 fL    MCH 27.4 27.0 - 31.0 pg    MCHC 34.3 32.0 - 36.0 g/dL    RDW 12.6 11.5 - 14.5 %    Platelets 170 150 - 350 K/uL    MPV 11.3 9.2 - 12.9 fL    Gran # (ANC) 8.1 (H) 1.8 - 7.7 K/uL    Lymph # 1.6 1.0 - 4.8 K/uL    Mono # 0.6 0.3 - 1.0 K/uL    Eos # 0.1 0.0 - 0.5 K/uL    Baso # 0.04 0.00 - 0.20 K/uL    Gran% 77.6 (H) 38.0 - 73.0 %    Lymph% 15.5 (L) 18.0 - 48.0 %    Mono% 5.7 4.0 - 15.0 %    Eosinophil% 0.8 0.0 - 8.0 %    Basophil% 0.4 0.0 - 1.9 %    Differential Method Automated    Comprehensive metabolic panel   Result Value Ref Range    Sodium 139 136 - 145 mmol/L    Potassium 4.0 3.5 - 5.1 mmol/L    Chloride 104 95 - 110 mmol/L    CO2 24 23 - 29 mmol/L    Glucose 296 (H) 70 - 110 mg/dL    BUN, Bld 9 6 - 20 mg/dL    Creatinine 1.0 0.5 - 1.4 mg/dL    Calcium 9.8 8.7 - 10.5 mg/dL    Total Protein 7.9 6.0 - 8.4 g/dL    Albumin 4.1 3.5 - 5.2 g/dL    Total Bilirubin 0.6 0.1 - 1.0 mg/dL    Alkaline Phosphatase 147 (H) 55 -  135 U/L    AST 23 10 - 40 U/L    ALT 41 10 - 44 U/L    Anion Gap 11 8 - 16 mmol/L    eGFR if African American >60 >60 mL/min/1.73 m^2    eGFR if non African American >60 >60 mL/min/1.73 m^2   Magnesium   Result Value Ref Range    Magnesium 2.0 1.6 - 2.6 mg/dL   Phenytoin level, total   Result Value Ref Range    Phenytoin Lvl <0.1 (L) 10.0 - 20.0 ug/mL   Troponin I   Result Value Ref Range    Troponin I 0.008 0.000 - 0.026 ng/mL   CK   Result Value Ref Range     20 - 200 U/L         Imaging Results:  Imaging Results          CT Head Without Contrast (Final result)  Result time 04/23/18 16:12:01    Final result by Jhoan Hou MD (04/23/18 16:12:01)                 Impression:         No CT evidence of acute intracranial abnormality.        All CT scans at this facility use dose modulation, iterative reconstruction and/or weight based dosing when appropriate to reduce radiation dose to as low as reasonably achievable.      Electronically signed by: JHOAN HOU MD  Date:     04/23/18  Time:    16:12              Narrative:    Exam: CT scan of the head without contrast    Clinical History:  Seizures new or progressive .      Technique: Axial CT imaging was performed through the head without intravenous contrast.     Findings:    No hydrocephalus, midline shift, mass effect, or acute intracranial hemorrhage. Cortical sulcal pattern and gray-white matter differentiation is normal. Basilar cisterns and posterior fossa are normal. The visualized paranasal sinuses and mastoid air cells are clear. The skull is intact.                             X-Ray Chest 1 View (Final result)  Result time 04/23/18 15:41:55    Final result by Bud Mcgarry Jr., MD (04/23/18 15:41:55)                 Impression:          No acute findings       Electronically signed by: BUD MCGARRY MD  Date:     04/23/18  Time:    15:41              Narrative:    EXAM:   XR CHEST 1 VIEW    CLINICAL HISTORY:  htn       COMPARISON:   04/11/2017    FINDINGS:   Heart size is normal. Lungs appear clear of active disease.  No infiltrates or effusions.  No suspicious mass.  No significant changes are evident.                             The EKG was ordered, reviewed, and independently interpreted by the ED provider.  Interpretation time: 15:46  Rate: 93 BPM  Rhythm: normal sinus rhythm  Interpretation: Minimal voltage criteria for LVH. No STEMI.               The Emergency Provider reviewed the vital signs and test results, which are outlined above.    ED Discussion     5:16 PM: Reassessed pt at this time.  Pt states his condition has improved at this time. No seizure activity while in the ED. Pt is AAOx3, in NAD, and VSS. Discussed with pt all pertinent ED information and results. Discussed pt dx and plan of tx. Gave pt all f/u and return to the ED instructions. All questions and concerns were addressed at this time. Pt expresses understanding of information and instructions, and is comfortable with plan to discharge. Pt is stable for discharge.    Patient presents with seizure or seizure-like symptoms. I have no suspicion of an intracranial, traumatic, infectious, metabolic, toxic, cardiovascular (specifically arrhythmia), or other emergent medical condition requiring further intervention. Seizure precautions were discussed with the patient and/or caretaker, specifically not to swim unattended, not to operate motor vehicles or other machinery, and to avoid heights or other areas where falls may occur until cleared by primary care physician. Patient is safe for discharge.      ED Medication(s):  Medications   fosphenytoin (CEREBYX) 1,155 mg PE in dextrose 5 % 100 mL IVPB (0 mg PE/kg × 115.7 kg Intravenous Stopped 4/23/18 1658)   acetaminophen (10 mg/mL) injection 1,000 mg (0 mg Intravenous Stopped 4/23/18 1533)   labetalol injection 20 mg (20 mg Intravenous Given 4/23/18 1516)   diphenhydrAMINE injection 12.5 mg (12.5 mg Intravenous Given 4/23/18  1628)   cloNIDine tablet 0.2 mg (0.2 mg Oral Given 4/23/18 1706)       Current Discharge Medication List                Medical Decision Making    Medical Decision Making:   Clinical Tests:   Lab Tests: Ordered and Reviewed  Radiological Study: Ordered and Reviewed  Medical Tests: Reviewed and Ordered           Scribe Attestation:   Scribe #1: I performed the above scribed service and the documentation accurately describes the services I performed. I attest to the accuracy of the note.    Attending:   Physician Attestation Statement for Scribe #1: I, Stacie Claros MD, personally performed the services described in this documentation, as scribed by Julienne Miramontes, in my presence, and it is both accurate and complete.       Scribe Attestation:   Scribe #2: I performed the above scribed service and the documentation accurately describes the services I performed. I attest to the accuracy of the note.    Attending Attestation:           Physician Attestation for Scribe:    Physician Attestation Statement for Scribe #2: I, Stacie Claros MD, reviewed documentation, as scribed by Geeta Lacey in my presence, and it is both accurate and complete. I also acknowledge and confirm the content of the note done by Scribe #1.          Clinical Impression       ICD-10-CM ICD-9-CM   1. Seizure R56.9 780.39   2. Essential hypertension I10 401.9   3. Injury of head, initial encounter S09.90XA 959.01       Disposition:   Disposition: Discharged  Condition: Stable         Stacie Claros MD  04/24/18 1700

## 2019-09-27 ENCOUNTER — HOSPITAL ENCOUNTER (EMERGENCY)
Facility: HOSPITAL | Age: 44
Discharge: HOME | End: 2019-09-27
Attending: EMERGENCY MEDICINE
Payer: COMMERCIAL

## 2019-09-27 VITALS
DIASTOLIC BLOOD PRESSURE: 109 MMHG | HEART RATE: 101 BPM | SYSTOLIC BLOOD PRESSURE: 179 MMHG | WEIGHT: 230 LBS | TEMPERATURE: 99 F | OXYGEN SATURATION: 98 % | RESPIRATION RATE: 20 BRPM | HEIGHT: 72 IN | BODY MASS INDEX: 31.15 KG/M2

## 2019-09-27 DIAGNOSIS — R56.9 SEIZURE: Primary | ICD-10-CM

## 2019-09-27 DIAGNOSIS — R07.9 CHEST PAIN: ICD-10-CM

## 2019-09-27 DIAGNOSIS — I10 HYPERTENSION, UNSPECIFIED TYPE: ICD-10-CM

## 2019-09-27 DIAGNOSIS — Z91.148 NONCOMPLIANCE WITH MEDICATIONS: ICD-10-CM

## 2019-09-27 LAB
ALBUMIN SERPL BCP-MCNC: 3.7 G/DL (ref 3.5–5.2)
ALP SERPL-CCNC: 117 U/L (ref 55–135)
ALT SERPL W/O P-5'-P-CCNC: 31 U/L (ref 10–44)
ANION GAP SERPL CALC-SCNC: 13 MMOL/L (ref 8–16)
AST SERPL-CCNC: 21 U/L (ref 10–40)
BASOPHILS # BLD AUTO: 0.03 K/UL (ref 0–0.2)
BASOPHILS NFR BLD: 0.4 % (ref 0–1.9)
BILIRUB SERPL-MCNC: 0.5 MG/DL (ref 0.1–1)
BNP SERPL-MCNC: 114 PG/ML (ref 0–99)
BUN SERPL-MCNC: 10 MG/DL (ref 6–20)
CALCIUM SERPL-MCNC: 9.2 MG/DL (ref 8.7–10.5)
CHLORIDE SERPL-SCNC: 106 MMOL/L (ref 95–110)
CO2 SERPL-SCNC: 22 MMOL/L (ref 23–29)
CREAT SERPL-MCNC: 1.1 MG/DL (ref 0.5–1.4)
DIFFERENTIAL METHOD: ABNORMAL
EOSINOPHIL # BLD AUTO: 0.1 K/UL (ref 0–0.5)
EOSINOPHIL NFR BLD: 0.9 % (ref 0–8)
ERYTHROCYTE [DISTWIDTH] IN BLOOD BY AUTOMATED COUNT: 13.3 % (ref 11.5–14.5)
EST. GFR  (AFRICAN AMERICAN): >60 ML/MIN/1.73 M^2
EST. GFR  (NON AFRICAN AMERICAN): >60 ML/MIN/1.73 M^2
GLUCOSE SERPL-MCNC: 242 MG/DL (ref 70–110)
HCT VFR BLD AUTO: 42.9 % (ref 40–54)
HGB BLD-MCNC: 14.1 G/DL (ref 14–18)
LYMPHOCYTES # BLD AUTO: 1.8 K/UL (ref 1–4.8)
LYMPHOCYTES NFR BLD: 23.6 % (ref 18–48)
MCH RBC QN AUTO: 26.8 PG (ref 27–31)
MCHC RBC AUTO-ENTMCNC: 32.9 G/DL (ref 32–36)
MCV RBC AUTO: 81 FL (ref 82–98)
MONOCYTES # BLD AUTO: 0.6 K/UL (ref 0.3–1)
MONOCYTES NFR BLD: 7.4 % (ref 4–15)
NEUTROPHILS # BLD AUTO: 5.1 K/UL (ref 1.8–7.7)
NEUTROPHILS NFR BLD: 67.8 % (ref 38–73)
PLATELET # BLD AUTO: 154 K/UL (ref 150–350)
PMV BLD AUTO: 12.3 FL (ref 9.2–12.9)
POCT GLUCOSE: 230 MG/DL (ref 70–110)
POTASSIUM SERPL-SCNC: 3.8 MMOL/L (ref 3.5–5.1)
PROT SERPL-MCNC: 7.2 G/DL (ref 6–8.4)
RBC # BLD AUTO: 5.27 M/UL (ref 4.6–6.2)
SODIUM SERPL-SCNC: 141 MMOL/L (ref 136–145)
TROPONIN I SERPL DL<=0.01 NG/ML-MCNC: 0.01 NG/ML (ref 0–0.03)
TROPONIN I SERPL DL<=0.01 NG/ML-MCNC: 0.02 NG/ML (ref 0–0.03)
WBC # BLD AUTO: 7.57 K/UL (ref 3.9–12.7)

## 2019-09-27 PROCEDURE — 93010 EKG 12-LEAD: ICD-10-PCS | Mod: ,,, | Performed by: INTERNAL MEDICINE

## 2019-09-27 PROCEDURE — 25000003 PHARM REV CODE 250: Performed by: EMERGENCY MEDICINE

## 2019-09-27 PROCEDURE — 82962 GLUCOSE BLOOD TEST: CPT

## 2019-09-27 PROCEDURE — 99285 EMERGENCY DEPT VISIT HI MDM: CPT | Mod: 25

## 2019-09-27 PROCEDURE — 96375 TX/PRO/DX INJ NEW DRUG ADDON: CPT

## 2019-09-27 PROCEDURE — 96365 THER/PROPH/DIAG IV INF INIT: CPT

## 2019-09-27 PROCEDURE — 85025 COMPLETE CBC W/AUTO DIFF WBC: CPT

## 2019-09-27 PROCEDURE — 93005 ELECTROCARDIOGRAM TRACING: CPT

## 2019-09-27 PROCEDURE — 84484 ASSAY OF TROPONIN QUANT: CPT

## 2019-09-27 PROCEDURE — 63600175 PHARM REV CODE 636 W HCPCS: Performed by: EMERGENCY MEDICINE

## 2019-09-27 PROCEDURE — 83880 ASSAY OF NATRIURETIC PEPTIDE: CPT

## 2019-09-27 PROCEDURE — 93010 ELECTROCARDIOGRAM REPORT: CPT | Mod: ,,, | Performed by: INTERNAL MEDICINE

## 2019-09-27 PROCEDURE — 80053 COMPREHEN METABOLIC PANEL: CPT

## 2019-09-27 PROCEDURE — 36415 COLL VENOUS BLD VENIPUNCTURE: CPT

## 2019-09-27 RX ORDER — LISINOPRIL 10 MG/1
10 TABLET ORAL DAILY
Qty: 30 TABLET | Refills: 0 | Status: SHIPPED | OUTPATIENT
Start: 2019-09-27 | End: 2020-10-24 | Stop reason: SDUPTHER

## 2019-09-27 RX ORDER — SIMVASTATIN 10 MG/1
10 TABLET, FILM COATED ORAL NIGHTLY
Qty: 30 TABLET | Refills: 0 | Status: SHIPPED | OUTPATIENT
Start: 2019-09-27 | End: 2023-03-08

## 2019-09-27 RX ORDER — AMLODIPINE BESYLATE 10 MG/1
10 TABLET ORAL DAILY
Qty: 30 TABLET | Refills: 0 | Status: SHIPPED | OUTPATIENT
Start: 2019-09-27 | End: 2020-10-24 | Stop reason: SDUPTHER

## 2019-09-27 RX ORDER — DIPHENHYDRAMINE HYDROCHLORIDE 50 MG/ML
12.5 INJECTION INTRAMUSCULAR; INTRAVENOUS
Status: COMPLETED | OUTPATIENT
Start: 2019-09-27 | End: 2019-09-27

## 2019-09-27 RX ORDER — LABETALOL 100 MG/1
100 TABLET, FILM COATED ORAL 2 TIMES DAILY
Qty: 30 TABLET | Refills: 0 | Status: SHIPPED | OUTPATIENT
Start: 2019-09-27 | End: 2020-10-24 | Stop reason: SDUPTHER

## 2019-09-27 RX ORDER — AMLODIPINE BESYLATE 5 MG/1
10 TABLET ORAL
Status: COMPLETED | OUTPATIENT
Start: 2019-09-27 | End: 2019-09-27

## 2019-09-27 RX ORDER — METFORMIN HYDROCHLORIDE 500 MG/1
500 TABLET ORAL 2 TIMES DAILY WITH MEALS
Qty: 60 TABLET | Refills: 0 | Status: SHIPPED | OUTPATIENT
Start: 2019-09-27

## 2019-09-27 RX ORDER — PHENYTOIN SODIUM 100 MG/1
200 CAPSULE, EXTENDED RELEASE ORAL 3 TIMES DAILY
Qty: 180 CAPSULE | Refills: 0 | Status: SHIPPED | OUTPATIENT
Start: 2019-09-27 | End: 2020-10-24 | Stop reason: SDUPTHER

## 2019-09-27 RX ORDER — CLONIDINE HYDROCHLORIDE 0.1 MG/1
0.1 TABLET ORAL 2 TIMES DAILY
Qty: 60 TABLET | Refills: 0 | Status: SHIPPED | OUTPATIENT
Start: 2019-09-27 | End: 2020-10-24 | Stop reason: SDUPTHER

## 2019-09-27 RX ORDER — LABETALOL 100 MG/1
100 TABLET, FILM COATED ORAL
Status: COMPLETED | OUTPATIENT
Start: 2019-09-27 | End: 2019-09-27

## 2019-09-27 RX ORDER — ASPIRIN 325 MG
325 TABLET ORAL
Status: COMPLETED | OUTPATIENT
Start: 2019-09-27 | End: 2019-09-27

## 2019-09-27 RX ORDER — CLONIDINE HYDROCHLORIDE 0.2 MG/1
0.2 TABLET ORAL
Status: COMPLETED | OUTPATIENT
Start: 2019-09-27 | End: 2019-09-27

## 2019-09-27 RX ADMIN — ASPIRIN 325 MG ORAL TABLET 325 MG: 325 PILL ORAL at 12:09

## 2019-09-27 RX ADMIN — DIPHENHYDRAMINE HYDROCHLORIDE 12.5 MG: 50 INJECTION INTRAMUSCULAR; INTRAVENOUS at 01:09

## 2019-09-27 RX ADMIN — LORAZEPAM 1 MG: 2 INJECTION INTRAMUSCULAR; INTRAVENOUS at 12:09

## 2019-09-27 RX ADMIN — AMLODIPINE BESYLATE 10 MG: 5 TABLET ORAL at 02:09

## 2019-09-27 RX ADMIN — NITROGLYCERIN 1 INCH: 20 OINTMENT TOPICAL at 12:09

## 2019-09-27 RX ADMIN — LABETALOL HYDROCHLORIDE 100 MG: 100 TABLET, FILM COATED ORAL at 02:09

## 2019-09-27 RX ADMIN — DEXTROSE 1045 MG PE: 50 INJECTION, SOLUTION INTRAVENOUS at 01:09

## 2019-09-27 RX ADMIN — CLONIDINE HYDROCHLORIDE 0.2 MG: 0.2 TABLET ORAL at 01:09

## 2019-09-27 NOTE — ED PROVIDER NOTES
SCRIBE #1 NOTE: I, Corinne Mack/Torrie Lyman, am scribing for, and in the presence of, Onur Felipe MD. I have scribed the entire note.       History     Chief Complaint   Patient presents with    Seizures     pt states he hasn't been taking his seizure medicine      Review of patient's allergies indicates:  No Known Allergies      History of Present Illness     HPI    9/27/2019, 12:38 PM  History obtained from the patient      History of Present Illness: Vadim Devries III is a 44 y.o. male patient with a PMHx of seizures who presents to the Emergency Department for seizures which onset suddenly PTA. 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 Pt reports he has been out of all of his daily medications for the past few months, including his Dilantin. Symptoms are episodic and moderate in severity. No mitigating or exacerbating factors reported. Associated sxs include CP which onset a few days ago. Patient denies any tongue biting, cough, diaphoresis, palpitations, SOB, leg pain/swelling, extremity weakness/numbness, dizziness, bowel/bladder incontinence, N/V, and all other sxs at this time. No prior Tx reported. No further complaints or concerns at this time.         Arrival mode: Ambulatory service    PCP: Petr Mckeon MD        Past Medical History:  Past Medical History:   Diagnosis Date    Diabetes mellitus     Hypertension     Seizures     Stroke        Past Surgical History:  History reviewed. No pertinent past surgical history.       Family History:  History reviewed. No pertinent family history.       Social History:  Social History     Tobacco Use    Smoking status: Never Smoker    Smokeless tobacco: Never Used   Substance and Sexual Activity    Alcohol use: No    Drug use: No    Sexual activity: Unknown        Review of Systems     Review of Systems   Constitutional: Negative for diaphoresis and fever.   HENT: Negative for  nosebleeds and sore throat.         (-) tongue biting   Respiratory: Negative for cough and shortness of breath.    Cardiovascular: Positive for chest pain. Negative for palpitations and leg swelling.   Gastrointestinal: Negative for nausea and vomiting.   Genitourinary: Negative for dysuria.   Musculoskeletal: Negative for back pain.        - leg pain   Skin: Negative for rash.   Neurological: Positive for seizures. Negative for dizziness, weakness (extremities) and numbness (extremities).        (-) bowel/bladder incontinence   Hematological: Does not bruise/bleed easily.   All other systems reviewed and are negative.       Physical Exam     Initial Vitals [09/27/19 1227]   BP Pulse Resp Temp SpO2   (!) 208/109 92 16 97.7 °F (36.5 °C) 100 %      MAP       --          Physical Exam  Nursing Notes and Vital Signs Reviewed.  Constitutional: Patient is in no acute distress. Well-developed and well-nourished.  Head: Atraumatic. Normocephalic.  Eyes: PERRL. EOM intact. Conjunctivae are not pale. No scleral icterus.  ENT: Mucous membranes are moist. Oropharynx is clear and symmetric.    Neck: Supple. Full ROM. No lymphadenopathy.  Cardiovascular: Regular rate. Regular rhythm. No murmurs, rubs, or gallops. Distal pulses are 2+ and symmetric.  Pulmonary/Chest: No respiratory distress. Clear to auscultation bilaterally. No wheezing or rales.  Abdominal: Soft and non-distended.  There is no tenderness.  No rebound, guarding, or rigidity.   Musculoskeletal: Moves all extremities. No obvious deformities. No edema.   Skin: Warm and dry.  Neurological:  Alert, awake, and appropriate.  Normal speech.  No acute focal neurological deficits are appreciated.  Psychiatric: Normal affect. Good eye contact. Appropriate in content.     ED Course   Procedures  ED Vital Signs:  Vitals:    09/27/19 1224 09/27/19 1227 09/27/19 1228 09/27/19 1240   BP:  (!) 208/109 (!) 208/109    Pulse:  92 97 95   Resp:  16 13    Temp:  97.7 °F (36.5 °C)      TempSrc:  Oral     SpO2:  100% 99%    Weight: 104.3 kg (230 lb)      Height: 6' (1.829 m)       09/27/19 1302 09/27/19 1347 09/27/19 1355 09/27/19 1402   BP: (!) 192/109 (!) 183/100 (!) 183/100 (!) 183/111   Pulse: 95 94  98   Resp: 14 19 19   Temp:       TempSrc:       SpO2: 99% 97%  97%   Weight:       Height:        09/27/19 1413 09/27/19 1425 09/27/19 1503   BP: (!) 183/11 (!) 184/108 (!) 183/100   Pulse: 97 92 97   Resp: 18  (!) 22   Temp: 98.6 °F (37 °C)     TempSrc: Oral     SpO2: 97%  97%   Weight:      Height:          Abnormal Lab Results:  Labs Reviewed   CBC W/ AUTO DIFFERENTIAL - Abnormal; Notable for the following components:       Result Value    Mean Corpuscular Volume 81 (*)     Mean Corpuscular Hemoglobin 26.8 (*)     All other components within normal limits   COMPREHENSIVE METABOLIC PANEL - Abnormal; Notable for the following components:    CO2 22 (*)     Glucose 242 (*)     All other components within normal limits   B-TYPE NATRIURETIC PEPTIDE - Abnormal; Notable for the following components:     (*)     All other components within normal limits   POCT GLUCOSE - Abnormal; Notable for the following components:    POCT Glucose 230 (*)     All other components within normal limits   TROPONIN I   TROPONIN I        All Lab Results:  Results for orders placed or performed during the hospital encounter of 09/27/19   CBC auto differential   Result Value Ref Range    WBC 7.57 3.90 - 12.70 K/uL    RBC 5.27 4.60 - 6.20 M/uL    Hemoglobin 14.1 14.0 - 18.0 g/dL    Hematocrit 42.9 40.0 - 54.0 %    Mean Corpuscular Volume 81 (L) 82 - 98 fL    Mean Corpuscular Hemoglobin 26.8 (L) 27.0 - 31.0 pg    Mean Corpuscular Hemoglobin Conc 32.9 32.0 - 36.0 g/dL    RDW 13.3 11.5 - 14.5 %    Platelets 154 150 - 350 K/uL    MPV 12.3 9.2 - 12.9 fL    Gran # (ANC) 5.1 1.8 - 7.7 K/uL    Lymph # 1.8 1.0 - 4.8 K/uL    Mono # 0.6 0.3 - 1.0 K/uL    Eos # 0.1 0.0 - 0.5 K/uL    Baso # 0.03 0.00 - 0.20 K/uL    Gran% 67.8  38.0 - 73.0 %    Lymph% 23.6 18.0 - 48.0 %    Mono% 7.4 4.0 - 15.0 %    Eosinophil% 0.9 0.0 - 8.0 %    Basophil% 0.4 0.0 - 1.9 %    Differential Method Automated    Comprehensive metabolic panel   Result Value Ref Range    Sodium 141 136 - 145 mmol/L    Potassium 3.8 3.5 - 5.1 mmol/L    Chloride 106 95 - 110 mmol/L    CO2 22 (L) 23 - 29 mmol/L    Glucose 242 (H) 70 - 110 mg/dL    BUN, Bld 10 6 - 20 mg/dL    Creatinine 1.1 0.5 - 1.4 mg/dL    Calcium 9.2 8.7 - 10.5 mg/dL    Total Protein 7.2 6.0 - 8.4 g/dL    Albumin 3.7 3.5 - 5.2 g/dL    Total Bilirubin 0.5 0.1 - 1.0 mg/dL    Alkaline Phosphatase 117 55 - 135 U/L    AST 21 10 - 40 U/L    ALT 31 10 - 44 U/L    Anion Gap 13 8 - 16 mmol/L    eGFR if African American >60 >60 mL/min/1.73 m^2    eGFR if non African American >60 >60 mL/min/1.73 m^2   Troponin I #1   Result Value Ref Range    Troponin I 0.021 0.000 - 0.026 ng/mL   B-Type natriuretic peptide (BNP)   Result Value Ref Range     (H) 0 - 99 pg/mL   POCT glucose   Result Value Ref Range    POCT Glucose 230 (H) 70 - 110 mg/dL         Imaging Results:  Imaging Results          X-Ray Chest 1 View (Final result)  Result time 09/27/19 13:07:20    Final result by Mundo Garcia MD (09/27/19 13:07:20)                 Impression:      Cardiomegaly.      Electronically signed by: Mundo Garcia MD  Date:    09/27/2019  Time:    13:07             Narrative:    EXAMINATION:  XR CHEST 1 VIEW    CLINICAL HISTORY:  Unspecified convulsions    FINDINGS:  Single view of the chest.   Aorta demonstrates atherosclerotic disease.    Cardiac silhouette is enlarged.  The lungs demonstrate no evidence of active disease.  No evidence of pleural effusion or pneumothorax.  Bones appear intact.                                 The EKG was ordered, reviewed, and independently interpreted by the ED provider.  Interpretation time: 12:44  Rate: 91 BPM  Rhythm: normal sinus rhythm  Interpretation: Nonspecific T wave abnormality. No  STEMI.           The Emergency Provider reviewed the vital signs and test results, which are outlined above.     ED Discussion       3:07 PM: Reassessed pt at this time. Pt is awake, alert, and in no distress. Discussed with pt all pertinent ED information and results. Discussed pt dx and plan of tx. Gave pt all f/u and return to the ED instructions. All questions and concerns were addressed at this time. Pt expresses understanding of information and instructions, and is comfortable with plan to discharge. Pt is stable for discharge.    I discussed with patient and/or family/caretaker that evaluation in the ED does not suggest any emergent or life threatening medical conditions requiring immediate intervention beyond what was provided in the ED, and I believe patient is safe for discharge.  Regardless, an unremarkable evaluation in the ED does not preclude the development or presence of a serious of life threatening condition. As such, patient was instructed to return immediately for any worsening or change in current symptoms.    Patient presents with seizure or seizure-like symptoms. I have no suspicion of an intracranial, traumatic, infectious, metabolic, toxic, cardiovascular (specifically arrhythmia), or other emergent medical condition requiring further intervention. Seizure precautions were discussed with the patient and/or caretaker, specifically not to swim unattended, not to operate motor vehicles or other machinery, and to avoid heights or other areas where falls may occur until cleared by primary care physician. Patient is safe for discharge.         Medical Decision Making:   Clinical Tests:   Lab Tests: Ordered and Reviewed  Radiological Study: Ordered and Reviewed  Medical Tests: Ordered and Reviewed           ED Medication(s):  Medications   aspirin tablet 325 mg (325 mg Oral Given 9/27/19 1249)   nitroGLYCERIN 2% TD oint ointment 1 inch (1 inch Topical (Top) Given 9/27/19 1249)   fosphenytoin  (CEREBYX) 1,045 mg PE in dextrose 5 % 100 mL IVPB (0 mg PE/kg × 104.3 kg Intravenous Stopped 9/27/19 1425)   diphenhydrAMINE injection 12.5 mg (12.5 mg Intravenous Given 9/27/19 1310)   lorazepam (ATIVAN) injection 1 mg (1 mg Intravenous Given 9/27/19 1251)   cloNIDine tablet 0.2 mg (0.2 mg Oral Given 9/27/19 1355)   amLODIPine tablet 10 mg (10 mg Oral Given 9/27/19 1425)   labetalol tablet 100 mg (100 mg Oral Given 9/27/19 1425)       Current Discharge Medication List          Follow-up Information     Petr Mckeon MD. Call in 1 day.    Specialty:  Family Medicine  Contact information:  [address] [facility] [id number] [phone]             [facility]    Specialty:  Emergency Medicine  Why:  If symptoms worsen  Contact information:  [address]  [place] [address] [phone]           Cardiology. Call in 1 day.                     Scribe Attestation:   Scribe #1: I performed the above scribed service and the documentation accurately describes the services I performed. I attest to the accuracy of the note.     Attending:   Physician Attestation Statement for Scribe #1: I, Onur Felipe,*, personally performed the services described in this documentation, as scribed by Corinne Mack/Torrie Lyman, in my presence, and it is both accurate and complete.           Clinical Impression       ICD-10-CM ICD-9-CM   1. Seizure R56.9 780.39   2. Chest pain R07.9 786.50   3. Noncompliance with medications Z91.14 V15.81   4. Hypertension, unspecified type I10 401.9       Disposition:   Disposition: Discharged  Condition: Stable         Onur Felipe MD  09/29/19 0022

## 2019-09-27 NOTE — ED NOTES
Oriented to room and plan of care, verbalized understanding. Cart in low position, siderails up x 2 and call bell in reach. Will continue to monitor. Seizure precautions in place with siderails padded.

## 2020-10-24 ENCOUNTER — HOSPITAL ENCOUNTER (EMERGENCY)
Facility: HOSPITAL | Age: 45
Discharge: HOME | End: 2020-10-24
Attending: STUDENT IN AN ORGANIZED HEALTH CARE EDUCATION/TRAINING PROGRAM
Payer: COMMERCIAL

## 2020-10-24 VITALS
RESPIRATION RATE: 21 BRPM | DIASTOLIC BLOOD PRESSURE: 106 MMHG | OXYGEN SATURATION: 100 % | HEIGHT: 72 IN | WEIGHT: 223.56 LBS | BODY MASS INDEX: 30.28 KG/M2 | SYSTOLIC BLOOD PRESSURE: 192 MMHG | HEART RATE: 90 BPM | TEMPERATURE: 99 F

## 2020-10-24 DIAGNOSIS — R56.9 SEIZURE: Primary | ICD-10-CM

## 2020-10-24 DIAGNOSIS — I16.0 HYPERTENSIVE URGENCY: ICD-10-CM

## 2020-10-24 DIAGNOSIS — R07.9 CHEST PAIN: ICD-10-CM

## 2020-10-24 LAB
ALBUMIN SERPL BCP-MCNC: 3.9 G/DL (ref 3.5–5.2)
ALP SERPL-CCNC: 161 U/L (ref 55–135)
ALT SERPL W/O P-5'-P-CCNC: 22 U/L (ref 10–44)
ANION GAP SERPL CALC-SCNC: 10 MMOL/L (ref 8–16)
AST SERPL-CCNC: 19 U/L (ref 10–40)
BASOPHILS # BLD AUTO: 0.05 K/UL (ref 0–0.2)
BASOPHILS NFR BLD: 0.6 % (ref 0–1.9)
BILIRUB SERPL-MCNC: 0.4 MG/DL (ref 0.1–1)
BUN SERPL-MCNC: 11 MG/DL (ref 6–20)
CALCIUM SERPL-MCNC: 9.6 MG/DL (ref 8.7–10.5)
CHLORIDE SERPL-SCNC: 102 MMOL/L (ref 95–110)
CO2 SERPL-SCNC: 27 MMOL/L (ref 23–29)
CREAT SERPL-MCNC: 1.3 MG/DL (ref 0.5–1.4)
DIFFERENTIAL METHOD: ABNORMAL
EOSINOPHIL # BLD AUTO: 0.1 K/UL (ref 0–0.5)
EOSINOPHIL NFR BLD: 1.3 % (ref 0–8)
ERYTHROCYTE [DISTWIDTH] IN BLOOD BY AUTOMATED COUNT: 12.1 % (ref 11.5–14.5)
EST. GFR  (AFRICAN AMERICAN): >60 ML/MIN/1.73 M^2
EST. GFR  (NON AFRICAN AMERICAN): >60 ML/MIN/1.73 M^2
GLUCOSE SERPL-MCNC: 300 MG/DL (ref 70–110)
HCT VFR BLD AUTO: 41.8 % (ref 40–54)
HCV AB SERPL QL IA: NEGATIVE
HGB BLD-MCNC: 13.4 G/DL (ref 14–18)
HIV 1+2 AB+HIV1 P24 AG SERPL QL IA: NEGATIVE
IMM GRANULOCYTES # BLD AUTO: 0.02 K/UL (ref 0–0.04)
IMM GRANULOCYTES NFR BLD AUTO: 0.3 % (ref 0–0.5)
LIPASE SERPL-CCNC: 20 U/L (ref 4–60)
LYMPHOCYTES # BLD AUTO: 1.9 K/UL (ref 1–4.8)
LYMPHOCYTES NFR BLD: 24 % (ref 18–48)
MCH RBC QN AUTO: 26.5 PG (ref 27–31)
MCHC RBC AUTO-ENTMCNC: 32.1 G/DL (ref 32–36)
MCV RBC AUTO: 83 FL (ref 82–98)
MONOCYTES # BLD AUTO: 0.6 K/UL (ref 0.3–1)
MONOCYTES NFR BLD: 7.3 % (ref 4–15)
NEUTROPHILS # BLD AUTO: 5.3 K/UL (ref 1.8–7.7)
NEUTROPHILS NFR BLD: 66.5 % (ref 38–73)
NRBC BLD-RTO: 0 /100 WBC
PLATELET # BLD AUTO: 192 K/UL (ref 150–350)
PMV BLD AUTO: 12 FL (ref 9.2–12.9)
POTASSIUM SERPL-SCNC: 4 MMOL/L (ref 3.5–5.1)
PROT SERPL-MCNC: 7.7 G/DL (ref 6–8.4)
RBC # BLD AUTO: 5.06 M/UL (ref 4.6–6.2)
SODIUM SERPL-SCNC: 139 MMOL/L (ref 136–145)
TROPONIN I SERPL DL<=0.01 NG/ML-MCNC: 0.02 NG/ML (ref 0–0.03)
WBC # BLD AUTO: 7.93 K/UL (ref 3.9–12.7)

## 2020-10-24 PROCEDURE — 85025 COMPLETE CBC W/AUTO DIFF WBC: CPT

## 2020-10-24 PROCEDURE — 86703 HIV-1/HIV-2 1 RESULT ANTBDY: CPT

## 2020-10-24 PROCEDURE — 93010 EKG 12-LEAD: ICD-10-PCS | Mod: ,,, | Performed by: INTERNAL MEDICINE

## 2020-10-24 PROCEDURE — 36415 COLL VENOUS BLD VENIPUNCTURE: CPT

## 2020-10-24 PROCEDURE — 93010 ELECTROCARDIOGRAM REPORT: CPT | Mod: ,,, | Performed by: INTERNAL MEDICINE

## 2020-10-24 PROCEDURE — 83690 ASSAY OF LIPASE: CPT

## 2020-10-24 PROCEDURE — 80053 COMPREHEN METABOLIC PANEL: CPT

## 2020-10-24 PROCEDURE — 99284 EMERGENCY DEPT VISIT MOD MDM: CPT | Mod: 25

## 2020-10-24 PROCEDURE — 96360 HYDRATION IV INFUSION INIT: CPT

## 2020-10-24 PROCEDURE — 25000003 PHARM REV CODE 250: Performed by: STUDENT IN AN ORGANIZED HEALTH CARE EDUCATION/TRAINING PROGRAM

## 2020-10-24 PROCEDURE — 86803 HEPATITIS C AB TEST: CPT

## 2020-10-24 PROCEDURE — 84484 ASSAY OF TROPONIN QUANT: CPT

## 2020-10-24 PROCEDURE — 93005 ELECTROCARDIOGRAM TRACING: CPT

## 2020-10-24 RX ORDER — CLONIDINE HYDROCHLORIDE 0.1 MG/1
0.1 TABLET ORAL 2 TIMES DAILY
Qty: 60 TABLET | Refills: 0 | Status: ON HOLD | OUTPATIENT
Start: 2020-10-24 | End: 2021-12-01 | Stop reason: HOSPADM

## 2020-10-24 RX ORDER — PHENYTOIN SODIUM 100 MG/1
100 CAPSULE, EXTENDED RELEASE ORAL
Status: COMPLETED | OUTPATIENT
Start: 2020-10-24 | End: 2020-10-24

## 2020-10-24 RX ORDER — LABETALOL 100 MG/1
100 TABLET, FILM COATED ORAL 2 TIMES DAILY
Qty: 30 TABLET | Refills: 0 | OUTPATIENT
Start: 2020-10-24 | End: 2021-04-01

## 2020-10-24 RX ORDER — LISINOPRIL 10 MG/1
10 TABLET ORAL
Status: COMPLETED | OUTPATIENT
Start: 2020-10-24 | End: 2020-10-24

## 2020-10-24 RX ORDER — CLONIDINE HYDROCHLORIDE 0.1 MG/1
0.1 TABLET ORAL
Status: COMPLETED | OUTPATIENT
Start: 2020-10-24 | End: 2020-10-24

## 2020-10-24 RX ORDER — LISINOPRIL 10 MG/1
10 TABLET ORAL DAILY
Qty: 30 TABLET | Refills: 0 | OUTPATIENT
Start: 2020-10-24 | End: 2021-04-01

## 2020-10-24 RX ORDER — PHENYTOIN SODIUM 100 MG/1
200 CAPSULE, EXTENDED RELEASE ORAL 3 TIMES DAILY
Qty: 180 CAPSULE | Refills: 0 | Status: SHIPPED | OUTPATIENT
Start: 2020-10-24

## 2020-10-24 RX ORDER — AMLODIPINE BESYLATE 10 MG/1
10 TABLET ORAL DAILY
Qty: 30 TABLET | Refills: 0 | OUTPATIENT
Start: 2020-10-24 | End: 2021-04-01

## 2020-10-24 RX ORDER — LABETALOL 100 MG/1
100 TABLET, FILM COATED ORAL
Status: COMPLETED | OUTPATIENT
Start: 2020-10-24 | End: 2020-10-24

## 2020-10-24 RX ORDER — AMLODIPINE BESYLATE 5 MG/1
10 TABLET ORAL
Status: COMPLETED | OUTPATIENT
Start: 2020-10-24 | End: 2020-10-24

## 2020-10-24 RX ADMIN — CLONIDINE HYDROCHLORIDE 0.1 MG: 0.1 TABLET ORAL at 07:10

## 2020-10-24 RX ADMIN — LABETALOL HYDROCHLORIDE 100 MG: 100 TABLET, FILM COATED ORAL at 07:10

## 2020-10-24 RX ADMIN — SODIUM CHLORIDE 1000 ML: 0.9 INJECTION, SOLUTION INTRAVENOUS at 05:10

## 2020-10-24 RX ADMIN — AMLODIPINE BESYLATE 10 MG: 5 TABLET ORAL at 07:10

## 2020-10-24 RX ADMIN — LISINOPRIL 10 MG: 10 TABLET ORAL at 07:10

## 2020-10-24 RX ADMIN — PHENYTOIN SODIUM 100 MG: 100 CAPSULE ORAL at 05:10

## 2020-10-24 NOTE — ED PROVIDER NOTES
"SCRIBE #1 NOTE: I, Tori Barros, am scribing for, and in the presence of, Clifford Matthew MD. I have scribed the entire note.       History     Chief Complaint   Patient presents with    Chest Pain     States chest pain with associated headache today.  States has not taken his meds as he should.    Seizures     States may have had a seizure last night due to being tired today.     Review of patient's allergies indicates:  No Known Allergies      History of Present Illness     HPI    10/24/2020, 4:31 PM  History obtained from the patient      History of Present Illness: Vadim Devries III is a 45 y.o. male patient with a PMHx of DM, HTN, seizures, stroke and asthma who presents to the Emergency Department for evaluation of R sided CP which onset x1 week ago. Symptoms are intermittent and moderate in severity. No mitigating or exacerbating factors reported. Associated sxs include HA, L arm numbness and SOB. Pt also states he thinks he may have had a seizure last night and a  "couple small seizures" x1-2 hours PTA. Sxs are episodic and moderate in severity. No mitigating or exacerbating factors reported. Patient denies any tongue biting, drooling, dizziness, light-headedness, palpitations, leg swelling, fever, chills, n/v/d, abd pain, and all other sxs at this time. Reports being non-compliant with meds. No further complaints or concerns at this time.       Arrival mode: Ambulance services     PCP: Petr Mckeon MD        Past Medical History:  Past Medical History:   Diagnosis Date    Diabetes mellitus     Hypertension     Seizures     Stroke        Past Surgical History:  History reviewed. No pertinent surgical history.      Family History:  History reviewed. No pertinent family history.    Social History:  Social History     Tobacco Use    Smoking status: Never Smoker    Smokeless tobacco: Never Used   Substance and Sexual Activity    Alcohol use: No    Drug use: No    Sexual activity: Unknown "        Review of Systems     Review of Systems   Constitutional: Negative for chills and fever.   HENT: Negative for drooling and sore throat.         (-) tongue biting   Respiratory: Positive for shortness of breath.    Cardiovascular: Positive for chest pain (R sided). Negative for palpitations and leg swelling.   Gastrointestinal: Negative for abdominal pain, diarrhea, nausea and vomiting.   Genitourinary: Negative for dysuria.   Musculoskeletal: Negative for back pain.   Skin: Negative for rash.   Neurological: Positive for seizures and headaches. Negative for weakness.   Hematological: Does not bruise/bleed easily.   All other systems reviewed and are negative.     Physical Exam     Initial Vitals [10/24/20 1621]   BP Pulse Resp Temp SpO2   (!) 200/120 90 16 98.5 °F (36.9 °C) 100 %      MAP       --          Physical Exam  Nursing Notes and Vital Signs Reviewed.  Constitutional: Patient is in no acute distress. Well-developed and well-nourished.  Head: Atraumatic. Normocephalic.  Eyes: PERRL. EOM intact. Conjunctivae are not pale. No scleral icterus.  ENT: Mucous membranes are moist. Oropharynx is clear and symmetric.    Neck: Supple. Full ROM. No lymphadenopathy.  Cardiovascular: Regular rate. Regular rhythm. No murmurs, rubs, or gallops. Distal pulses are 2+ and symmetric.  Pulmonary/Chest: No respiratory distress. Clear to auscultation bilaterally. No wheezing or rales.  Abdominal: Soft and non-distended.  There is no tenderness.  No rebound, guarding, or rigidity. Good bowel sounds.  Genitourinary: No CVA tenderness  Musculoskeletal: Moves all extremities. No obvious deformities. No edema. No calf tenderness.  Skin: Warm and dry.  Neurological:  Alert, awake, and appropriate.  Normal speech.  No acute focal neurological deficits are appreciated.  Psychiatric: Normal affect. Good eye contact. Appropriate in content.     ED Course   Procedures  ED Vital Signs:  Vitals:    10/24/20 1621 10/24/20 1744 10/24/20  1745 10/24/20 1802   BP: (!) 200/120 (!) 196/103  (!) 200/107   Pulse: 90 76  81   Resp: 16 18  18   Temp: 98.5 °F (36.9 °C)      TempSrc: Oral      SpO2: 100% 100%  100%   Weight:   101.4 kg (223 lb 8.7 oz)    Height: 6' (1.829 m)       10/24/20 1902 10/24/20 1944   BP: (!) 201/116 (!) 177/109   Pulse: 86 79   Resp: 18 14   Temp:     TempSrc:     SpO2: 100% 100%   Weight:     Height:         Abnormal Lab Results:  Labs Reviewed   CBC W/ AUTO DIFFERENTIAL - Abnormal; Notable for the following components:       Result Value    Hemoglobin 13.4 (*)     Mean Corpuscular Hemoglobin 26.5 (*)     All other components within normal limits   COMPREHENSIVE METABOLIC PANEL - Abnormal; Notable for the following components:    Glucose 300 (*)     Alkaline Phosphatase 161 (*)     All other components within normal limits   HIV 1 / 2 ANTIBODY   HEPATITIS C ANTIBODY   TROPONIN I   LIPASE        All Lab Results:  Results for orders placed or performed during the hospital encounter of 10/24/20   HIV 1/2 Ag/Ab (4th Gen)   Result Value Ref Range    HIV 1/2 Ag/Ab Negative Negative   Hepatitis C antibody   Result Value Ref Range    Hepatitis C Ab Negative Negative   CBC auto differential   Result Value Ref Range    WBC 7.93 3.90 - 12.70 K/uL    RBC 5.06 4.60 - 6.20 M/uL    Hemoglobin 13.4 (L) 14.0 - 18.0 g/dL    Hematocrit 41.8 40.0 - 54.0 %    Mean Corpuscular Volume 83 82 - 98 fL    Mean Corpuscular Hemoglobin 26.5 (L) 27.0 - 31.0 pg    Mean Corpuscular Hemoglobin Conc 32.1 32.0 - 36.0 g/dL    RDW 12.1 11.5 - 14.5 %    Platelets 192 150 - 350 K/uL    MPV 12.0 9.2 - 12.9 fL    Immature Granulocytes 0.3 0.0 - 0.5 %    Gran # (ANC) 5.3 1.8 - 7.7 K/uL    Immature Grans (Abs) 0.02 0.00 - 0.04 K/uL    Lymph # 1.9 1.0 - 4.8 K/uL    Mono # 0.6 0.3 - 1.0 K/uL    Eos # 0.1 0.0 - 0.5 K/uL    Baso # 0.05 0.00 - 0.20 K/uL    nRBC 0 0 /100 WBC    Gran% 66.5 38.0 - 73.0 %    Lymph% 24.0 18.0 - 48.0 %    Mono% 7.3 4.0 - 15.0 %    Eosinophil% 1.3 0.0 -  8.0 %    Basophil% 0.6 0.0 - 1.9 %    Differential Method Automated    Comprehensive metabolic panel   Result Value Ref Range    Sodium 139 136 - 145 mmol/L    Potassium 4.0 3.5 - 5.1 mmol/L    Chloride 102 95 - 110 mmol/L    CO2 27 23 - 29 mmol/L    Glucose 300 (H) 70 - 110 mg/dL    BUN, Bld 11 6 - 20 mg/dL    Creatinine 1.3 0.5 - 1.4 mg/dL    Calcium 9.6 8.7 - 10.5 mg/dL    Total Protein 7.7 6.0 - 8.4 g/dL    Albumin 3.9 3.5 - 5.2 g/dL    Total Bilirubin 0.4 0.1 - 1.0 mg/dL    Alkaline Phosphatase 161 (H) 55 - 135 U/L    AST 19 10 - 40 U/L    ALT 22 10 - 44 U/L    Anion Gap 10 8 - 16 mmol/L    eGFR if African American >60 >60 mL/min/1.73 m^2    eGFR if non African American >60 >60 mL/min/1.73 m^2   Troponin I   Result Value Ref Range    Troponin I 0.018 0.000 - 0.026 ng/mL   Lipase   Result Value Ref Range    Lipase 20 4 - 60 U/L       Imaging Results:  Imaging Results          X-Ray Chest AP Portable (Final result)  Result time 10/24/20 17:32:42    Final result by Kenneth Dominguez MD (10/24/20 17:32:42)                 Impression:      No acute abnormality.      Electronically signed by: Kenneth Dominguez  Date:    10/24/2020  Time:    17:32             Narrative:    EXAMINATION:  XR CHEST AP PORTABLE    CLINICAL HISTORY:  CP;    TECHNIQUE:  Single frontal view of the chest was performed.    COMPARISON:  09/27/2019.    FINDINGS:  The lungs are clear, with normal appearance of pulmonary vasculature and no pleural effusion or pneumothorax.    The cardiac silhouette is normal in size. The hilar and mediastinal contours are unremarkable.    Bones are intact.                                 The EKG was ordered, reviewed, and independently interpreted by the ED provider.  Interpretation time: 1803  Rate: 81 BPM  Rhythm: normal sinus rhythm  Interpretation: Nonspecific T wave abnormality. No STEMI.           The Emergency Provider reviewed the vital signs and test results, which are outlined above.     ED Discussion      7:53 PM: Reassessed pt at this time.  Pt states his condition has improved at this time. Discussed with pt all pertinent ED information and results. Discussed pt dx and plan of tx. Gave pt all f/u and return to the ED instructions. All questions and concerns were addressed at this time. Pt expresses understanding of information and instructions, and is comfortable with plan to discharge. Pt is stable for discharge.    I discussed with patient and/or family/caretaker that evaluation in the ED does not suggest any emergent or life threatening medical conditions requiring immediate intervention beyond what was provided in the ED, and I believe patient is safe for discharge.  Regardless, an unremarkable evaluation in the ED does not preclude the development or presence of a serious of life threatening condition. As such, patient was instructed to return immediately for any worsening or change in current symptoms.           Medical Decision Making:   Independently Interpreted Test(s):   I have ordered and independently interpreted X-rays - see summary below.       <> Summary of X-Ray Reading(s): No acute findings  I have ordered and independently interpreted EKG Reading(s) - see prior notes  Clinical Tests:   Lab Tests: Reviewed and Ordered  Radiological Study: Ordered and Reviewed  Medical Tests: Reviewed and Ordered  ED Management:  45-year-old male with history of hypertension and seizures.  Has been noncompliant with medications and has been out of most of them.  Has had chest pain off and on for the past week.  Low suspicion of ACS given story.  Blood pressure responded well to his home medications.  Patient believes he may have had 1 or 2 small seizures between last night and this morning.  Patient did appear fatigued but no focal neurological deficits.  No further seizures or symptoms develop on the emergency department.  Patient was given a dose of Dilantin while here and discharged with prescriptions for all  of his anti epileptic seen antihypertensive.  Patient to return emergency department should symptoms worsen or change in character.  Patient otherwise will with his primary care provider in the next 2-3 days.           ED Medication(s):  Medications   phenytoin (DILANTIN) ER capsule 100 mg (100 mg Oral Given 10/24/20 1720)   sodium chloride 0.9% bolus 1,000 mL (0 mLs Intravenous Stopped 10/24/20 1834)   cloNIDine tablet 0.1 mg (0.1 mg Oral Given 10/24/20 1911)   amLODIPine tablet 10 mg (10 mg Oral Given 10/24/20 1911)   labetaloL tablet 100 mg (100 mg Oral Given 10/24/20 1911)   lisinopriL tablet 10 mg (10 mg Oral Given 10/24/20 1911)       New Prescriptions    No medications on file               Scribe Attestation:   Scribe #1: I performed the above scribed service and the documentation accurately describes the services I performed. I attest to the accuracy of the note.     Attending:   Physician Attestation Statement for Scribe #1: I, Clifford Matthew MD, personally performed the services described in this documentation, as scribed by Tori Barros, in my presence, and it is both accurate and complete.           Clinical Impression       ICD-10-CM ICD-9-CM   1. Chest pain  R07.9 786.50       Disposition:   Disposition: Discharged  Condition: Stable         Clifford Matthew MD  10/25/20 1022

## 2021-03-31 ENCOUNTER — HOSPITAL ENCOUNTER (EMERGENCY)
Facility: HOSPITAL | Age: 46
Discharge: HOME | End: 2021-04-01
Attending: STUDENT IN AN ORGANIZED HEALTH CARE EDUCATION/TRAINING PROGRAM
Payer: COMMERCIAL

## 2021-03-31 DIAGNOSIS — R07.9 CHEST PAIN: ICD-10-CM

## 2021-03-31 DIAGNOSIS — M79.2 NEUROPATHIC PAIN OF UPPER EXTREMITY: Primary | ICD-10-CM

## 2021-03-31 LAB
ALBUMIN SERPL BCP-MCNC: 3.1 G/DL (ref 3.5–5.2)
ALP SERPL-CCNC: 88 U/L (ref 55–135)
ALT SERPL W/O P-5'-P-CCNC: 17 U/L (ref 10–44)
ANION GAP SERPL CALC-SCNC: 9 MMOL/L (ref 8–16)
AST SERPL-CCNC: 16 U/L (ref 10–40)
BASOPHILS # BLD AUTO: 0.04 K/UL (ref 0–0.2)
BASOPHILS NFR BLD: 0.4 % (ref 0–1.9)
BILIRUB SERPL-MCNC: 0.5 MG/DL (ref 0.1–1)
BNP SERPL-MCNC: 106 PG/ML (ref 0–99)
BUN SERPL-MCNC: 15 MG/DL (ref 6–20)
CALCIUM SERPL-MCNC: 8.7 MG/DL (ref 8.7–10.5)
CHLORIDE SERPL-SCNC: 105 MMOL/L (ref 95–110)
CO2 SERPL-SCNC: 25 MMOL/L (ref 23–29)
CREAT SERPL-MCNC: 1.3 MG/DL (ref 0.5–1.4)
DIFFERENTIAL METHOD: ABNORMAL
EOSINOPHIL # BLD AUTO: 0.1 K/UL (ref 0–0.5)
EOSINOPHIL NFR BLD: 0.9 % (ref 0–8)
ERYTHROCYTE [DISTWIDTH] IN BLOOD BY AUTOMATED COUNT: 12.5 % (ref 11.5–14.5)
EST. GFR  (AFRICAN AMERICAN): >60 ML/MIN/1.73 M^2
EST. GFR  (NON AFRICAN AMERICAN): >60 ML/MIN/1.73 M^2
GLUCOSE SERPL-MCNC: 188 MG/DL (ref 70–110)
HCT VFR BLD AUTO: 36.8 % (ref 40–54)
HGB BLD-MCNC: 11.9 G/DL (ref 14–18)
IMM GRANULOCYTES # BLD AUTO: 0.03 K/UL (ref 0–0.04)
IMM GRANULOCYTES NFR BLD AUTO: 0.3 % (ref 0–0.5)
LYMPHOCYTES # BLD AUTO: 1.5 K/UL (ref 1–4.8)
LYMPHOCYTES NFR BLD: 13.8 % (ref 18–48)
MCH RBC QN AUTO: 26.4 PG (ref 27–31)
MCHC RBC AUTO-ENTMCNC: 32.3 G/DL (ref 32–36)
MCV RBC AUTO: 82 FL (ref 82–98)
MONOCYTES # BLD AUTO: 1.3 K/UL (ref 0.3–1)
MONOCYTES NFR BLD: 12.3 % (ref 4–15)
NEUTROPHILS # BLD AUTO: 7.6 K/UL (ref 1.8–7.7)
NEUTROPHILS NFR BLD: 72.3 % (ref 38–73)
NRBC BLD-RTO: 0 /100 WBC
PLATELET # BLD AUTO: 172 K/UL (ref 150–450)
PMV BLD AUTO: 11.7 FL (ref 9.2–12.9)
POTASSIUM SERPL-SCNC: 3.7 MMOL/L (ref 3.5–5.1)
PROT SERPL-MCNC: 6.8 G/DL (ref 6–8.4)
RBC # BLD AUTO: 4.51 M/UL (ref 4.6–6.2)
SODIUM SERPL-SCNC: 139 MMOL/L (ref 136–145)
TROPONIN I SERPL DL<=0.01 NG/ML-MCNC: 0.01 NG/ML (ref 0–0.03)
WBC # BLD AUTO: 10.49 K/UL (ref 3.9–12.7)

## 2021-03-31 PROCEDURE — 84484 ASSAY OF TROPONIN QUANT: CPT | Performed by: STUDENT IN AN ORGANIZED HEALTH CARE EDUCATION/TRAINING PROGRAM

## 2021-03-31 PROCEDURE — 96374 THER/PROPH/DIAG INJ IV PUSH: CPT

## 2021-03-31 PROCEDURE — 85025 COMPLETE CBC W/AUTO DIFF WBC: CPT | Performed by: STUDENT IN AN ORGANIZED HEALTH CARE EDUCATION/TRAINING PROGRAM

## 2021-03-31 PROCEDURE — 93010 ELECTROCARDIOGRAM REPORT: CPT | Mod: ,,, | Performed by: INTERNAL MEDICINE

## 2021-03-31 PROCEDURE — 83880 ASSAY OF NATRIURETIC PEPTIDE: CPT | Performed by: STUDENT IN AN ORGANIZED HEALTH CARE EDUCATION/TRAINING PROGRAM

## 2021-03-31 PROCEDURE — 93005 ELECTROCARDIOGRAM TRACING: CPT

## 2021-03-31 PROCEDURE — 93010 EKG 12-LEAD: ICD-10-PCS | Mod: ,,, | Performed by: INTERNAL MEDICINE

## 2021-03-31 PROCEDURE — 99285 EMERGENCY DEPT VISIT HI MDM: CPT | Mod: 25

## 2021-03-31 PROCEDURE — 80053 COMPREHEN METABOLIC PANEL: CPT | Performed by: STUDENT IN AN ORGANIZED HEALTH CARE EDUCATION/TRAINING PROGRAM

## 2021-03-31 RX ORDER — KETOROLAC TROMETHAMINE 30 MG/ML
15 INJECTION, SOLUTION INTRAMUSCULAR; INTRAVENOUS
Status: COMPLETED | OUTPATIENT
Start: 2021-04-01 | End: 2021-04-01

## 2021-03-31 RX ORDER — ASPIRIN 325 MG
325 TABLET ORAL
Status: COMPLETED | OUTPATIENT
Start: 2021-03-31 | End: 2021-04-01

## 2021-03-31 RX ORDER — GABAPENTIN 300 MG/1
300 CAPSULE ORAL ONCE
Status: COMPLETED | OUTPATIENT
Start: 2021-04-01 | End: 2021-04-01

## 2021-04-01 VITALS
OXYGEN SATURATION: 99 % | BODY MASS INDEX: 30.32 KG/M2 | DIASTOLIC BLOOD PRESSURE: 108 MMHG | RESPIRATION RATE: 17 BRPM | HEIGHT: 72 IN | HEART RATE: 96 BPM | TEMPERATURE: 98 F | SYSTOLIC BLOOD PRESSURE: 187 MMHG

## 2021-04-01 PROCEDURE — 25000003 PHARM REV CODE 250: Performed by: STUDENT IN AN ORGANIZED HEALTH CARE EDUCATION/TRAINING PROGRAM

## 2021-04-01 PROCEDURE — 63600175 PHARM REV CODE 636 W HCPCS: Performed by: STUDENT IN AN ORGANIZED HEALTH CARE EDUCATION/TRAINING PROGRAM

## 2021-04-01 RX ORDER — OLMESARTAN MEDOXOMIL, AMLODIPINE AND HYDROCHLOROTHIAZIDE TABLET 40/10/25 MG 40; 10; 25 MG/1; MG/1; MG/1
1 TABLET ORAL DAILY
Qty: 30 TABLET | Refills: 11 | Status: ON HOLD | OUTPATIENT
Start: 2021-04-01 | End: 2021-12-01 | Stop reason: HOSPADM

## 2021-04-01 RX ORDER — GABAPENTIN 300 MG/1
300 CAPSULE ORAL 3 TIMES DAILY PRN
Qty: 42 CAPSULE | Refills: 0 | Status: SHIPPED | OUTPATIENT
Start: 2021-04-01 | End: 2021-04-15

## 2021-04-01 RX ADMIN — GABAPENTIN 300 MG: 300 CAPSULE ORAL at 01:04

## 2021-04-01 RX ADMIN — KETOROLAC TROMETHAMINE 15 MG: 30 INJECTION, SOLUTION INTRAMUSCULAR; INTRAVENOUS at 01:04

## 2021-04-01 RX ADMIN — ASPIRIN 325 MG ORAL TABLET 325 MG: 325 PILL ORAL at 01:04

## 2021-11-27 ENCOUNTER — HOSPITAL ENCOUNTER (INPATIENT)
Facility: HOSPITAL | Age: 46
LOS: 4 days | Discharge: HOME HEALTH | DRG: 291 | End: 2021-12-01
Attending: EMERGENCY MEDICINE | Admitting: INTERNAL MEDICINE
Payer: COMMERCIAL

## 2021-11-27 DIAGNOSIS — Z86.73 HISTORY OF STROKE: ICD-10-CM

## 2021-11-27 DIAGNOSIS — I50.41 ACUTE COMBINED SYSTOLIC AND DIASTOLIC CONGESTIVE HEART FAILURE: ICD-10-CM

## 2021-11-27 DIAGNOSIS — I15.2 HYPERTENSION ASSOCIATED WITH DIABETES: ICD-10-CM

## 2021-11-27 DIAGNOSIS — E83.42 HYPOMAGNESEMIA: ICD-10-CM

## 2021-11-27 DIAGNOSIS — R79.89 ELEVATED TROPONIN: ICD-10-CM

## 2021-11-27 DIAGNOSIS — E11.59 HYPERTENSION ASSOCIATED WITH DIABETES: ICD-10-CM

## 2021-11-27 DIAGNOSIS — E11.9 TYPE 2 DIABETES MELLITUS WITHOUT COMPLICATION, UNSPECIFIED WHETHER LONG TERM INSULIN USE: ICD-10-CM

## 2021-11-27 DIAGNOSIS — G40.909 SEIZURE DISORDER: ICD-10-CM

## 2021-11-27 DIAGNOSIS — I50.9 ACUTE CONGESTIVE HEART FAILURE: ICD-10-CM

## 2021-11-27 DIAGNOSIS — I50.9 CHF (CONGESTIVE HEART FAILURE): Primary | ICD-10-CM

## 2021-11-27 DIAGNOSIS — I10 HYPERTENSION, UNSPECIFIED TYPE: ICD-10-CM

## 2021-11-27 DIAGNOSIS — R06.02 SOB (SHORTNESS OF BREATH): ICD-10-CM

## 2021-11-27 DIAGNOSIS — I50.21 ACUTE SYSTOLIC CONGESTIVE HEART FAILURE: ICD-10-CM

## 2021-11-27 PROBLEM — R19.7 DIARRHEA: Status: ACTIVE | Noted: 2021-11-27

## 2021-11-27 PROBLEM — N17.9 AKI (ACUTE KIDNEY INJURY): Status: ACTIVE | Noted: 2021-11-27

## 2021-11-27 LAB
ALBUMIN SERPL BCP-MCNC: 3.2 G/DL (ref 3.5–5.2)
ALP SERPL-CCNC: 87 U/L (ref 55–135)
ALT SERPL W/O P-5'-P-CCNC: 34 U/L (ref 10–44)
ANION GAP SERPL CALC-SCNC: 12 MMOL/L (ref 8–16)
AORTIC ROOT ANNULUS: 3.45 CM
APTT BLDCRRT: 25.5 SEC (ref 21–32)
ASCENDING AORTA: 3.52 CM
AST SERPL-CCNC: 21 U/L (ref 10–40)
AV INDEX (PROSTH): 0.85
AV MEAN GRADIENT: 5 MMHG
AV PEAK GRADIENT: 7 MMHG
AV VALVE AREA: 2.9 CM2
AV VELOCITY RATIO: 0.7
BASOPHILS # BLD AUTO: 0.06 K/UL (ref 0–0.2)
BASOPHILS NFR BLD: 0.7 % (ref 0–1.9)
BILIRUB SERPL-MCNC: 0.7 MG/DL (ref 0.1–1)
BNP SERPL-MCNC: 1803 PG/ML (ref 0–99)
BSA FOR ECHO PROCEDURE: 2.42 M2
BUN SERPL-MCNC: 18 MG/DL (ref 6–20)
CALCIUM SERPL-MCNC: 8.6 MG/DL (ref 8.7–10.5)
CHLORIDE SERPL-SCNC: 109 MMOL/L (ref 95–110)
CO2 SERPL-SCNC: 24 MMOL/L (ref 23–29)
CREAT SERPL-MCNC: 1.5 MG/DL (ref 0.5–1.4)
CTP QC/QA: YES
CV ECHO LV RWT: 0.61 CM
D DIMER PPP IA.FEU-MCNC: 2.86 MG/L FEU
DIFFERENTIAL METHOD: ABNORMAL
DOP CALC AO PEAK VEL: 1.33 M/S
DOP CALC AO VTI: 21.8 CM
DOP CALC LVOT AREA: 3.4 CM2
DOP CALC LVOT DIAMETER: 2.08 CM
DOP CALC LVOT PEAK VEL: 0.93 M/S
DOP CALC LVOT STROKE VOLUME: 63.17 CM3
DOP CALC RVOT PEAK VEL: 0.71 M/S
DOP CALC RVOT VTI: 10.8 CM
DOP CALCLVOT PEAK VEL VTI: 18.6 CM
E WAVE DECELERATION TIME: 169.28 MSEC
E/A RATIO: 1.52
E/E' RATIO: 10.63 M/S
ECHO EF ESTIMATED: 21 %
ECHO LV POSTERIOR WALL: 1.57 CM (ref 0.6–1.1)
EJECTION FRACTION: 20 %
EOSINOPHIL # BLD AUTO: 0.2 K/UL (ref 0–0.5)
EOSINOPHIL NFR BLD: 2 % (ref 0–8)
ERYTHROCYTE [DISTWIDTH] IN BLOOD BY AUTOMATED COUNT: 13.2 % (ref 11.5–14.5)
EST. GFR  (AFRICAN AMERICAN): >60 ML/MIN/1.73 M^2
EST. GFR  (NON AFRICAN AMERICAN): 54 ML/MIN/1.73 M^2
FRACTIONAL SHORTENING: 9 % (ref 28–44)
GLUCOSE SERPL-MCNC: 143 MG/DL (ref 70–110)
HCT VFR BLD AUTO: 42.8 % (ref 40–54)
HGB BLD-MCNC: 13.5 G/DL (ref 14–18)
IMM GRANULOCYTES # BLD AUTO: 0.01 K/UL (ref 0–0.04)
IMM GRANULOCYTES NFR BLD AUTO: 0.1 % (ref 0–0.5)
INR PPP: 1 (ref 0.8–1.2)
INTERVENTRICULAR SEPTUM: 1.76 CM (ref 0.6–1.1)
IVC DIAMETER: 2.18 CM
IVRT: 58.52 MSEC
LA MAJOR: 5.91 CM
LA MINOR: 5.31 CM
LEFT ATRIUM SIZE: 5.35 CM
LEFT INTERNAL DIMENSION IN SYSTOLE: 4.68 CM (ref 2.1–4)
LEFT VENTRICLE DIASTOLIC VOLUME INDEX: 54.19 ML/M2
LEFT VENTRICLE DIASTOLIC VOLUME: 127.89 ML
LEFT VENTRICLE MASS INDEX: 168 G/M2
LEFT VENTRICLE SYSTOLIC VOLUME INDEX: 43 ML/M2
LEFT VENTRICLE SYSTOLIC VOLUME: 101.49 ML
LEFT VENTRICULAR INTERNAL DIMENSION IN DIASTOLE: 5.17 CM (ref 3.5–6)
LEFT VENTRICULAR MASS: 396.58 G
LV LATERAL E/E' RATIO: 8.5 M/S
LV SEPTAL E/E' RATIO: 14.17 M/S
LVOT MG: 2.89 MMHG
LVOT MV: 0.85 CM/S
LYMPHOCYTES # BLD AUTO: 2.5 K/UL (ref 1–4.8)
LYMPHOCYTES NFR BLD: 27.7 % (ref 18–48)
MAGNESIUM SERPL-MCNC: 1.6 MG/DL (ref 1.6–2.6)
MCH RBC QN AUTO: 25.6 PG (ref 27–31)
MCHC RBC AUTO-ENTMCNC: 31.5 G/DL (ref 32–36)
MCV RBC AUTO: 81 FL (ref 82–98)
MONOCYTES # BLD AUTO: 0.8 K/UL (ref 0.3–1)
MONOCYTES NFR BLD: 8.9 % (ref 4–15)
MV PEAK A VEL: 0.56 M/S
MV PEAK E VEL: 0.85 M/S
MV STENOSIS PRESSURE HALF TIME: 49.09 MS
MV VALVE AREA P 1/2 METHOD: 4.48 CM2
NEUTROPHILS # BLD AUTO: 5.5 K/UL (ref 1.8–7.7)
NEUTROPHILS NFR BLD: 60.6 % (ref 38–73)
NRBC BLD-RTO: 0 /100 WBC
PHENYTOIN SERPL-MCNC: <0.1 UG/ML (ref 10–20)
PISA MRMAX VEL: 3.43 M/S
PISA TR MAX VEL: 2.67 M/S
PLATELET # BLD AUTO: 244 K/UL (ref 150–450)
PMV BLD AUTO: 12.3 FL (ref 9.2–12.9)
POCT GLUCOSE: 140 MG/DL (ref 70–110)
POCT GLUCOSE: 144 MG/DL (ref 70–110)
POCT GLUCOSE: 183 MG/DL (ref 70–110)
POTASSIUM SERPL-SCNC: 3.5 MMOL/L (ref 3.5–5.1)
PROT SERPL-MCNC: 6.1 G/DL (ref 6–8.4)
PROTHROMBIN TIME: 11.4 SEC (ref 9–12.5)
PV MEAN GRADIENT: 1.34 MMHG
RA MAJOR: 4.5 CM
RA PRESSURE: 15 MMHG
RA WIDTH: 4.28 CM
RBC # BLD AUTO: 5.28 M/UL (ref 4.6–6.2)
RIGHT VENTRICULAR END-DIASTOLIC DIMENSION: 2.98 CM
SARS-COV-2 RDRP RESP QL NAA+PROBE: NEGATIVE
SINUS: 3.9 CM
SODIUM SERPL-SCNC: 145 MMOL/L (ref 136–145)
STJ: 3.62 CM
TDI LATERAL: 0.1 M/S
TDI SEPTAL: 0.06 M/S
TDI: 0.08 M/S
TR MAX PG: 29 MMHG
TR MEAN GRADIENT: 23 MMHG
TRICUSPID ANNULAR PLANE SYSTOLIC EXCURSION: 1.43 CM
TROPONIN I SERPL DL<=0.01 NG/ML-MCNC: 0.04 NG/ML (ref 0–0.03)
TROPONIN I SERPL DL<=0.01 NG/ML-MCNC: 0.04 NG/ML (ref 0–0.03)
TROPONIN I SERPL DL<=0.01 NG/ML-MCNC: 0.05 NG/ML (ref 0–0.03)
TV REST PULMONARY ARTERY PRESSURE: 44 MMHG
WBC # BLD AUTO: 9.01 K/UL (ref 3.9–12.7)

## 2021-11-27 PROCEDURE — 25000003 PHARM REV CODE 250: Performed by: EMERGENCY MEDICINE

## 2021-11-27 PROCEDURE — 93010 ELECTROCARDIOGRAM REPORT: CPT | Mod: ,,, | Performed by: STUDENT IN AN ORGANIZED HEALTH CARE EDUCATION/TRAINING PROGRAM

## 2021-11-27 PROCEDURE — 27000207 HC ISOLATION

## 2021-11-27 PROCEDURE — 96374 THER/PROPH/DIAG INJ IV PUSH: CPT

## 2021-11-27 PROCEDURE — 83880 ASSAY OF NATRIURETIC PEPTIDE: CPT | Performed by: EMERGENCY MEDICINE

## 2021-11-27 PROCEDURE — 85730 THROMBOPLASTIN TIME PARTIAL: CPT | Performed by: EMERGENCY MEDICINE

## 2021-11-27 PROCEDURE — 25000003 PHARM REV CODE 250: Performed by: INTERNAL MEDICINE

## 2021-11-27 PROCEDURE — U0002 COVID-19 LAB TEST NON-CDC: HCPCS | Performed by: EMERGENCY MEDICINE

## 2021-11-27 PROCEDURE — 99223 1ST HOSP IP/OBS HIGH 75: CPT | Mod: ,,, | Performed by: STUDENT IN AN ORGANIZED HEALTH CARE EDUCATION/TRAINING PROGRAM

## 2021-11-27 PROCEDURE — 36415 COLL VENOUS BLD VENIPUNCTURE: CPT | Performed by: INTERNAL MEDICINE

## 2021-11-27 PROCEDURE — 94761 N-INVAS EAR/PLS OXIMETRY MLT: CPT

## 2021-11-27 PROCEDURE — 63600175 PHARM REV CODE 636 W HCPCS: Performed by: NURSE PRACTITIONER

## 2021-11-27 PROCEDURE — 96372 THER/PROPH/DIAG INJ SC/IM: CPT | Mod: 59

## 2021-11-27 PROCEDURE — 84484 ASSAY OF TROPONIN QUANT: CPT | Performed by: EMERGENCY MEDICINE

## 2021-11-27 PROCEDURE — 85379 FIBRIN DEGRADATION QUANT: CPT | Performed by: INTERNAL MEDICINE

## 2021-11-27 PROCEDURE — 63600175 PHARM REV CODE 636 W HCPCS: Performed by: EMERGENCY MEDICINE

## 2021-11-27 PROCEDURE — 25000003 PHARM REV CODE 250: Performed by: NURSE PRACTITIONER

## 2021-11-27 PROCEDURE — 99291 CRITICAL CARE FIRST HOUR: CPT | Mod: 25

## 2021-11-27 PROCEDURE — 80185 ASSAY OF PHENYTOIN TOTAL: CPT | Performed by: NURSE PRACTITIONER

## 2021-11-27 PROCEDURE — 80053 COMPREHEN METABOLIC PANEL: CPT | Performed by: EMERGENCY MEDICINE

## 2021-11-27 PROCEDURE — 93005 ELECTROCARDIOGRAM TRACING: CPT

## 2021-11-27 PROCEDURE — 85025 COMPLETE CBC W/AUTO DIFF WBC: CPT | Performed by: EMERGENCY MEDICINE

## 2021-11-27 PROCEDURE — 83735 ASSAY OF MAGNESIUM: CPT | Performed by: NURSE PRACTITIONER

## 2021-11-27 PROCEDURE — 36415 COLL VENOUS BLD VENIPUNCTURE: CPT | Performed by: NURSE PRACTITIONER

## 2021-11-27 PROCEDURE — 11000001 HC ACUTE MED/SURG PRIVATE ROOM

## 2021-11-27 PROCEDURE — 85610 PROTHROMBIN TIME: CPT | Performed by: EMERGENCY MEDICINE

## 2021-11-27 PROCEDURE — 99900037 HC PT THERAPY SCREENING (STAT)

## 2021-11-27 PROCEDURE — 99223 PR INITIAL HOSPITAL CARE,LEVL III: ICD-10-PCS | Mod: ,,, | Performed by: STUDENT IN AN ORGANIZED HEALTH CARE EDUCATION/TRAINING PROGRAM

## 2021-11-27 PROCEDURE — 25000003 PHARM REV CODE 250: Performed by: STUDENT IN AN ORGANIZED HEALTH CARE EDUCATION/TRAINING PROGRAM

## 2021-11-27 PROCEDURE — 25500020 PHARM REV CODE 255: Performed by: INTERNAL MEDICINE

## 2021-11-27 PROCEDURE — 84484 ASSAY OF TROPONIN QUANT: CPT | Mod: 91 | Performed by: NURSE PRACTITIONER

## 2021-11-27 PROCEDURE — 93010 EKG 12-LEAD: ICD-10-PCS | Mod: ,,, | Performed by: STUDENT IN AN ORGANIZED HEALTH CARE EDUCATION/TRAINING PROGRAM

## 2021-11-27 RX ORDER — ENOXAPARIN SODIUM 100 MG/ML
75 INJECTION SUBCUTANEOUS ONCE
Status: COMPLETED | OUTPATIENT
Start: 2021-11-27 | End: 2021-11-27

## 2021-11-27 RX ORDER — ISOSORBIDE DINITRATE 10 MG/1
10 TABLET ORAL 3 TIMES DAILY
Status: DISCONTINUED | OUTPATIENT
Start: 2021-11-27 | End: 2021-12-01 | Stop reason: HOSPADM

## 2021-11-27 RX ORDER — HYDRALAZINE HYDROCHLORIDE 10 MG/1
10 TABLET, FILM COATED ORAL EVERY 8 HOURS
Status: DISCONTINUED | OUTPATIENT
Start: 2021-11-27 | End: 2021-12-01 | Stop reason: HOSPADM

## 2021-11-27 RX ORDER — ENOXAPARIN SODIUM 150 MG/ML
1 INJECTION SUBCUTANEOUS
Status: DISCONTINUED | OUTPATIENT
Start: 2021-11-28 | End: 2021-11-28

## 2021-11-27 RX ORDER — BENZONATATE 100 MG/1
100 CAPSULE ORAL 3 TIMES DAILY PRN
Status: DISCONTINUED | OUTPATIENT
Start: 2021-11-27 | End: 2021-12-01 | Stop reason: HOSPADM

## 2021-11-27 RX ORDER — CLONIDINE HYDROCHLORIDE 0.1 MG/1
0.1 TABLET ORAL 2 TIMES DAILY
Status: DISCONTINUED | OUTPATIENT
Start: 2021-11-27 | End: 2021-11-30

## 2021-11-27 RX ORDER — DOXEPIN HYDROCHLORIDE 25 MG/1
25 CAPSULE ORAL NIGHTLY
Status: DISCONTINUED | OUTPATIENT
Start: 2021-11-27 | End: 2021-12-01 | Stop reason: HOSPADM

## 2021-11-27 RX ORDER — HYDROCHLOROTHIAZIDE 25 MG/1
25 TABLET ORAL DAILY
Status: DISCONTINUED | OUTPATIENT
Start: 2021-11-27 | End: 2021-11-27

## 2021-11-27 RX ORDER — ASPIRIN 325 MG
325 TABLET ORAL ONCE
Status: COMPLETED | OUTPATIENT
Start: 2021-11-27 | End: 2021-11-27

## 2021-11-27 RX ORDER — HYDRALAZINE HYDROCHLORIDE 20 MG/ML
5 INJECTION INTRAMUSCULAR; INTRAVENOUS EVERY 4 HOURS PRN
Status: DISCONTINUED | OUTPATIENT
Start: 2021-11-27 | End: 2021-12-01 | Stop reason: HOSPADM

## 2021-11-27 RX ORDER — SODIUM CHLORIDE 0.9 % (FLUSH) 0.9 %
10 SYRINGE (ML) INJECTION
Status: DISCONTINUED | OUTPATIENT
Start: 2021-11-27 | End: 2021-12-01 | Stop reason: HOSPADM

## 2021-11-27 RX ORDER — LOSARTAN POTASSIUM 25 MG/1
25 TABLET ORAL DAILY
Status: DISCONTINUED | OUTPATIENT
Start: 2021-11-27 | End: 2021-11-29

## 2021-11-27 RX ORDER — OLMESARTAN MEDOXOMIL, AMLODIPINE AND HYDROCHLOROTHIAZIDE TABLET 40/10/25 MG 40; 10; 25 MG/1; MG/1; MG/1
1 TABLET ORAL DAILY
Status: DISCONTINUED | OUTPATIENT
Start: 2021-11-27 | End: 2021-11-27

## 2021-11-27 RX ORDER — ENOXAPARIN SODIUM 100 MG/ML
40 INJECTION SUBCUTANEOUS EVERY 24 HOURS
Status: DISCONTINUED | OUTPATIENT
Start: 2021-11-27 | End: 2021-11-27

## 2021-11-27 RX ORDER — GABAPENTIN 300 MG/1
300 CAPSULE ORAL 3 TIMES DAILY PRN
Status: DISCONTINUED | OUTPATIENT
Start: 2021-11-27 | End: 2021-12-01 | Stop reason: HOSPADM

## 2021-11-27 RX ORDER — ATORVASTATIN CALCIUM 10 MG/1
10 TABLET, FILM COATED ORAL NIGHTLY
Status: DISCONTINUED | OUTPATIENT
Start: 2021-11-27 | End: 2021-12-01 | Stop reason: HOSPADM

## 2021-11-27 RX ORDER — IBUPROFEN 200 MG
24 TABLET ORAL
Status: DISCONTINUED | OUTPATIENT
Start: 2021-11-27 | End: 2021-12-01 | Stop reason: HOSPADM

## 2021-11-27 RX ORDER — ONDANSETRON 2 MG/ML
4 INJECTION INTRAMUSCULAR; INTRAVENOUS EVERY 4 HOURS PRN
Status: DISCONTINUED | OUTPATIENT
Start: 2021-11-27 | End: 2021-12-01 | Stop reason: HOSPADM

## 2021-11-27 RX ORDER — AMLODIPINE BESYLATE 10 MG/1
10 TABLET ORAL DAILY
Status: DISCONTINUED | OUTPATIENT
Start: 2021-11-27 | End: 2021-12-01

## 2021-11-27 RX ORDER — SODIUM CHLORIDE 9 MG/ML
INJECTION, SOLUTION INTRAVENOUS
Status: DISCONTINUED | OUTPATIENT
Start: 2021-11-27 | End: 2021-12-01 | Stop reason: HOSPADM

## 2021-11-27 RX ORDER — PHENYTOIN SODIUM 100 MG/1
200 CAPSULE, EXTENDED RELEASE ORAL 3 TIMES DAILY
Status: DISCONTINUED | OUTPATIENT
Start: 2021-11-27 | End: 2021-12-01 | Stop reason: HOSPADM

## 2021-11-27 RX ORDER — MAGNESIUM SULFATE HEPTAHYDRATE 40 MG/ML
2 INJECTION, SOLUTION INTRAVENOUS ONCE
Status: COMPLETED | OUTPATIENT
Start: 2021-11-27 | End: 2021-11-27

## 2021-11-27 RX ORDER — INSULIN ASPART 100 [IU]/ML
1-10 INJECTION, SOLUTION INTRAVENOUS; SUBCUTANEOUS
Status: DISCONTINUED | OUTPATIENT
Start: 2021-11-27 | End: 2021-12-01 | Stop reason: HOSPADM

## 2021-11-27 RX ORDER — CLONIDINE HYDROCHLORIDE 0.1 MG/1
0.1 TABLET ORAL
Status: COMPLETED | OUTPATIENT
Start: 2021-11-27 | End: 2021-11-27

## 2021-11-27 RX ORDER — GLUCAGON 1 MG
1 KIT INJECTION
Status: DISCONTINUED | OUTPATIENT
Start: 2021-11-27 | End: 2021-12-01 | Stop reason: HOSPADM

## 2021-11-27 RX ORDER — POTASSIUM CHLORIDE 20 MEQ/1
20 TABLET, EXTENDED RELEASE ORAL ONCE
Status: COMPLETED | OUTPATIENT
Start: 2021-11-27 | End: 2021-11-27

## 2021-11-27 RX ORDER — IBUPROFEN 200 MG
16 TABLET ORAL
Status: DISCONTINUED | OUTPATIENT
Start: 2021-11-27 | End: 2021-12-01 | Stop reason: HOSPADM

## 2021-11-27 RX ORDER — ACETAMINOPHEN 325 MG/1
650 TABLET ORAL EVERY 6 HOURS PRN
Status: DISCONTINUED | OUTPATIENT
Start: 2021-11-27 | End: 2021-12-01 | Stop reason: HOSPADM

## 2021-11-27 RX ORDER — FUROSEMIDE 10 MG/ML
40 INJECTION INTRAMUSCULAR; INTRAVENOUS
Status: COMPLETED | OUTPATIENT
Start: 2021-11-27 | End: 2021-11-27

## 2021-11-27 RX ORDER — FUROSEMIDE 10 MG/ML
40 INJECTION INTRAMUSCULAR; INTRAVENOUS 2 TIMES DAILY
Status: DISCONTINUED | OUTPATIENT
Start: 2021-11-27 | End: 2021-12-01 | Stop reason: HOSPADM

## 2021-11-27 RX ADMIN — AMLODIPINE BESYLATE 10 MG: 10 TABLET ORAL at 09:11

## 2021-11-27 RX ADMIN — LOSARTAN POTASSIUM 25 MG: 25 TABLET, FILM COATED ORAL at 09:11

## 2021-11-27 RX ADMIN — SODIUM CHLORIDE: 0.9 INJECTION, SOLUTION INTRAVENOUS at 01:11

## 2021-11-27 RX ADMIN — MAGNESIUM SULFATE 2 G: 2 INJECTION INTRAVENOUS at 01:11

## 2021-11-27 RX ADMIN — CLONIDINE HYDROCHLORIDE 0.1 MG: 0.1 TABLET ORAL at 06:11

## 2021-11-27 RX ADMIN — PHENYTOIN SODIUM 200 MG: 100 CAPSULE ORAL at 03:11

## 2021-11-27 RX ADMIN — NITROGLYCERIN 2 INCH: 20 OINTMENT TOPICAL at 05:11

## 2021-11-27 RX ADMIN — CLONIDINE HYDROCHLORIDE 0.1 MG: 0.1 TABLET ORAL at 09:11

## 2021-11-27 RX ADMIN — ASPIRIN 325 MG ORAL TABLET 325 MG: 325 PILL ORAL at 12:11

## 2021-11-27 RX ADMIN — HYDRALAZINE HYDROCHLORIDE 10 MG: 10 TABLET, FILM COATED ORAL at 09:11

## 2021-11-27 RX ADMIN — FUROSEMIDE 40 MG: 10 INJECTION, SOLUTION INTRAVENOUS at 05:11

## 2021-11-27 RX ADMIN — ISOSORBIDE DINITRATE 10 MG: 10 TABLET ORAL at 03:11

## 2021-11-27 RX ADMIN — HYDROCHLOROTHIAZIDE 25 MG: 25 TABLET ORAL at 09:11

## 2021-11-27 RX ADMIN — ATORVASTATIN CALCIUM 10 MG: 10 TABLET, FILM COATED ORAL at 09:11

## 2021-11-27 RX ADMIN — PHENYTOIN SODIUM 200 MG: 100 CAPSULE ORAL at 09:11

## 2021-11-27 RX ADMIN — FUROSEMIDE 40 MG: 40 INJECTION, SOLUTION INTRAMUSCULAR; INTRAVENOUS at 09:11

## 2021-11-27 RX ADMIN — HYDRALAZINE HYDROCHLORIDE 10 MG: 10 TABLET, FILM COATED ORAL at 03:11

## 2021-11-27 RX ADMIN — ENOXAPARIN SODIUM 40 MG: 40 INJECTION SUBCUTANEOUS at 05:11

## 2021-11-27 RX ADMIN — BENZONATATE 100 MG: 100 CAPSULE ORAL at 11:11

## 2021-11-27 RX ADMIN — DOXEPIN HYDROCHLORIDE 25 MG: 25 CAPSULE ORAL at 09:11

## 2021-11-27 RX ADMIN — INSULIN ASPART 1 UNITS: 100 INJECTION, SOLUTION INTRAVENOUS; SUBCUTANEOUS at 09:11

## 2021-11-27 RX ADMIN — ISOSORBIDE DINITRATE 10 MG: 10 TABLET ORAL at 09:11

## 2021-11-27 RX ADMIN — IOHEXOL 100 ML: 350 INJECTION, SOLUTION INTRAVENOUS at 10:11

## 2021-11-27 RX ADMIN — POTASSIUM CHLORIDE 20 MEQ: 1500 TABLET, EXTENDED RELEASE ORAL at 09:11

## 2021-11-27 RX ADMIN — ENOXAPARIN SODIUM 80 MG: 100 INJECTION SUBCUTANEOUS at 07:11

## 2021-11-28 PROBLEM — G83.9 PARALYSIS: Status: ACTIVE | Noted: 2021-04-08

## 2021-11-28 LAB
ALBUMIN SERPL BCP-MCNC: 2.7 G/DL (ref 3.5–5.2)
ALP SERPL-CCNC: 72 U/L (ref 55–135)
ALT SERPL W/O P-5'-P-CCNC: 22 U/L (ref 10–44)
ANION GAP SERPL CALC-SCNC: 8 MMOL/L (ref 8–16)
ANION GAP SERPL CALC-SCNC: 8 MMOL/L (ref 8–16)
AST SERPL-CCNC: 16 U/L (ref 10–40)
BASOPHILS # BLD AUTO: 0.09 K/UL (ref 0–0.2)
BASOPHILS NFR BLD: 1.5 % (ref 0–1.9)
BILIRUB SERPL-MCNC: 0.5 MG/DL (ref 0.1–1)
BUN SERPL-MCNC: 15 MG/DL (ref 6–20)
BUN SERPL-MCNC: 15 MG/DL (ref 6–20)
CALCIUM SERPL-MCNC: 8.1 MG/DL (ref 8.7–10.5)
CALCIUM SERPL-MCNC: 8.1 MG/DL (ref 8.7–10.5)
CHLORIDE SERPL-SCNC: 107 MMOL/L (ref 95–110)
CHLORIDE SERPL-SCNC: 107 MMOL/L (ref 95–110)
CHOLEST SERPL-MCNC: 144 MG/DL (ref 120–199)
CHOLEST/HDLC SERPL: 5 {RATIO} (ref 2–5)
CO2 SERPL-SCNC: 27 MMOL/L (ref 23–29)
CO2 SERPL-SCNC: 27 MMOL/L (ref 23–29)
CREAT SERPL-MCNC: 1.6 MG/DL (ref 0.5–1.4)
CREAT SERPL-MCNC: 1.6 MG/DL (ref 0.5–1.4)
DIFFERENTIAL METHOD: ABNORMAL
EOSINOPHIL # BLD AUTO: 0.3 K/UL (ref 0–0.5)
EOSINOPHIL NFR BLD: 4.1 % (ref 0–8)
ERYTHROCYTE [DISTWIDTH] IN BLOOD BY AUTOMATED COUNT: 13.2 % (ref 11.5–14.5)
EST. GFR  (AFRICAN AMERICAN): 58 ML/MIN/1.73 M^2
EST. GFR  (AFRICAN AMERICAN): 58 ML/MIN/1.73 M^2
EST. GFR  (NON AFRICAN AMERICAN): 50 ML/MIN/1.73 M^2
EST. GFR  (NON AFRICAN AMERICAN): 50 ML/MIN/1.73 M^2
ESTIMATED AVG GLUCOSE: 163 MG/DL (ref 68–131)
GLUCOSE SERPL-MCNC: 105 MG/DL (ref 70–110)
GLUCOSE SERPL-MCNC: 105 MG/DL (ref 70–110)
HBA1C MFR BLD: 7.3 % (ref 4–5.6)
HCT VFR BLD AUTO: 35.5 % (ref 40–54)
HDLC SERPL-MCNC: 29 MG/DL (ref 40–75)
HDLC SERPL: 20.1 % (ref 20–50)
HGB BLD-MCNC: 10.9 G/DL (ref 14–18)
IMM GRANULOCYTES # BLD AUTO: 0.02 K/UL (ref 0–0.04)
IMM GRANULOCYTES NFR BLD AUTO: 0.3 % (ref 0–0.5)
LDLC SERPL CALC-MCNC: 100.8 MG/DL (ref 63–159)
LYMPHOCYTES # BLD AUTO: 1.5 K/UL (ref 1–4.8)
LYMPHOCYTES NFR BLD: 23.7 % (ref 18–48)
MAGNESIUM SERPL-MCNC: 1.7 MG/DL (ref 1.6–2.6)
MCH RBC QN AUTO: 25.8 PG (ref 27–31)
MCHC RBC AUTO-ENTMCNC: 30.7 G/DL (ref 32–36)
MCV RBC AUTO: 84 FL (ref 82–98)
MONOCYTES # BLD AUTO: 0.7 K/UL (ref 0.3–1)
MONOCYTES NFR BLD: 11.1 % (ref 4–15)
NEUTROPHILS # BLD AUTO: 3.6 K/UL (ref 1.8–7.7)
NEUTROPHILS NFR BLD: 59.3 % (ref 38–73)
NONHDLC SERPL-MCNC: 115 MG/DL
NRBC BLD-RTO: 0 /100 WBC
PLATELET # BLD AUTO: 189 K/UL (ref 150–450)
PMV BLD AUTO: 12.6 FL (ref 9.2–12.9)
POCT GLUCOSE: 104 MG/DL (ref 70–110)
POCT GLUCOSE: 110 MG/DL (ref 70–110)
POCT GLUCOSE: 112 MG/DL (ref 70–110)
POTASSIUM SERPL-SCNC: 3.2 MMOL/L (ref 3.5–5.1)
POTASSIUM SERPL-SCNC: 3.2 MMOL/L (ref 3.5–5.1)
PROT SERPL-MCNC: 5.3 G/DL (ref 6–8.4)
RBC # BLD AUTO: 4.22 M/UL (ref 4.6–6.2)
SODIUM SERPL-SCNC: 142 MMOL/L (ref 136–145)
SODIUM SERPL-SCNC: 142 MMOL/L (ref 136–145)
TRIGL SERPL-MCNC: 71 MG/DL (ref 30–150)
TSH SERPL DL<=0.005 MIU/L-ACNC: 0.91 UIU/ML (ref 0.4–4)
WBC # BLD AUTO: 6.12 K/UL (ref 3.9–12.7)

## 2021-11-28 PROCEDURE — 94799 UNLISTED PULMONARY SVC/PX: CPT

## 2021-11-28 PROCEDURE — 94761 N-INVAS EAR/PLS OXIMETRY MLT: CPT

## 2021-11-28 PROCEDURE — 99232 PR SUBSEQUENT HOSPITAL CARE,LEVL II: ICD-10-PCS | Mod: ,,, | Performed by: STUDENT IN AN ORGANIZED HEALTH CARE EDUCATION/TRAINING PROGRAM

## 2021-11-28 PROCEDURE — 25000003 PHARM REV CODE 250: Performed by: NURSE PRACTITIONER

## 2021-11-28 PROCEDURE — 36415 COLL VENOUS BLD VENIPUNCTURE: CPT | Performed by: NURSE PRACTITIONER

## 2021-11-28 PROCEDURE — 63600175 PHARM REV CODE 636 W HCPCS: Performed by: NURSE PRACTITIONER

## 2021-11-28 PROCEDURE — 84443 ASSAY THYROID STIM HORMONE: CPT | Performed by: NURSE PRACTITIONER

## 2021-11-28 PROCEDURE — 85025 COMPLETE CBC W/AUTO DIFF WBC: CPT | Performed by: NURSE PRACTITIONER

## 2021-11-28 PROCEDURE — 97162 PT EVAL MOD COMPLEX 30 MIN: CPT

## 2021-11-28 PROCEDURE — 99232 SBSQ HOSP IP/OBS MODERATE 35: CPT | Mod: ,,, | Performed by: STUDENT IN AN ORGANIZED HEALTH CARE EDUCATION/TRAINING PROGRAM

## 2021-11-28 PROCEDURE — 97530 THERAPEUTIC ACTIVITIES: CPT

## 2021-11-28 PROCEDURE — 80053 COMPREHEN METABOLIC PANEL: CPT | Performed by: NURSE PRACTITIONER

## 2021-11-28 PROCEDURE — 27000221 HC OXYGEN, UP TO 24 HOURS

## 2021-11-28 PROCEDURE — 83735 ASSAY OF MAGNESIUM: CPT | Performed by: NURSE PRACTITIONER

## 2021-11-28 PROCEDURE — 97166 OT EVAL MOD COMPLEX 45 MIN: CPT

## 2021-11-28 PROCEDURE — 99900035 HC TECH TIME PER 15 MIN (STAT)

## 2021-11-28 PROCEDURE — 83036 HEMOGLOBIN GLYCOSYLATED A1C: CPT | Performed by: NURSE PRACTITIONER

## 2021-11-28 PROCEDURE — 11000001 HC ACUTE MED/SURG PRIVATE ROOM

## 2021-11-28 PROCEDURE — 80061 LIPID PANEL: CPT | Performed by: NURSE PRACTITIONER

## 2021-11-28 PROCEDURE — 25000003 PHARM REV CODE 250: Performed by: STUDENT IN AN ORGANIZED HEALTH CARE EDUCATION/TRAINING PROGRAM

## 2021-11-28 RX ORDER — ENOXAPARIN SODIUM 100 MG/ML
40 INJECTION SUBCUTANEOUS EVERY 24 HOURS
Status: DISCONTINUED | OUTPATIENT
Start: 2021-11-28 | End: 2021-12-01 | Stop reason: HOSPADM

## 2021-11-28 RX ORDER — POTASSIUM CHLORIDE 20 MEQ/1
20 TABLET, EXTENDED RELEASE ORAL ONCE
Status: COMPLETED | OUTPATIENT
Start: 2021-11-28 | End: 2021-11-28

## 2021-11-28 RX ADMIN — HYDRALAZINE HYDROCHLORIDE 10 MG: 10 TABLET, FILM COATED ORAL at 09:11

## 2021-11-28 RX ADMIN — HYDRALAZINE HYDROCHLORIDE 10 MG: 10 TABLET, FILM COATED ORAL at 06:11

## 2021-11-28 RX ADMIN — ISOSORBIDE DINITRATE 10 MG: 10 TABLET ORAL at 09:11

## 2021-11-28 RX ADMIN — ISOSORBIDE DINITRATE 10 MG: 10 TABLET ORAL at 08:11

## 2021-11-28 RX ADMIN — ENOXAPARIN SODIUM 40 MG: 40 INJECTION SUBCUTANEOUS at 05:11

## 2021-11-28 RX ADMIN — DOXEPIN HYDROCHLORIDE 25 MG: 25 CAPSULE ORAL at 09:11

## 2021-11-28 RX ADMIN — POTASSIUM CHLORIDE 20 MEQ: 1500 TABLET, EXTENDED RELEASE ORAL at 11:11

## 2021-11-28 RX ADMIN — PHENYTOIN SODIUM 200 MG: 100 CAPSULE ORAL at 09:11

## 2021-11-28 RX ADMIN — CLONIDINE HYDROCHLORIDE 0.1 MG: 0.1 TABLET ORAL at 08:11

## 2021-11-28 RX ADMIN — FUROSEMIDE 40 MG: 40 INJECTION, SOLUTION INTRAMUSCULAR; INTRAVENOUS at 05:11

## 2021-11-28 RX ADMIN — HYDRALAZINE HYDROCHLORIDE 10 MG: 10 TABLET, FILM COATED ORAL at 01:11

## 2021-11-28 RX ADMIN — ISOSORBIDE DINITRATE 10 MG: 10 TABLET ORAL at 04:11

## 2021-11-28 RX ADMIN — ENOXAPARIN SODIUM 120 MG: 150 INJECTION SUBCUTANEOUS at 06:11

## 2021-11-28 RX ADMIN — CLONIDINE HYDROCHLORIDE 0.1 MG: 0.1 TABLET ORAL at 09:11

## 2021-11-28 RX ADMIN — FUROSEMIDE 40 MG: 40 INJECTION, SOLUTION INTRAMUSCULAR; INTRAVENOUS at 09:11

## 2021-11-28 RX ADMIN — ATORVASTATIN CALCIUM 10 MG: 10 TABLET, FILM COATED ORAL at 09:11

## 2021-11-28 RX ADMIN — PHENYTOIN SODIUM 200 MG: 100 CAPSULE ORAL at 08:11

## 2021-11-28 RX ADMIN — AMLODIPINE BESYLATE 10 MG: 10 TABLET ORAL at 08:11

## 2021-11-28 RX ADMIN — LOSARTAN POTASSIUM 25 MG: 25 TABLET, FILM COATED ORAL at 08:11

## 2021-11-28 RX ADMIN — PHENYTOIN SODIUM 200 MG: 100 CAPSULE ORAL at 04:11

## 2021-11-29 LAB
ANION GAP SERPL CALC-SCNC: 10 MMOL/L (ref 8–16)
BASOPHILS # BLD AUTO: 0.05 K/UL (ref 0–0.2)
BASOPHILS NFR BLD: 0.8 % (ref 0–1.9)
BILIRUB UR QL STRIP: NEGATIVE
BUN SERPL-MCNC: 20 MG/DL (ref 6–20)
CALCIUM SERPL-MCNC: 8.1 MG/DL (ref 8.7–10.5)
CHLORIDE SERPL-SCNC: 105 MMOL/L (ref 95–110)
CLARITY UR: CLEAR
CO2 SERPL-SCNC: 28 MMOL/L (ref 23–29)
COLOR UR: YELLOW
CREAT SERPL-MCNC: 1.7 MG/DL (ref 0.5–1.4)
DIFFERENTIAL METHOD: ABNORMAL
EOSINOPHIL # BLD AUTO: 0.3 K/UL (ref 0–0.5)
EOSINOPHIL NFR BLD: 4.9 % (ref 0–8)
ERYTHROCYTE [DISTWIDTH] IN BLOOD BY AUTOMATED COUNT: 13.2 % (ref 11.5–14.5)
EST. GFR  (AFRICAN AMERICAN): 54 ML/MIN/1.73 M^2
EST. GFR  (NON AFRICAN AMERICAN): 47 ML/MIN/1.73 M^2
GLUCOSE SERPL-MCNC: 96 MG/DL (ref 70–110)
GLUCOSE UR QL STRIP: NEGATIVE
HCT VFR BLD AUTO: 35.7 % (ref 40–54)
HGB BLD-MCNC: 11.2 G/DL (ref 14–18)
HGB UR QL STRIP: NEGATIVE
IMM GRANULOCYTES # BLD AUTO: 0.01 K/UL (ref 0–0.04)
IMM GRANULOCYTES NFR BLD AUTO: 0.2 % (ref 0–0.5)
KETONES UR QL STRIP: NEGATIVE
LEUKOCYTE ESTERASE UR QL STRIP: NEGATIVE
LYMPHOCYTES # BLD AUTO: 2.1 K/UL (ref 1–4.8)
LYMPHOCYTES NFR BLD: 31.6 % (ref 18–48)
MCH RBC QN AUTO: 25.6 PG (ref 27–31)
MCHC RBC AUTO-ENTMCNC: 31.4 G/DL (ref 32–36)
MCV RBC AUTO: 82 FL (ref 82–98)
MONOCYTES # BLD AUTO: 0.7 K/UL (ref 0.3–1)
MONOCYTES NFR BLD: 10.6 % (ref 4–15)
NEUTROPHILS # BLD AUTO: 3.4 K/UL (ref 1.8–7.7)
NEUTROPHILS NFR BLD: 51.9 % (ref 38–73)
NITRITE UR QL STRIP: NEGATIVE
NRBC BLD-RTO: 0 /100 WBC
PH UR STRIP: 7 [PH] (ref 5–8)
PLATELET # BLD AUTO: 203 K/UL (ref 150–450)
PMV BLD AUTO: 12.5 FL (ref 9.2–12.9)
POCT GLUCOSE: 116 MG/DL (ref 70–110)
POCT GLUCOSE: 124 MG/DL (ref 70–110)
POCT GLUCOSE: 137 MG/DL (ref 70–110)
POCT GLUCOSE: 139 MG/DL (ref 70–110)
POCT GLUCOSE: 93 MG/DL (ref 70–110)
POTASSIUM SERPL-SCNC: 3.6 MMOL/L (ref 3.5–5.1)
PROT UR QL STRIP: NEGATIVE
RBC # BLD AUTO: 4.37 M/UL (ref 4.6–6.2)
SODIUM SERPL-SCNC: 143 MMOL/L (ref 136–145)
SP GR UR STRIP: 1.01 (ref 1–1.03)
URN SPEC COLLECT METH UR: NORMAL
UROBILINOGEN UR STRIP-ACNC: NEGATIVE EU/DL
WBC # BLD AUTO: 6.51 K/UL (ref 3.9–12.7)

## 2021-11-29 PROCEDURE — 27000221 HC OXYGEN, UP TO 24 HOURS

## 2021-11-29 PROCEDURE — 11000001 HC ACUTE MED/SURG PRIVATE ROOM

## 2021-11-29 PROCEDURE — 36415 COLL VENOUS BLD VENIPUNCTURE: CPT | Performed by: NURSE PRACTITIONER

## 2021-11-29 PROCEDURE — 25000003 PHARM REV CODE 250: Performed by: STUDENT IN AN ORGANIZED HEALTH CARE EDUCATION/TRAINING PROGRAM

## 2021-11-29 PROCEDURE — 25000003 PHARM REV CODE 250: Performed by: NURSE PRACTITIONER

## 2021-11-29 PROCEDURE — 63600175 PHARM REV CODE 636 W HCPCS: Performed by: NURSE PRACTITIONER

## 2021-11-29 PROCEDURE — 99233 SBSQ HOSP IP/OBS HIGH 50: CPT | Mod: ,,, | Performed by: NURSE PRACTITIONER

## 2021-11-29 PROCEDURE — 97110 THERAPEUTIC EXERCISES: CPT

## 2021-11-29 PROCEDURE — 25000003 PHARM REV CODE 250: Performed by: INTERNAL MEDICINE

## 2021-11-29 PROCEDURE — 81003 URINALYSIS AUTO W/O SCOPE: CPT | Performed by: NURSE PRACTITIONER

## 2021-11-29 PROCEDURE — 97530 THERAPEUTIC ACTIVITIES: CPT

## 2021-11-29 PROCEDURE — 80048 BASIC METABOLIC PNL TOTAL CA: CPT | Performed by: NURSE PRACTITIONER

## 2021-11-29 PROCEDURE — 94761 N-INVAS EAR/PLS OXIMETRY MLT: CPT

## 2021-11-29 PROCEDURE — 99233 PR SUBSEQUENT HOSPITAL CARE,LEVL III: ICD-10-PCS | Mod: ,,, | Performed by: NURSE PRACTITIONER

## 2021-11-29 PROCEDURE — 94799 UNLISTED PULMONARY SVC/PX: CPT

## 2021-11-29 PROCEDURE — 85025 COMPLETE CBC W/AUTO DIFF WBC: CPT | Performed by: NURSE PRACTITIONER

## 2021-11-29 PROCEDURE — 99900035 HC TECH TIME PER 15 MIN (STAT)

## 2021-11-29 RX ORDER — CARVEDILOL 3.12 MG/1
3.12 TABLET ORAL 2 TIMES DAILY
Status: DISCONTINUED | OUTPATIENT
Start: 2021-11-29 | End: 2021-12-01

## 2021-11-29 RX ORDER — POTASSIUM CHLORIDE 20 MEQ/1
40 TABLET, EXTENDED RELEASE ORAL 2 TIMES DAILY
Status: COMPLETED | OUTPATIENT
Start: 2021-11-29 | End: 2021-11-30

## 2021-11-29 RX ADMIN — POTASSIUM CHLORIDE 40 MEQ: 1500 TABLET, EXTENDED RELEASE ORAL at 09:11

## 2021-11-29 RX ADMIN — CLONIDINE HYDROCHLORIDE 0.1 MG: 0.1 TABLET ORAL at 09:11

## 2021-11-29 RX ADMIN — DOXEPIN HYDROCHLORIDE 25 MG: 25 CAPSULE ORAL at 09:11

## 2021-11-29 RX ADMIN — HYDRALAZINE HYDROCHLORIDE 10 MG: 10 TABLET, FILM COATED ORAL at 09:11

## 2021-11-29 RX ADMIN — BENZONATATE 100 MG: 100 CAPSULE ORAL at 05:11

## 2021-11-29 RX ADMIN — CARVEDILOL 3.12 MG: 3.12 TABLET, FILM COATED ORAL at 09:11

## 2021-11-29 RX ADMIN — HYDRALAZINE HYDROCHLORIDE 10 MG: 10 TABLET, FILM COATED ORAL at 05:11

## 2021-11-29 RX ADMIN — PHENYTOIN SODIUM 200 MG: 100 CAPSULE ORAL at 02:11

## 2021-11-29 RX ADMIN — AMLODIPINE BESYLATE 10 MG: 10 TABLET ORAL at 08:11

## 2021-11-29 RX ADMIN — CLONIDINE HYDROCHLORIDE 0.1 MG: 0.1 TABLET ORAL at 08:11

## 2021-11-29 RX ADMIN — ISOSORBIDE DINITRATE 10 MG: 10 TABLET ORAL at 02:11

## 2021-11-29 RX ADMIN — HYDRALAZINE HYDROCHLORIDE 10 MG: 10 TABLET, FILM COATED ORAL at 02:11

## 2021-11-29 RX ADMIN — PHENYTOIN SODIUM 200 MG: 100 CAPSULE ORAL at 09:11

## 2021-11-29 RX ADMIN — ISOSORBIDE DINITRATE 10 MG: 10 TABLET ORAL at 09:11

## 2021-11-29 RX ADMIN — POTASSIUM CHLORIDE 40 MEQ: 1500 TABLET, EXTENDED RELEASE ORAL at 12:11

## 2021-11-29 RX ADMIN — ENOXAPARIN SODIUM 40 MG: 40 INJECTION SUBCUTANEOUS at 04:11

## 2021-11-29 RX ADMIN — FUROSEMIDE 40 MG: 40 INJECTION, SOLUTION INTRAMUSCULAR; INTRAVENOUS at 04:11

## 2021-11-29 RX ADMIN — ATORVASTATIN CALCIUM 10 MG: 10 TABLET, FILM COATED ORAL at 09:11

## 2021-11-29 RX ADMIN — FUROSEMIDE 40 MG: 40 INJECTION, SOLUTION INTRAMUSCULAR; INTRAVENOUS at 08:11

## 2021-11-29 RX ADMIN — POTASSIUM CHLORIDE 40 MEQ: 1500 TABLET, EXTENDED RELEASE ORAL at 08:11

## 2021-11-29 RX ADMIN — SACUBITRIL AND VALSARTAN 1 TABLET: 24; 26 TABLET, FILM COATED ORAL at 09:11

## 2021-11-29 RX ADMIN — ISOSORBIDE DINITRATE 10 MG: 10 TABLET ORAL at 08:11

## 2021-11-29 RX ADMIN — PHENYTOIN SODIUM 200 MG: 100 CAPSULE ORAL at 08:11

## 2021-11-29 RX ADMIN — LOSARTAN POTASSIUM 25 MG: 25 TABLET, FILM COATED ORAL at 08:11

## 2021-11-30 ENCOUNTER — DOCUMENTATION ONLY (OUTPATIENT)
Dept: TRANSPLANT | Facility: CLINIC | Age: 46
End: 2021-11-30
Payer: COMMERCIAL

## 2021-11-30 PROBLEM — J90 PLEURAL EFFUSION: Status: ACTIVE | Noted: 2021-11-30

## 2021-11-30 LAB
ANION GAP SERPL CALC-SCNC: 7 MMOL/L (ref 8–16)
APPEARANCE FLD: CLEAR
BASOPHILS # BLD AUTO: 0.05 K/UL (ref 0–0.2)
BASOPHILS NFR BLD: 0.8 % (ref 0–1.9)
BODY FLD TYPE: NORMAL
BUN SERPL-MCNC: 19 MG/DL (ref 6–20)
CALCIUM SERPL-MCNC: 8.3 MG/DL (ref 8.7–10.5)
CHLORIDE SERPL-SCNC: 106 MMOL/L (ref 95–110)
CO2 SERPL-SCNC: 28 MMOL/L (ref 23–29)
COLOR FLD: YELLOW
CREAT SERPL-MCNC: 1.5 MG/DL (ref 0.5–1.4)
CV STRESS BASE HR: 81 BPM
DIASTOLIC BLOOD PRESSURE: 93 MMHG
DIFFERENTIAL METHOD: ABNORMAL
EJECTION FRACTION- HIGH: 73 %
END DIASTOLIC INDEX-HIGH: 165 ML/M2
END DIASTOLIC INDEX-LOW: 101 ML/M2
END SYSTOLIC INDEX-HIGH: 64 ML/M2
END SYSTOLIC INDEX-LOW: 28 ML/M2
EOSINOPHIL # BLD AUTO: 0.3 K/UL (ref 0–0.5)
EOSINOPHIL NFR BLD: 5.1 % (ref 0–8)
ERYTHROCYTE [DISTWIDTH] IN BLOOD BY AUTOMATED COUNT: 13.1 % (ref 11.5–14.5)
EST. GFR  (AFRICAN AMERICAN): >60 ML/MIN/1.73 M^2
EST. GFR  (NON AFRICAN AMERICAN): 54 ML/MIN/1.73 M^2
GLUCOSE SERPL-MCNC: 149 MG/DL (ref 70–110)
HCT VFR BLD AUTO: 36.3 % (ref 40–54)
HGB BLD-MCNC: 11.3 G/DL (ref 14–18)
IMM GRANULOCYTES # BLD AUTO: 0.01 K/UL (ref 0–0.04)
IMM GRANULOCYTES NFR BLD AUTO: 0.2 % (ref 0–0.5)
LDH SERPL L TO P-CCNC: 166 U/L (ref 110–260)
LYMPHOCYTES # BLD AUTO: 1.7 K/UL (ref 1–4.8)
LYMPHOCYTES NFR BLD: 28.1 % (ref 18–48)
LYMPHOCYTES NFR FLD MANUAL: 48 %
MCH RBC QN AUTO: 25.6 PG (ref 27–31)
MCHC RBC AUTO-ENTMCNC: 31.1 G/DL (ref 32–36)
MCV RBC AUTO: 82 FL (ref 82–98)
MONOCYTES # BLD AUTO: 0.7 K/UL (ref 0.3–1)
MONOCYTES NFR BLD: 12.1 % (ref 4–15)
MONOS+MACROS NFR FLD MANUAL: 51 %
NEUTROPHILS # BLD AUTO: 3.2 K/UL (ref 1.8–7.7)
NEUTROPHILS NFR BLD: 53.7 % (ref 38–73)
NEUTROPHILS NFR FLD MANUAL: 1 %
NRBC BLD-RTO: 0 /100 WBC
NUC REST EJECTION FRACTION: 22
NUC STRESS EJECTION FRACTION: 22 %
OHS CV CPX 85 PERCENT MAX PREDICTED HEART RATE MALE: 146
OHS CV CPX MAX PREDICTED HEART RATE: 172
OHS CV CPX PATIENT IS FEMALE: 0
OHS CV CPX PATIENT IS MALE: 1
OHS CV CPX PEAK DIASTOLIC BLOOD PRESSURE: 93 MMHG
OHS CV CPX PEAK HEAR RATE: 102 BPM
OHS CV CPX PEAK RATE PRESSURE PRODUCT: NORMAL
OHS CV CPX PEAK SYSTOLIC BLOOD PRESSURE: 125 MMHG
OHS CV CPX PERCENT MAX PREDICTED HEART RATE ACHIEVED: 59
OHS CV CPX RATE PRESSURE PRODUCT PRESENTING: NORMAL
PLATELET # BLD AUTO: 207 K/UL (ref 150–450)
PMV BLD AUTO: 12.9 FL (ref 9.2–12.9)
POCT GLUCOSE: 130 MG/DL (ref 70–110)
POCT GLUCOSE: 151 MG/DL (ref 70–110)
POCT GLUCOSE: 174 MG/DL (ref 70–110)
POTASSIUM SERPL-SCNC: 3.9 MMOL/L (ref 3.5–5.1)
PROCALCITONIN SERPL IA-MCNC: 0.07 NG/ML
RBC # BLD AUTO: 4.41 M/UL (ref 4.6–6.2)
RETIRED EF AND QEF - SEE NOTES: 59 %
SODIUM SERPL-SCNC: 141 MMOL/L (ref 136–145)
SYSTOLIC BLOOD PRESSURE: 125 MMHG
TRIGL SERPL-MCNC: 83 MG/DL (ref 30–150)
WBC # BLD AUTO: 5.94 K/UL (ref 3.9–12.7)
WBC # FLD: 187 /CU MM

## 2021-11-30 PROCEDURE — 87075 CULTR BACTERIA EXCEPT BLOOD: CPT | Performed by: INTERNAL MEDICINE

## 2021-11-30 PROCEDURE — 25000003 PHARM REV CODE 250: Performed by: NURSE PRACTITIONER

## 2021-11-30 PROCEDURE — 88112 CYTOPATH CELL ENHANCE TECH: CPT | Performed by: PATHOLOGY

## 2021-11-30 PROCEDURE — 99233 SBSQ HOSP IP/OBS HIGH 50: CPT | Mod: ,,, | Performed by: INTERNAL MEDICINE

## 2021-11-30 PROCEDURE — 87205 SMEAR GRAM STAIN: CPT | Performed by: INTERNAL MEDICINE

## 2021-11-30 PROCEDURE — 94761 N-INVAS EAR/PLS OXIMETRY MLT: CPT

## 2021-11-30 PROCEDURE — 84478 ASSAY OF TRIGLYCERIDES: CPT | Performed by: INTERNAL MEDICINE

## 2021-11-30 PROCEDURE — 99900035 HC TECH TIME PER 15 MIN (STAT)

## 2021-11-30 PROCEDURE — 89051 BODY FLUID CELL COUNT: CPT | Performed by: INTERNAL MEDICINE

## 2021-11-30 PROCEDURE — 80048 BASIC METABOLIC PNL TOTAL CA: CPT | Performed by: NURSE PRACTITIONER

## 2021-11-30 PROCEDURE — 25000003 PHARM REV CODE 250: Performed by: INTERNAL MEDICINE

## 2021-11-30 PROCEDURE — 87116 MYCOBACTERIA CULTURE: CPT | Performed by: INTERNAL MEDICINE

## 2021-11-30 PROCEDURE — 84478 ASSAY OF TRIGLYCERIDES: CPT | Mod: 91 | Performed by: INTERNAL MEDICINE

## 2021-11-30 PROCEDURE — 88112 PR  CYTOPATH, CELL ENHANCE TECH: ICD-10-PCS | Mod: 26,,, | Performed by: PATHOLOGY

## 2021-11-30 PROCEDURE — 99233 SBSQ HOSP IP/OBS HIGH 50: CPT | Mod: ,,, | Performed by: NURSE PRACTITIONER

## 2021-11-30 PROCEDURE — 11000001 HC ACUTE MED/SURG PRIVATE ROOM

## 2021-11-30 PROCEDURE — 97110 THERAPEUTIC EXERCISES: CPT

## 2021-11-30 PROCEDURE — 83615 LACTATE (LD) (LDH) ENZYME: CPT | Performed by: INTERNAL MEDICINE

## 2021-11-30 PROCEDURE — 63600175 PHARM REV CODE 636 W HCPCS: Performed by: NURSE PRACTITIONER

## 2021-11-30 PROCEDURE — 88112 CYTOPATH CELL ENHANCE TECH: CPT | Mod: 26,,, | Performed by: PATHOLOGY

## 2021-11-30 PROCEDURE — 99233 PR SUBSEQUENT HOSPITAL CARE,LEVL III: ICD-10-PCS | Mod: ,,, | Performed by: INTERNAL MEDICINE

## 2021-11-30 PROCEDURE — 87206 SMEAR FLUORESCENT/ACID STAI: CPT | Performed by: INTERNAL MEDICINE

## 2021-11-30 PROCEDURE — 87070 CULTURE OTHR SPECIMN AEROBIC: CPT | Performed by: INTERNAL MEDICINE

## 2021-11-30 PROCEDURE — 36415 COLL VENOUS BLD VENIPUNCTURE: CPT | Performed by: NURSE PRACTITIONER

## 2021-11-30 PROCEDURE — 88305 TISSUE EXAM BY PATHOLOGIST: CPT | Performed by: PATHOLOGY

## 2021-11-30 PROCEDURE — 97530 THERAPEUTIC ACTIVITIES: CPT

## 2021-11-30 PROCEDURE — 83615 LACTATE (LD) (LDH) ENZYME: CPT | Mod: 91 | Performed by: INTERNAL MEDICINE

## 2021-11-30 PROCEDURE — 87102 FUNGUS ISOLATION CULTURE: CPT | Performed by: INTERNAL MEDICINE

## 2021-11-30 PROCEDURE — 85025 COMPLETE CBC W/AUTO DIFF WBC: CPT | Performed by: NURSE PRACTITIONER

## 2021-11-30 PROCEDURE — 88305 TISSUE EXAM BY PATHOLOGIST: ICD-10-PCS | Mod: 26,,, | Performed by: PATHOLOGY

## 2021-11-30 PROCEDURE — 84145 PROCALCITONIN (PCT): CPT | Performed by: INTERNAL MEDICINE

## 2021-11-30 PROCEDURE — 88305 TISSUE EXAM BY PATHOLOGIST: CPT | Mod: 26,,, | Performed by: PATHOLOGY

## 2021-11-30 PROCEDURE — 25000003 PHARM REV CODE 250: Performed by: STUDENT IN AN ORGANIZED HEALTH CARE EDUCATION/TRAINING PROGRAM

## 2021-11-30 PROCEDURE — 99233 PR SUBSEQUENT HOSPITAL CARE,LEVL III: ICD-10-PCS | Mod: ,,, | Performed by: NURSE PRACTITIONER

## 2021-11-30 PROCEDURE — 27000221 HC OXYGEN, UP TO 24 HOURS

## 2021-11-30 RX ORDER — REGADENOSON 0.08 MG/ML
0.4 INJECTION, SOLUTION INTRAVENOUS ONCE
Status: COMPLETED | OUTPATIENT
Start: 2021-11-30 | End: 2021-11-30

## 2021-11-30 RX ORDER — CLONIDINE HYDROCHLORIDE 0.1 MG/1
0.1 TABLET ORAL NIGHTLY
Status: DISCONTINUED | OUTPATIENT
Start: 2021-12-01 | End: 2021-12-01 | Stop reason: HOSPADM

## 2021-11-30 RX ADMIN — BENZONATATE 100 MG: 100 CAPSULE ORAL at 09:11

## 2021-11-30 RX ADMIN — PHENYTOIN SODIUM 200 MG: 100 CAPSULE ORAL at 03:11

## 2021-11-30 RX ADMIN — ISOSORBIDE DINITRATE 10 MG: 10 TABLET ORAL at 09:11

## 2021-11-30 RX ADMIN — CARVEDILOL 3.12 MG: 3.12 TABLET, FILM COATED ORAL at 09:11

## 2021-11-30 RX ADMIN — HYDRALAZINE HYDROCHLORIDE 10 MG: 10 TABLET, FILM COATED ORAL at 09:11

## 2021-11-30 RX ADMIN — AMLODIPINE BESYLATE 10 MG: 10 TABLET ORAL at 09:11

## 2021-11-30 RX ADMIN — PHENYTOIN SODIUM 200 MG: 100 CAPSULE ORAL at 09:11

## 2021-11-30 RX ADMIN — SACUBITRIL AND VALSARTAN 1 TABLET: 24; 26 TABLET, FILM COATED ORAL at 09:11

## 2021-11-30 RX ADMIN — CLONIDINE HYDROCHLORIDE 0.1 MG: 0.1 TABLET ORAL at 09:11

## 2021-11-30 RX ADMIN — ATORVASTATIN CALCIUM 10 MG: 10 TABLET, FILM COATED ORAL at 09:11

## 2021-11-30 RX ADMIN — REGADENOSON 0.4 MG: 0.08 INJECTION, SOLUTION INTRAVENOUS at 09:11

## 2021-11-30 RX ADMIN — HYDRALAZINE HYDROCHLORIDE 10 MG: 10 TABLET, FILM COATED ORAL at 01:11

## 2021-11-30 RX ADMIN — FUROSEMIDE 40 MG: 40 INJECTION, SOLUTION INTRAMUSCULAR; INTRAVENOUS at 05:11

## 2021-11-30 RX ADMIN — DOXEPIN HYDROCHLORIDE 25 MG: 25 CAPSULE ORAL at 09:11

## 2021-11-30 RX ADMIN — INSULIN ASPART 2 UNITS: 100 INJECTION, SOLUTION INTRAVENOUS; SUBCUTANEOUS at 05:11

## 2021-11-30 RX ADMIN — ISOSORBIDE DINITRATE 10 MG: 10 TABLET ORAL at 03:11

## 2021-11-30 RX ADMIN — POTASSIUM CHLORIDE 40 MEQ: 1500 TABLET, EXTENDED RELEASE ORAL at 09:11

## 2021-11-30 RX ADMIN — FUROSEMIDE 40 MG: 40 INJECTION, SOLUTION INTRAMUSCULAR; INTRAVENOUS at 09:11

## 2021-11-30 RX ADMIN — HYDRALAZINE HYDROCHLORIDE 10 MG: 10 TABLET, FILM COATED ORAL at 05:11

## 2021-12-01 VITALS
OXYGEN SATURATION: 93 % | DIASTOLIC BLOOD PRESSURE: 98 MMHG | TEMPERATURE: 97 F | SYSTOLIC BLOOD PRESSURE: 144 MMHG | WEIGHT: 219.56 LBS | HEIGHT: 72 IN | BODY MASS INDEX: 29.74 KG/M2 | RESPIRATION RATE: 16 BRPM | HEART RATE: 88 BPM

## 2021-12-01 PROBLEM — I50.42 CHRONIC COMBINED SYSTOLIC AND DIASTOLIC HEART FAILURE: Status: ACTIVE | Noted: 2021-12-01

## 2021-12-01 PROBLEM — N17.9 AKI (ACUTE KIDNEY INJURY): Status: RESOLVED | Noted: 2021-11-27 | Resolved: 2021-12-01

## 2021-12-01 PROBLEM — I50.9 ACUTE CONGESTIVE HEART FAILURE: Status: RESOLVED | Noted: 2021-11-27 | Resolved: 2021-12-01

## 2021-12-01 PROBLEM — J90 PLEURAL EFFUSION: Status: RESOLVED | Noted: 2021-11-30 | Resolved: 2021-12-01

## 2021-12-01 PROBLEM — R79.89 ELEVATED D-DIMER: Status: RESOLVED | Noted: 2021-11-27 | Resolved: 2021-12-01

## 2021-12-01 LAB
ANION GAP SERPL CALC-SCNC: 10 MMOL/L (ref 8–16)
BASOPHILS # BLD AUTO: 0.04 K/UL (ref 0–0.2)
BASOPHILS NFR BLD: 0.7 % (ref 0–1.9)
BODY FLUID SOURCE, LDH: NORMAL
BUN SERPL-MCNC: 16 MG/DL (ref 6–20)
CALCIUM SERPL-MCNC: 8.5 MG/DL (ref 8.7–10.5)
CHLORIDE SERPL-SCNC: 108 MMOL/L (ref 95–110)
CO2 SERPL-SCNC: 25 MMOL/L (ref 23–29)
CREAT SERPL-MCNC: 1.6 MG/DL (ref 0.5–1.4)
DIFFERENTIAL METHOD: ABNORMAL
EOSINOPHIL # BLD AUTO: 0.3 K/UL (ref 0–0.5)
EOSINOPHIL NFR BLD: 4.9 % (ref 0–8)
ERYTHROCYTE [DISTWIDTH] IN BLOOD BY AUTOMATED COUNT: 13 % (ref 11.5–14.5)
EST. GFR  (AFRICAN AMERICAN): 58 ML/MIN/1.73 M^2
EST. GFR  (NON AFRICAN AMERICAN): 50 ML/MIN/1.73 M^2
GLUCOSE SERPL-MCNC: 114 MG/DL (ref 70–110)
GRAM STN SPEC: NORMAL
GRAM STN SPEC: NORMAL
HCT VFR BLD AUTO: 41.5 % (ref 40–54)
HGB BLD-MCNC: 13 G/DL (ref 14–18)
IMM GRANULOCYTES # BLD AUTO: 0.01 K/UL (ref 0–0.04)
IMM GRANULOCYTES NFR BLD AUTO: 0.2 % (ref 0–0.5)
LDH FLD L TO P-CCNC: 70 U/L
LYMPHOCYTES # BLD AUTO: 1.7 K/UL (ref 1–4.8)
LYMPHOCYTES NFR BLD: 29.1 % (ref 18–48)
MCH RBC QN AUTO: 25.6 PG (ref 27–31)
MCHC RBC AUTO-ENTMCNC: 31.3 G/DL (ref 32–36)
MCV RBC AUTO: 82 FL (ref 82–98)
MONOCYTES # BLD AUTO: 0.6 K/UL (ref 0.3–1)
MONOCYTES NFR BLD: 9.7 % (ref 4–15)
NEUTROPHILS # BLD AUTO: 3.3 K/UL (ref 1.8–7.7)
NEUTROPHILS NFR BLD: 55.4 % (ref 38–73)
NRBC BLD-RTO: 0 /100 WBC
PATH INTERP FLD-IMP: NORMAL
PLATELET # BLD AUTO: 216 K/UL (ref 150–450)
PMV BLD AUTO: 11.8 FL (ref 9.2–12.9)
POCT GLUCOSE: 126 MG/DL (ref 70–110)
POCT GLUCOSE: 130 MG/DL (ref 70–110)
POTASSIUM SERPL-SCNC: 3.9 MMOL/L (ref 3.5–5.1)
RBC # BLD AUTO: 5.08 M/UL (ref 4.6–6.2)
SODIUM SERPL-SCNC: 143 MMOL/L (ref 136–145)
WBC # BLD AUTO: 5.97 K/UL (ref 3.9–12.7)

## 2021-12-01 PROCEDURE — 85025 COMPLETE CBC W/AUTO DIFF WBC: CPT | Performed by: NURSE PRACTITIONER

## 2021-12-01 PROCEDURE — 97530 THERAPEUTIC ACTIVITIES: CPT | Performed by: PHYSICAL THERAPIST

## 2021-12-01 PROCEDURE — 99233 SBSQ HOSP IP/OBS HIGH 50: CPT | Mod: ,,, | Performed by: NURSE PRACTITIONER

## 2021-12-01 PROCEDURE — 63600175 PHARM REV CODE 636 W HCPCS: Performed by: NURSE PRACTITIONER

## 2021-12-01 PROCEDURE — 25000003 PHARM REV CODE 250: Performed by: INTERNAL MEDICINE

## 2021-12-01 PROCEDURE — 99233 PR SUBSEQUENT HOSPITAL CARE,LEVL III: ICD-10-PCS | Mod: ,,, | Performed by: NURSE PRACTITIONER

## 2021-12-01 PROCEDURE — 97530 THERAPEUTIC ACTIVITIES: CPT

## 2021-12-01 PROCEDURE — 25000003 PHARM REV CODE 250: Performed by: NURSE PRACTITIONER

## 2021-12-01 PROCEDURE — 97110 THERAPEUTIC EXERCISES: CPT | Performed by: PHYSICAL THERAPIST

## 2021-12-01 PROCEDURE — 99900035 HC TECH TIME PER 15 MIN (STAT)

## 2021-12-01 PROCEDURE — 94761 N-INVAS EAR/PLS OXIMETRY MLT: CPT

## 2021-12-01 PROCEDURE — 80048 BASIC METABOLIC PNL TOTAL CA: CPT | Performed by: NURSE PRACTITIONER

## 2021-12-01 PROCEDURE — 25000003 PHARM REV CODE 250: Performed by: STUDENT IN AN ORGANIZED HEALTH CARE EDUCATION/TRAINING PROGRAM

## 2021-12-01 PROCEDURE — 36415 COLL VENOUS BLD VENIPUNCTURE: CPT | Performed by: NURSE PRACTITIONER

## 2021-12-01 RX ORDER — AMLODIPINE BESYLATE 5 MG/1
5 TABLET ORAL DAILY
Status: DISCONTINUED | OUTPATIENT
Start: 2021-12-02 | End: 2021-12-01

## 2021-12-01 RX ORDER — CARVEDILOL 6.25 MG/1
6.25 TABLET ORAL 2 TIMES DAILY
Qty: 60 TABLET | Refills: 1 | Status: SHIPPED | OUTPATIENT
Start: 2021-12-01 | End: 2021-12-09

## 2021-12-01 RX ORDER — DOXEPIN HYDROCHLORIDE 25 MG/1
25 CAPSULE ORAL NIGHTLY
Qty: 30 CAPSULE | Refills: 0 | Status: SHIPPED | OUTPATIENT
Start: 2021-12-01 | End: 2022-02-17 | Stop reason: SDUPTHER

## 2021-12-01 RX ORDER — CLONIDINE HYDROCHLORIDE 0.1 MG/1
0.1 TABLET ORAL NIGHTLY
Qty: 30 TABLET | Refills: 0 | Status: SHIPPED | OUTPATIENT
Start: 2021-12-01 | End: 2021-12-09 | Stop reason: ALTCHOICE

## 2021-12-01 RX ORDER — HYDRALAZINE HYDROCHLORIDE 10 MG/1
10 TABLET, FILM COATED ORAL EVERY 8 HOURS
Qty: 90 TABLET | Refills: 0 | Status: SHIPPED | OUTPATIENT
Start: 2021-12-01 | End: 2021-12-09 | Stop reason: SDUPTHER

## 2021-12-01 RX ORDER — CARVEDILOL 6.25 MG/1
6.25 TABLET ORAL 2 TIMES DAILY
Status: DISCONTINUED | OUTPATIENT
Start: 2021-12-01 | End: 2021-12-01 | Stop reason: HOSPADM

## 2021-12-01 RX ORDER — ISOSORBIDE DINITRATE 10 MG/1
10 TABLET ORAL 3 TIMES DAILY
Qty: 90 TABLET | Refills: 0 | Status: SHIPPED | OUTPATIENT
Start: 2021-12-01 | End: 2021-12-09 | Stop reason: SDUPTHER

## 2021-12-01 RX ORDER — FUROSEMIDE 40 MG/1
40 TABLET ORAL 2 TIMES DAILY
Qty: 60 TABLET | Refills: 0 | Status: SHIPPED | OUTPATIENT
Start: 2021-12-01 | End: 2021-12-09 | Stop reason: SDUPTHER

## 2021-12-01 RX ADMIN — FUROSEMIDE 40 MG: 40 INJECTION, SOLUTION INTRAMUSCULAR; INTRAVENOUS at 09:12

## 2021-12-01 RX ADMIN — ISOSORBIDE DINITRATE 10 MG: 10 TABLET ORAL at 09:12

## 2021-12-01 RX ADMIN — HYDRALAZINE HYDROCHLORIDE 10 MG: 10 TABLET, FILM COATED ORAL at 06:12

## 2021-12-01 RX ADMIN — SACUBITRIL AND VALSARTAN 1 TABLET: 24; 26 TABLET, FILM COATED ORAL at 09:12

## 2021-12-01 RX ADMIN — PHENYTOIN SODIUM 200 MG: 100 CAPSULE ORAL at 09:12

## 2021-12-02 ENCOUNTER — PATIENT OUTREACH (OUTPATIENT)
Dept: ADMINISTRATIVE | Facility: CLINIC | Age: 46
End: 2021-12-02
Payer: COMMERCIAL

## 2021-12-03 ENCOUNTER — TELEPHONE (OUTPATIENT)
Dept: TRANSPLANT | Facility: CLINIC | Age: 46
End: 2021-12-03
Payer: COMMERCIAL

## 2021-12-03 LAB
BACTERIA SPEC AEROBE CULT: NO GROWTH
TRIGL FLD-MCNC: 11 MG/DL

## 2021-12-06 LAB
COMMENT: NORMAL
FINAL PATHOLOGIC DIAGNOSIS: NORMAL
Lab: NORMAL

## 2021-12-07 LAB — BACTERIA SPEC ANAEROBE CULT: NORMAL

## 2021-12-09 ENCOUNTER — TELEPHONE (OUTPATIENT)
Dept: CARDIOLOGY | Facility: CLINIC | Age: 46
End: 2021-12-09

## 2021-12-09 ENCOUNTER — LAB VISIT (OUTPATIENT)
Dept: LAB | Facility: HOSPITAL | Age: 46
End: 2021-12-09
Attending: NURSE PRACTITIONER
Payer: COMMERCIAL

## 2021-12-09 ENCOUNTER — OFFICE VISIT (OUTPATIENT)
Dept: CARDIOLOGY | Facility: CLINIC | Age: 46
End: 2021-12-09
Payer: COMMERCIAL

## 2021-12-09 VITALS
SYSTOLIC BLOOD PRESSURE: 182 MMHG | WEIGHT: 218.25 LBS | OXYGEN SATURATION: 99 % | DIASTOLIC BLOOD PRESSURE: 122 MMHG | HEART RATE: 88 BPM | BODY MASS INDEX: 29.6 KG/M2

## 2021-12-09 DIAGNOSIS — I50.9 CHF (CONGESTIVE HEART FAILURE): ICD-10-CM

## 2021-12-09 DIAGNOSIS — I50.42 CHRONIC COMBINED SYSTOLIC AND DIASTOLIC HEART FAILURE: Primary | ICD-10-CM

## 2021-12-09 DIAGNOSIS — I15.2 HYPERTENSION ASSOCIATED WITH DIABETES: ICD-10-CM

## 2021-12-09 DIAGNOSIS — G47.30 SLEEP DISORDER BREATHING: ICD-10-CM

## 2021-12-09 DIAGNOSIS — I50.42 CHRONIC COMBINED SYSTOLIC AND DIASTOLIC HEART FAILURE: ICD-10-CM

## 2021-12-09 DIAGNOSIS — E11.59 HYPERTENSION ASSOCIATED WITH DIABETES: ICD-10-CM

## 2021-12-09 PROBLEM — E83.42 HYPOMAGNESEMIA: Status: RESOLVED | Noted: 2021-11-27 | Resolved: 2021-12-09

## 2021-12-09 PROBLEM — R19.7 DIARRHEA: Status: RESOLVED | Noted: 2021-11-27 | Resolved: 2021-12-09

## 2021-12-09 LAB
ANION GAP SERPL CALC-SCNC: 12 MMOL/L (ref 8–16)
BUN SERPL-MCNC: 18 MG/DL (ref 6–20)
CALCIUM SERPL-MCNC: 9.1 MG/DL (ref 8.7–10.5)
CHLORIDE SERPL-SCNC: 108 MMOL/L (ref 95–110)
CO2 SERPL-SCNC: 27 MMOL/L (ref 23–29)
CREAT SERPL-MCNC: 1.4 MG/DL (ref 0.5–1.4)
EST. GFR  (AFRICAN AMERICAN): >60 ML/MIN/1.73 M^2
EST. GFR  (NON AFRICAN AMERICAN): 59 ML/MIN/1.73 M^2
GLUCOSE SERPL-MCNC: 111 MG/DL (ref 70–110)
POTASSIUM SERPL-SCNC: 3.8 MMOL/L (ref 3.5–5.1)
SODIUM SERPL-SCNC: 147 MMOL/L (ref 136–145)

## 2021-12-09 PROCEDURE — 4010F PR ACE/ARB THEARPY RXD/TAKEN: ICD-10-PCS | Mod: CPTII,S$GLB,, | Performed by: NURSE PRACTITIONER

## 2021-12-09 PROCEDURE — 99999 PR PBB SHADOW E&M-EST. PATIENT-LVL IV: CPT | Mod: PBBFAC,,, | Performed by: NURSE PRACTITIONER

## 2021-12-09 PROCEDURE — 4010F ACE/ARB THERAPY RXD/TAKEN: CPT | Mod: CPTII,S$GLB,, | Performed by: NURSE PRACTITIONER

## 2021-12-09 PROCEDURE — 83880 ASSAY OF NATRIURETIC PEPTIDE: CPT | Performed by: NURSE PRACTITIONER

## 2021-12-09 PROCEDURE — 99215 OFFICE O/P EST HI 40 MIN: CPT | Mod: S$GLB,,, | Performed by: NURSE PRACTITIONER

## 2021-12-09 PROCEDURE — 99215 PR OFFICE/OUTPT VISIT, EST, LEVL V, 40-54 MIN: ICD-10-PCS | Mod: S$GLB,,, | Performed by: NURSE PRACTITIONER

## 2021-12-09 PROCEDURE — 99999 PR PBB SHADOW E&M-EST. PATIENT-LVL IV: ICD-10-PCS | Mod: PBBFAC,,, | Performed by: NURSE PRACTITIONER

## 2021-12-09 PROCEDURE — 80048 BASIC METABOLIC PNL TOTAL CA: CPT | Performed by: NURSE PRACTITIONER

## 2021-12-09 RX ORDER — ISOSORBIDE DINITRATE 10 MG/1
10 TABLET ORAL 3 TIMES DAILY
Qty: 90 TABLET | Refills: 6 | Status: SHIPPED | OUTPATIENT
Start: 2021-12-09 | End: 2021-12-30 | Stop reason: SDUPTHER

## 2021-12-09 RX ORDER — FUROSEMIDE 40 MG/1
40 TABLET ORAL 2 TIMES DAILY
Qty: 60 TABLET | Refills: 6 | Status: SHIPPED | OUTPATIENT
Start: 2021-12-09 | End: 2021-12-30 | Stop reason: SDUPTHER

## 2021-12-09 RX ORDER — SACUBITRIL AND VALSARTAN 49; 51 MG/1; MG/1
1 TABLET, FILM COATED ORAL 2 TIMES DAILY
Qty: 60 TABLET | Refills: 6 | Status: SHIPPED | OUTPATIENT
Start: 2021-12-09 | End: 2021-12-30 | Stop reason: SDUPTHER

## 2021-12-09 RX ORDER — CARVEDILOL 12.5 MG/1
12.5 TABLET ORAL 2 TIMES DAILY
Qty: 60 TABLET | Refills: 6 | Status: SHIPPED | OUTPATIENT
Start: 2021-12-09 | End: 2021-12-30 | Stop reason: SDUPTHER

## 2021-12-09 RX ORDER — HYDRALAZINE HYDROCHLORIDE 10 MG/1
10 TABLET, FILM COATED ORAL EVERY 8 HOURS
Qty: 90 TABLET | Refills: 6 | Status: SHIPPED | OUTPATIENT
Start: 2021-12-09 | End: 2021-12-30 | Stop reason: SDUPTHER

## 2021-12-11 LAB — NT-PROBNP SERPL-MCNC: 2799 PG/ML

## 2021-12-16 ENCOUNTER — TELEPHONE (OUTPATIENT)
Dept: CARDIOLOGY | Facility: CLINIC | Age: 46
End: 2021-12-16
Payer: COMMERCIAL

## 2021-12-20 ENCOUNTER — TELEPHONE (OUTPATIENT)
Dept: CARDIOLOGY | Facility: CLINIC | Age: 46
End: 2021-12-20
Payer: COMMERCIAL

## 2021-12-26 ENCOUNTER — TELEPHONE (OUTPATIENT)
Dept: PULMONOLOGY | Facility: HOSPITAL | Age: 46
End: 2021-12-26
Payer: COMMERCIAL

## 2021-12-28 LAB — FUNGUS SPEC CULT: NORMAL

## 2021-12-30 ENCOUNTER — TELEPHONE (OUTPATIENT)
Dept: TRANSPLANT | Facility: CLINIC | Age: 46
End: 2021-12-30
Payer: COMMERCIAL

## 2021-12-30 DIAGNOSIS — I50.42 CHRONIC COMBINED SYSTOLIC AND DIASTOLIC HEART FAILURE: ICD-10-CM

## 2021-12-30 RX ORDER — HYDRALAZINE HYDROCHLORIDE 10 MG/1
10 TABLET, FILM COATED ORAL EVERY 8 HOURS
Qty: 90 TABLET | Refills: 6 | Status: SHIPPED | OUTPATIENT
Start: 2021-12-30 | End: 2022-01-28

## 2021-12-30 RX ORDER — SACUBITRIL AND VALSARTAN 49; 51 MG/1; MG/1
1 TABLET, FILM COATED ORAL 2 TIMES DAILY
Qty: 60 TABLET | Refills: 6 | Status: SHIPPED | OUTPATIENT
Start: 2021-12-30 | End: 2022-01-11

## 2021-12-30 RX ORDER — CARVEDILOL 12.5 MG/1
12.5 TABLET ORAL 2 TIMES DAILY
Qty: 60 TABLET | Refills: 6 | Status: SHIPPED | OUTPATIENT
Start: 2021-12-30 | End: 2022-01-11

## 2021-12-30 RX ORDER — ISOSORBIDE DINITRATE 10 MG/1
10 TABLET ORAL 3 TIMES DAILY
Qty: 90 TABLET | Refills: 6 | Status: SHIPPED | OUTPATIENT
Start: 2021-12-30 | End: 2022-01-11 | Stop reason: SDUPTHER

## 2021-12-30 RX ORDER — FUROSEMIDE 40 MG/1
40 TABLET ORAL 2 TIMES DAILY
Qty: 60 TABLET | Refills: 6 | Status: SHIPPED | OUTPATIENT
Start: 2021-12-30 | End: 2022-01-12

## 2022-01-05 ENCOUNTER — TELEPHONE (OUTPATIENT)
Dept: ADMINISTRATIVE | Facility: HOSPITAL | Age: 47
End: 2022-01-05
Payer: COMMERCIAL

## 2022-01-11 ENCOUNTER — OFFICE VISIT (OUTPATIENT)
Dept: TRANSPLANT | Facility: CLINIC | Age: 47
End: 2022-01-11
Payer: COMMERCIAL

## 2022-01-11 VITALS
HEART RATE: 93 BPM | OXYGEN SATURATION: 98 % | DIASTOLIC BLOOD PRESSURE: 100 MMHG | HEIGHT: 72 IN | BODY MASS INDEX: 29.6 KG/M2 | SYSTOLIC BLOOD PRESSURE: 150 MMHG

## 2022-01-11 DIAGNOSIS — E78.5 DYSLIPIDEMIA, GOAL LDL BELOW 100: ICD-10-CM

## 2022-01-11 DIAGNOSIS — G83.9 PARALYSIS: ICD-10-CM

## 2022-01-11 DIAGNOSIS — I27.20 PULMONARY HTN: ICD-10-CM

## 2022-01-11 DIAGNOSIS — Z86.73 HISTORY OF STROKE: ICD-10-CM

## 2022-01-11 DIAGNOSIS — I50.42 CHRONIC COMBINED SYSTOLIC AND DIASTOLIC HEART FAILURE: ICD-10-CM

## 2022-01-11 DIAGNOSIS — G40.909 SEIZURE DISORDER: ICD-10-CM

## 2022-01-11 DIAGNOSIS — E11.69 TYPE 2 DIABETES MELLITUS WITH OTHER SPECIFIED COMPLICATION, UNSPECIFIED WHETHER LONG TERM INSULIN USE: ICD-10-CM

## 2022-01-11 DIAGNOSIS — I10 PRIMARY HYPERTENSION: ICD-10-CM

## 2022-01-11 DIAGNOSIS — I50.22 CHRONIC HFREF (HEART FAILURE WITH REDUCED EJECTION FRACTION): Primary | ICD-10-CM

## 2022-01-11 PROCEDURE — 3051F PR MOST RECENT HEMOGLOBIN A1C LEVEL 7.0 - < 8.0%: ICD-10-PCS | Mod: CPTII,S$GLB,, | Performed by: NURSE PRACTITIONER

## 2022-01-11 PROCEDURE — 99999 PR PBB SHADOW E&M-EST. PATIENT-LVL IV: CPT | Mod: PBBFAC,,, | Performed by: NURSE PRACTITIONER

## 2022-01-11 PROCEDURE — 3080F PR MOST RECENT DIASTOLIC BLOOD PRESSURE >= 90 MM HG: ICD-10-PCS | Mod: CPTII,S$GLB,, | Performed by: NURSE PRACTITIONER

## 2022-01-11 PROCEDURE — 3077F PR MOST RECENT SYSTOLIC BLOOD PRESSURE >= 140 MM HG: ICD-10-PCS | Mod: CPTII,S$GLB,, | Performed by: NURSE PRACTITIONER

## 2022-01-11 PROCEDURE — 3008F BODY MASS INDEX DOCD: CPT | Mod: CPTII,S$GLB,, | Performed by: NURSE PRACTITIONER

## 2022-01-11 PROCEDURE — 99215 OFFICE O/P EST HI 40 MIN: CPT | Mod: S$GLB,,, | Performed by: NURSE PRACTITIONER

## 2022-01-11 PROCEDURE — 3080F DIAST BP >= 90 MM HG: CPT | Mod: CPTII,S$GLB,, | Performed by: NURSE PRACTITIONER

## 2022-01-11 PROCEDURE — 99999 PR PBB SHADOW E&M-EST. PATIENT-LVL IV: ICD-10-PCS | Mod: PBBFAC,,, | Performed by: NURSE PRACTITIONER

## 2022-01-11 PROCEDURE — 99215 PR OFFICE/OUTPT VISIT, EST, LEVL V, 40-54 MIN: ICD-10-PCS | Mod: S$GLB,,, | Performed by: NURSE PRACTITIONER

## 2022-01-11 PROCEDURE — 3077F SYST BP >= 140 MM HG: CPT | Mod: CPTII,S$GLB,, | Performed by: NURSE PRACTITIONER

## 2022-01-11 PROCEDURE — 1159F MED LIST DOCD IN RCRD: CPT | Mod: CPTII,S$GLB,, | Performed by: NURSE PRACTITIONER

## 2022-01-11 PROCEDURE — 3008F PR BODY MASS INDEX (BMI) DOCUMENTED: ICD-10-PCS | Mod: CPTII,S$GLB,, | Performed by: NURSE PRACTITIONER

## 2022-01-11 PROCEDURE — 1159F PR MEDICATION LIST DOCUMENTED IN MEDICAL RECORD: ICD-10-PCS | Mod: CPTII,S$GLB,, | Performed by: NURSE PRACTITIONER

## 2022-01-11 PROCEDURE — 3051F HG A1C>EQUAL 7.0%<8.0%: CPT | Mod: CPTII,S$GLB,, | Performed by: NURSE PRACTITIONER

## 2022-01-11 RX ORDER — CARVEDILOL 25 MG/1
25 TABLET ORAL 2 TIMES DAILY
Qty: 60 TABLET | Refills: 6
Start: 2022-01-11 | End: 2022-02-18 | Stop reason: SDUPTHER

## 2022-01-11 RX ORDER — ISOSORBIDE DINITRATE 10 MG/1
10 TABLET ORAL 3 TIMES DAILY
Qty: 90 TABLET | Refills: 6 | Status: SHIPPED | OUTPATIENT
Start: 2022-01-11 | End: 2022-03-29 | Stop reason: SDUPTHER

## 2022-01-11 NOTE — PROGRESS NOTES
Subjective:   Patient ID:  Vadim Devries III is a 46 y.o. male who presents for follow-up of Congestive Heart Failure    Primary Cardiologist - has seen Hong Farrell NP; has cancelled appts with Dr. Casas     Cardiac Problems  1. HFrEF; stage C, FC 3; EF 20% (11/2021)  2. HTN  3. DM   4. H/o multiple strokes  5. Seizure disorder   6. Paralysis   7. Pulmonary HTN  8. Dyslipidemia, LDL goal <100    HPI   Mr. Devries presents today for routine 1mo follow up of the problems listed above.  Patient states that lately he has become more SOB and GUAD[place]UPE.  He states he can hear gurgling in his chest and is unable to lay flat at night to sleep.  He utilizes 4 pillows and also has a dry cough at night.  He is also endorsing feeling full in his abdomen and having decreased appetite.  I do suspect he is volume overloaded today on exam, however his exam is somewhat challenging due to paralysis below his waist.  He has mild edema to BLE, but patient states that his shoes are fitting tighter.  His abdomen is firm and he has wheezing throughout lung fields.  Transportation to appointments is somewhat of an issue for him as he does not drive.  He also has some barriers when it comes to preparing meals and has been somewhat noncompliant with his sodium restriction lately.  He also needs updated labs before I can change his diuretic regimen.  His blood pressure is significantly elevated today 162/108, which may be due at least in some part to volume overload.  He has also only been taking his hydralazine BID as opposed to the prescribed TID.    Patient denies new or worsening chest pain/tightness, PND, palpitations, lightheadedness, dizziness, fainting, vision changes, weakness, or abnormal bleeding.     Patient states they are compliant with all medications and offers no side effects.     Review of Systems   Constitutional: Negative.   HENT: Negative.    Eyes: Negative.  Negative for blurred vision, double vision, vision loss in  left eye, vision loss in right eye, visual disturbance and visual halos.   Cardiovascular: Positive for dyspnea on exertion and leg swelling. Negative for chest pain, claudication, cyanosis, irregular heartbeat, near-syncope, orthopnea, palpitations and paroxysmal nocturnal dyspnea.   Respiratory: Positive for cough and shortness of breath. Negative for hemoptysis, sleep disturbances due to breathing and wheezing.    Endocrine: Negative.    Hematologic/Lymphatic: Negative.  Negative for bleeding problem. Does not bruise/bleed easily.   Skin: Negative.  Negative for nail changes.   Musculoskeletal: Negative.  Negative for falls, muscle cramps, muscle weakness and myalgias.   Gastrointestinal: Positive for bloating. Negative for abdominal pain, anorexia, hematemesis, hematochezia, melena, nausea and vomiting.   Genitourinary: Negative.  Negative for dysuria, flank pain and hematuria.   Neurological: Negative.  Negative for brief paralysis, dizziness, focal weakness, headaches, light-headedness, numbness, paresthesias and weakness.   Psychiatric/Behavioral: Negative.    Allergic/Immunologic: Negative.        Objective:   Physical Exam  Vitals and nursing note reviewed.   Constitutional:       General: He is not in acute distress.     Appearance: Normal appearance. He is not ill-appearing.   HENT:      Head: Normocephalic and atraumatic.   Eyes:      Extraocular Movements: Extraocular movements intact.      Pupils: Pupils are equal, round, and reactive to light.   Neck:      Vascular: No carotid bruit.   Cardiovascular:      Rate and Rhythm: Normal rate and regular rhythm.      Pulses: Normal pulses.      Heart sounds: Normal heart sounds. No murmur heard.      Pulmonary:      Effort: Pulmonary effort is normal.      Breath sounds: Examination of the right-upper field reveals wheezing. Examination of the left-upper field reveals wheezing. Examination of the right-middle field reveals wheezing. Examination of the  left-middle field reveals wheezing. Examination of the right-lower field reveals wheezing. Examination of the left-lower field reveals wheezing. Decreased breath sounds and wheezing present.   Abdominal:      General: Bowel sounds are normal. There is no distension.      Palpations: Abdomen is soft.      Tenderness: There is no abdominal tenderness.   Musculoskeletal:         General: No swelling or tenderness. Normal range of motion.      Cervical back: Normal range of motion and neck supple. No tenderness.      Right lower le+ Edema present.      Left lower le+ Edema present.      Comments: Patient paralyzed from waist down    Lymphadenopathy:      Cervical: No cervical adenopathy.   Skin:     General: Skin is warm and dry.      Capillary Refill: Capillary refill takes less than 2 seconds.   Neurological:      General: No focal deficit present.      Mental Status: He is alert and oriented to person, place, and time. Mental status is at baseline.   Psychiatric:         Mood and Affect: Mood normal.         Behavior: Behavior normal.         Thought Content: Thought content normal.         Judgment: Judgment normal.         Assessment:     1. Chronic HFrEF (heart failure with reduced ejection fraction)    2. Primary hypertension    3. Type 2 diabetes mellitus with other specified complication, unspecified whether long term insulin use    4. History of stroke    5. Seizure disorder    6. Paralysis    7. Pulmonary HTN              Labs/Imaging Data Reviewed:  Results for TERRA BARRERA III (MRN [id number]) as of 2022 11:50   Ref. Range 2021 14:18   Sodium Latest Ref Range: 136 - 145 mmol/L 147 (H)   Potassium Latest Ref Range: 3.5 - 5.1 mmol/L 3.8   Chloride Latest Ref Range: 95 - 110 mmol/L 108   CO2 Latest Ref Range: 23 - 29 mmol/L 27   Anion Gap Latest Ref Range: 8 - 16 mmol/L 12   BUN Latest Ref Range: 6 - 20 mg/dL 18   Creatinine Latest Ref Range: 0.5 - 1.4 mg/dL 1.4   eGFR if non African  American Latest Ref Range: >60 mL/min/1.73 m^2 59 (A)   eGFR if African American Latest Ref Range: >60 mL/min/1.73 m^2 >60   Glucose Latest Ref Range: 70 - 110 mg/dL 111 (H)   Calcium Latest Ref Range: 8.7 - 10.5 mg/dL 9.1   NT-proBNP Latest Ref Range: <=56 pg/mL 2799 (H)     1. Nuclear Stress Test 11/30/2021  Conclusion    Normal myocardial perfusion scan. There is no evidence of myocardial ischemia or infarction.    There is mild apical thinning which is a normal variant.    The gated perfusion images showed an ejection fraction of 22% at rest. The gated perfusion images showed an ejection fraction of 22% post stress. Normal ejection fraction is greater than 59%.    There is severe global hypokinesis at rest and stress.    The EKG portion of this study is negative for ischemia.    2. TTE 11/27/2021  Summary   · The left ventricle is normal in size with concentric hypertrophy and severely decreased systolic function.  · Grade I left ventricular diastolic dysfunction.  · Normal right ventricular size with normal right ventricular systolic function.  · The estimated ejection fraction is 20%.  · There is left ventricular global hypokinesis.  · The estimated PA systolic pressure is 44 mmHg.  · Elevated central venous pressure (15 mmHg).  · There is pulmonary hypertension.  · Mild tricuspid regurgitation.  · Mild pulmonic regurgitation.  · Mild mitral regurgitation.  · Trivial pericardial effusion. Effusion is fibrinous.  · The sinuses of Valsalva is dilated.  · There are bilateral pleural effusion.        Plan:   1. Chronic HFrEF (heart failure with reduced ejection fraction)  - pt appears acutely volume overloaded on exam  - will have pt take extra lasix tonight (80mg) and get BMP today; will make further diuretic recommendations once labs are back  - increase Coreg to 25mg BID  - increase Entresto to 97-103mg BID   - pt unable to weigh daily d/t being paralyzed   - 2L daily (64oz) fluid restriction  - 2000mg  daily sodium restriction  - instructed patient to call if weight gain 2-3lbs overnight or 5lbs in one week  - instructed patient to call if they develop worsening shortness of breath, increased leg swelling, increased abdominal bloating, or shortness of breath when you are sleeping      2. Primary hypertension  - BP elevated today 162/108  - has only been taking hydralazine BID  - instructed patient to please take hydralazine TID and Isordil TID  - increasing Coreg and Entresto should help to lower BP  - may also be related to volume overload and diuresing should help some  - BP cuff given to pt today     3. Type 2 diabetes mellitus with other specified complication, unspecified whether long term insulin use  - stable   - last A1C 7.3 (11/2021)  - Metformin    4. History of stroke  - stable  - continue simvastatin 10mg daily     5. Seizure disorder  - pt taking Dilantin     6. Paralysis  - stable  - difficult to assess weight and volume status d/t paralysis     7. Pulmonary HTN  - stable on last echo 11/2021    8. Dyslipidemia, LDL goal <100  - last .8 (11/2021)  - continue simvastatin 10mg daily       CHF SYNOPSIS:    GDMT:  ACE/ARB/ARNI: Increase Entresto to 97-103mg BID  Beta Blocker: increase Coreg to 25mg BID  MRA: none; add moving forward  SGLT2: none; add moving forward   Diuretic: continue lasix 40mg BID; consider switching to torsemide      Last BMP: 12/9/21  Next BMP Due: TODAY    Last BNP: 12/9/21  Next BNP Due: TODAY    Last Echo: 11/30/21  Repeat Echo Due: 3-6mo after optimization complete; actively titrating             Comfort Abernathy NP  [facility] [place]    This note was created using [employer] voice recognition software that occasionally misinterpreted phrases or words. Please excuse any grammatical errors.

## 2022-01-11 NOTE — PATIENT INSTRUCTIONS
- take an extra lasix tonight and I will call you tomorrow with further recommendations when your labs come back  - increase Carvedilol to 25mg (2 tabs) twice daily  - increase Entresto to 97-103mg (2 tabs) twice daily   - make sure to take both the hydralazine and Isosorbide 3 times day!!!

## 2022-01-12 ENCOUNTER — TELEPHONE (OUTPATIENT)
Dept: CARDIOLOGY | Facility: HOSPITAL | Age: 47
End: 2022-01-12
Payer: COMMERCIAL

## 2022-01-12 DIAGNOSIS — I50.42 CHRONIC COMBINED SYSTOLIC AND DIASTOLIC HEART FAILURE: Primary | ICD-10-CM

## 2022-01-12 RX ORDER — POTASSIUM CHLORIDE 20 MEQ/1
20 TABLET, EXTENDED RELEASE ORAL ONCE
Qty: 30 TABLET | Refills: 3 | Status: SHIPPED | OUTPATIENT
Start: 2022-01-12 | End: 2022-01-18 | Stop reason: SDUPTHER

## 2022-01-12 RX ORDER — TORSEMIDE 20 MG/1
TABLET ORAL
Qty: 90 TABLET | Refills: 3 | Status: SHIPPED | OUTPATIENT
Start: 2022-01-12 | End: 2022-01-12

## 2022-01-12 RX ORDER — TORSEMIDE 20 MG/1
40 TABLET ORAL DAILY
Qty: 90 TABLET | Refills: 3 | Status: SHIPPED | OUTPATIENT
Start: 2022-01-12 | End: 2022-01-18 | Stop reason: SDUPTHER

## 2022-01-18 ENCOUNTER — TELEPHONE (OUTPATIENT)
Dept: TRANSPLANT | Facility: CLINIC | Age: 47
End: 2022-01-18
Payer: COMMERCIAL

## 2022-01-18 DIAGNOSIS — I50.42 CHRONIC COMBINED SYSTOLIC AND DIASTOLIC HEART FAILURE: Primary | ICD-10-CM

## 2022-01-18 RX ORDER — TORSEMIDE 20 MG/1
40 TABLET ORAL DAILY
Qty: 90 TABLET | Refills: 3 | Status: SHIPPED | OUTPATIENT
Start: 2022-01-18 | End: 2022-01-31

## 2022-01-18 RX ORDER — POTASSIUM CHLORIDE 20 MEQ/1
20 TABLET, EXTENDED RELEASE ORAL ONCE
Qty: 30 TABLET | Refills: 3 | Status: SHIPPED | OUTPATIENT
Start: 2022-01-18 | End: 2022-01-18

## 2022-01-18 NOTE — TELEPHONE ENCOUNTER
See nursing note below.  Unfortunately, patient has still not been able to  his torsemide that I ordered on 1/12/22 and has continued to get worse symptomatically.  I offered to have him come into the office today and receive IV lasix, but he is unable to come in d/t transportation issues.  We have instructed him to start taking 80mg lasix BID until he can get his torsemide.    911/ED with worsening symptoms or emergencies

## 2022-01-18 NOTE — TELEPHONE ENCOUNTER
Pt offered today by provider to come in for IV lasix. Pt says he can not make it in.     Provider notified     Pt has taken total of lasix 80 mg this AM    Orders  1. Take lasix 80 mg now  2.  torsemide tomorrow if he does not end up at Er  VORB//MARIA LUISA Abernathy NP//MARIA LUISA Swartz LPN

## 2022-01-18 NOTE — TELEPHONE ENCOUNTER
----- Message from Comfort Swartz LPN sent at 1/12/2022  3:25 PM CST -----  Progress check on torsemide

## 2022-01-18 NOTE — TELEPHONE ENCOUNTER
Pt reports he is feeling bad today. Says his prescriptions were sent to the wrong pharmacy and so he didn't get them.   Thought he was doing ok towards the end of last week because the swelling in his feet was going down.   However, now he says he is having loss of appetite, feeling full without eating anything and very short of breath that seems to be progressing.   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     Has been taking lasix until he was to go  the torsemide to then switch over.     Please advise if any further recommendations?.

## 2022-01-19 LAB
ACID FAST MOD KINY STN SPEC: NORMAL
MYCOBACTERIUM SPEC QL CULT: NORMAL

## 2022-01-28 ENCOUNTER — LAB VISIT (OUTPATIENT)
Dept: LAB | Facility: HOSPITAL | Age: 47
End: 2022-01-28
Attending: FAMILY MEDICINE
Payer: COMMERCIAL

## 2022-01-28 ENCOUNTER — OFFICE VISIT (OUTPATIENT)
Dept: TRANSPLANT | Facility: CLINIC | Age: 47
End: 2022-01-28
Payer: COMMERCIAL

## 2022-01-28 VITALS
HEART RATE: 89 BPM | DIASTOLIC BLOOD PRESSURE: 110 MMHG | SYSTOLIC BLOOD PRESSURE: 160 MMHG | HEIGHT: 72 IN | BODY MASS INDEX: 29.6 KG/M2 | OXYGEN SATURATION: 99 %

## 2022-01-28 DIAGNOSIS — G83.9 PARALYSIS: ICD-10-CM

## 2022-01-28 DIAGNOSIS — G40.909 SEIZURE DISORDER: ICD-10-CM

## 2022-01-28 DIAGNOSIS — E78.5 DYSLIPIDEMIA, GOAL LDL BELOW 100: ICD-10-CM

## 2022-01-28 DIAGNOSIS — I50.42 CHRONIC COMBINED SYSTOLIC AND DIASTOLIC HEART FAILURE: ICD-10-CM

## 2022-01-28 DIAGNOSIS — I27.20 PULMONARY HTN: ICD-10-CM

## 2022-01-28 DIAGNOSIS — E11.69 TYPE 2 DIABETES MELLITUS WITH OTHER SPECIFIED COMPLICATION, UNSPECIFIED WHETHER LONG TERM INSULIN USE: ICD-10-CM

## 2022-01-28 DIAGNOSIS — Z86.73 HISTORY OF STROKE: ICD-10-CM

## 2022-01-28 DIAGNOSIS — I50.20 HFREF (HEART FAILURE WITH REDUCED EJECTION FRACTION): Primary | ICD-10-CM

## 2022-01-28 LAB
ANION GAP SERPL CALC-SCNC: 11 MMOL/L (ref 8–16)
BUN SERPL-MCNC: 21 MG/DL (ref 6–20)
CALCIUM SERPL-MCNC: 9.4 MG/DL (ref 8.7–10.5)
CHLORIDE SERPL-SCNC: 104 MMOL/L (ref 95–110)
CO2 SERPL-SCNC: 27 MMOL/L (ref 23–29)
CREAT SERPL-MCNC: 1.3 MG/DL (ref 0.5–1.4)
EST. GFR  (AFRICAN AMERICAN): >60 ML/MIN/1.73 M^2
EST. GFR  (NON AFRICAN AMERICAN): >60 ML/MIN/1.73 M^2
GLUCOSE SERPL-MCNC: 153 MG/DL (ref 70–110)
POTASSIUM SERPL-SCNC: 4 MMOL/L (ref 3.5–5.1)
SODIUM SERPL-SCNC: 142 MMOL/L (ref 136–145)

## 2022-01-28 PROCEDURE — 36415 COLL VENOUS BLD VENIPUNCTURE: CPT | Performed by: NURSE PRACTITIONER

## 2022-01-28 PROCEDURE — 1159F PR MEDICATION LIST DOCUMENTED IN MEDICAL RECORD: ICD-10-PCS | Mod: CPTII,S$GLB,, | Performed by: NURSE PRACTITIONER

## 2022-01-28 PROCEDURE — 99214 PR OFFICE/OUTPT VISIT, EST, LEVL IV, 30-39 MIN: ICD-10-PCS | Mod: S$GLB,,, | Performed by: NURSE PRACTITIONER

## 2022-01-28 PROCEDURE — 3051F PR MOST RECENT HEMOGLOBIN A1C LEVEL 7.0 - < 8.0%: ICD-10-PCS | Mod: CPTII,S$GLB,, | Performed by: NURSE PRACTITIONER

## 2022-01-28 PROCEDURE — 1160F PR REVIEW ALL MEDS BY PRESCRIBER/CLIN PHARMACIST DOCUMENTED: ICD-10-PCS | Mod: CPTII,S$GLB,, | Performed by: NURSE PRACTITIONER

## 2022-01-28 PROCEDURE — 3080F DIAST BP >= 90 MM HG: CPT | Mod: CPTII,S$GLB,, | Performed by: NURSE PRACTITIONER

## 2022-01-28 PROCEDURE — 80048 BASIC METABOLIC PNL TOTAL CA: CPT | Performed by: NURSE PRACTITIONER

## 2022-01-28 PROCEDURE — 1159F MED LIST DOCD IN RCRD: CPT | Mod: CPTII,S$GLB,, | Performed by: NURSE PRACTITIONER

## 2022-01-28 PROCEDURE — 4010F PR ACE/ARB THEARPY RXD/TAKEN: ICD-10-PCS | Mod: CPTII,S$GLB,, | Performed by: NURSE PRACTITIONER

## 2022-01-28 PROCEDURE — 99999 PR PBB SHADOW E&M-EST. PATIENT-LVL V: ICD-10-PCS | Mod: PBBFAC,,, | Performed by: NURSE PRACTITIONER

## 2022-01-28 PROCEDURE — 3051F HG A1C>EQUAL 7.0%<8.0%: CPT | Mod: CPTII,S$GLB,, | Performed by: NURSE PRACTITIONER

## 2022-01-28 PROCEDURE — 99999 PR PBB SHADOW E&M-EST. PATIENT-LVL V: CPT | Mod: PBBFAC,,, | Performed by: NURSE PRACTITIONER

## 2022-01-28 PROCEDURE — 1160F RVW MEDS BY RX/DR IN RCRD: CPT | Mod: CPTII,S$GLB,, | Performed by: NURSE PRACTITIONER

## 2022-01-28 PROCEDURE — 3077F PR MOST RECENT SYSTOLIC BLOOD PRESSURE >= 140 MM HG: ICD-10-PCS | Mod: CPTII,S$GLB,, | Performed by: NURSE PRACTITIONER

## 2022-01-28 PROCEDURE — 3080F PR MOST RECENT DIASTOLIC BLOOD PRESSURE >= 90 MM HG: ICD-10-PCS | Mod: CPTII,S$GLB,, | Performed by: NURSE PRACTITIONER

## 2022-01-28 PROCEDURE — 3008F BODY MASS INDEX DOCD: CPT | Mod: CPTII,S$GLB,, | Performed by: NURSE PRACTITIONER

## 2022-01-28 PROCEDURE — 99214 OFFICE O/P EST MOD 30 MIN: CPT | Mod: S$GLB,,, | Performed by: NURSE PRACTITIONER

## 2022-01-28 PROCEDURE — 3008F PR BODY MASS INDEX (BMI) DOCUMENTED: ICD-10-PCS | Mod: CPTII,S$GLB,, | Performed by: NURSE PRACTITIONER

## 2022-01-28 PROCEDURE — 3077F SYST BP >= 140 MM HG: CPT | Mod: CPTII,S$GLB,, | Performed by: NURSE PRACTITIONER

## 2022-01-28 PROCEDURE — 4010F ACE/ARB THERAPY RXD/TAKEN: CPT | Mod: CPTII,S$GLB,, | Performed by: NURSE PRACTITIONER

## 2022-01-28 RX ORDER — HYDRALAZINE HYDROCHLORIDE 25 MG/1
25 TABLET, FILM COATED ORAL 3 TIMES DAILY
Qty: 90 TABLET | Refills: 6 | Status: SHIPPED | OUTPATIENT
Start: 2022-01-28 | End: 2022-02-17 | Stop reason: SDUPTHER

## 2022-01-28 NOTE — PROGRESS NOTES
Subjective:   Patient ID:  Vadim Devries III is a 46 y.o. male who presents for follow-up of Chronic HFrEF (heart failure with reduced ejection fraction)    Primary Cardiologist - has seen Hong Farrell NP; has cancelled appts with Dr. Casas      Cardiac Problems  1. Acute on chronic combined systolic and diastolic heart failrue; HFrEF; stage C, FC 3; EF 20% (11/2021)  2. HTN  3. DM   4. H/o multiple strokes  5. Seizure disorder   6. Paralysis   7. Pulmonary HTN  8. Dyslipidemia, LDL goal <100    HPI   Mr. Devries presents today for routine 2wk follow up of the problems listed above. Overall, the patient is feeling much better than the last time I saw him. He states that since switching to torsemide, he is less SOB and his appetite has returned. Again, we are not able to weigh him because he is paralyzed from the waist down. He no longer has BLE edema and his abd is soft. Lung sounds are diminished throughout lung fields but no longer w/wheezing. His BP is elevated again today 160/110. He states he has been taking his BP at home with the cuff we gave him and it is usually SBP 200s. He states last week he was having intermittent chest pain, which may have been related to volume overload vs. HTN. He had a negative nuc stress test in Nov 2021. He is still sleeping with 4 pillows. I do believe he is still volume overload. Will continue to titrate diuretics and HF GDMT as tolerated. BMP from today pending.    Patient denies new or worsening palpitations, lightheadedness, dizziness, fainting, vision changes, weakness, or abnormal bleeding.     Patient states they are compliant with all medications and offers no side effects.     Review of Systems   Constitutional: Negative.   HENT: Negative.    Eyes: Negative.  Negative for blurred vision, double vision, vision loss in left eye, vision loss in right eye, visual disturbance and visual halos.   Cardiovascular: Positive for dyspnea on exertion and orthopnea.  Negative for chest pain, claudication, cyanosis, irregular heartbeat, leg swelling, near-syncope, palpitations and paroxysmal nocturnal dyspnea.   Respiratory: Positive for cough and shortness of breath. Negative for hemoptysis, sleep disturbances due to breathing and wheezing.    Endocrine: Negative.    Hematologic/Lymphatic: Negative.  Negative for bleeding problem. Does not bruise/bleed easily.   Skin: Negative.  Negative for nail changes.   Musculoskeletal: Negative.  Negative for falls, muscle cramps, muscle weakness and myalgias.   Gastrointestinal: Negative.  Negative for bloating, abdominal pain, anorexia, hematemesis, hematochezia, melena, nausea and vomiting.   Genitourinary: Negative.  Negative for dysuria, flank pain and hematuria.   Neurological: Negative.  Negative for brief paralysis, dizziness, focal weakness, headaches, light-headedness, numbness, paresthesias and weakness.   Psychiatric/Behavioral: Negative.    Allergic/Immunologic: Negative.        Objective:   Physical Exam  Vitals and nursing note reviewed.   Constitutional:       General: He is not in acute distress.     Appearance: Normal appearance. He is not ill-appearing.   HENT:      Head: Normocephalic and atraumatic.   Eyes:      Extraocular Movements: Extraocular movements intact.      Pupils: Pupils are equal, round, and reactive to light.   Neck:      Vascular: No carotid bruit.   Cardiovascular:      Rate and Rhythm: Normal rate and regular rhythm.      Pulses: Normal pulses.      Heart sounds: Normal heart sounds. No murmur heard.      Pulmonary:      Effort: Pulmonary effort is normal.      Breath sounds: Decreased breath sounds present.   Abdominal:      General: Bowel sounds are normal. There is no distension.      Palpations: Abdomen is soft.      Tenderness: There is no abdominal tenderness.   Musculoskeletal:         General: No swelling or tenderness. Normal range of motion.      Cervical back: Normal range of motion and neck  supple. No tenderness.      Right lower leg: No edema.      Left lower leg: No edema.   Lymphadenopathy:      Cervical: No cervical adenopathy.   Skin:     General: Skin is warm and dry.      Capillary Refill: Capillary refill takes less than 2 seconds.   Neurological:      General: No focal deficit present.      Mental Status: He is alert and oriented to person, place, and time. Mental status is at baseline.   Psychiatric:         Mood and Affect: Mood normal.         Behavior: Behavior normal.         Thought Content: Thought content normal.         Judgment: Judgment normal.       BP (!) 160/110 (BP Location: Left arm, Patient Position: Sitting)   Pulse 89   Ht 6' (1.829 m)   SpO2 99%   BMI 29.60 kg/m²     Assessment:     1. HFrEF (heart failure with reduced ejection fraction)    2. Chronic combined systolic and diastolic heart failure    3. Type 2 diabetes mellitus with other specified complication, unspecified whether long term insulin use    4. History of stroke    5. Seizure disorder    6. Paralysis    7. Pulmonary HTN    8. Dyslipidemia, goal LDL below 100      Labs/Imaging Data Reviewed:  Results for TERRA BARRERA III (MRN [id number]) as of 1/28/2022 13:20   Ref. Range 1/11/2022 15:15   Sodium Latest Ref Range: 136 - 145 mmol/L 138   Potassium Latest Ref Range: 3.5 - 5.1 mmol/L 3.8   Chloride Latest Ref Range: 95 - 110 mmol/L 104   CO2 Latest Ref Range: 23 - 29 mmol/L 26   Anion Gap Latest Ref Range: 8 - 16 mmol/L 8   BUN Latest Ref Range: 6 - 20 mg/dL 19   Creatinine Latest Ref Range: 0.5 - 1.4 mg/dL 1.2   eGFR if non African American Latest Ref Range: >60 mL/min/1.73 m^2 >60.0   eGFR if African American Latest Ref Range: >60 mL/min/1.73 m^2 >60.0   Glucose Latest Ref Range: 70 - 110 mg/dL 223 (H)   Calcium Latest Ref Range: 8.7 - 10.5 mg/dL 8.8   NT-proBNP Latest Ref Range: <=56 pg/mL 4276 (H)     1. Nuclear Stress Test 11/30/2021  Conclusion    Normal myocardial perfusion scan. There is no  evidence of myocardial ischemia or infarction.    There is mild apical thinning which is a normal variant.    The gated perfusion images showed an ejection fraction of 22% at rest. The gated perfusion images showed an ejection fraction of 22% post stress. Normal ejection fraction is greater than 59%.    There is severe global hypokinesis at rest and stress.    The EKG portion of this study is negative for ischemia.     2. TTE 11/27/2021  Summary   · The left ventricle is normal in size with concentric hypertrophy and severely decreased systolic function.  · Grade I left ventricular diastolic dysfunction.  · Normal right ventricular size with normal right ventricular systolic function.  · The estimated ejection fraction is 20%.  · There is left ventricular global hypokinesis.  · The estimated PA systolic pressure is 44 mmHg.  · Elevated central venous pressure (15 mmHg).  · There is pulmonary hypertension.  · Mild tricuspid regurgitation.  · Mild pulmonic regurgitation.  · Mild mitral regurgitation.  · Trivial pericardial effusion. Effusion is fibrinous.  · The sinuses of Valsalva is dilated.  · There are bilateral pleural effusion        Plan:     1. Chronic HFrEF (heart failure with reduced ejection fraction)  - pt appears much improved since last visit, although still somewhat volume overloaded  - continue torsemide 40mg daily; may be able to increase pending labs from today  - continue Coreg to 25mg BID  - continue Entresto to 97-103mg BID   - pt unable to weigh daily d/t being paralyzed   - 2L daily (64oz) fluid restriction  - 2000mg daily sodium restriction  - instructed patient to call if they develop worsening shortness of breath, increased leg swelling, increased abdominal bloating, or shortness of breath when you are sleeping       2. Primary hypertension  - BP elevated today 160/110  - increase hydralazine to 25mg TID  - continue Isordil 10mg TID  - pt states home Bps have been SBP 200s, so this is  somewhat improved  - will continue to titrate BP meds      3. Type 2 diabetes mellitus with other specified complication, unspecified whether long term insulin use  - stable   - last A1C 7.3 (11/2021)  - Metformin     4. History of stroke  - stable  - continue simvastatin 10mg daily      5. Seizure disorder  - pt taking Dilantin      6. Paralysis  - stable  - difficult to assess weight and volume status d/t paralysis      7. Pulmonary HTN  - stable on last echo 11/2021     8. Dyslipidemia, LDL goal <100  - last .8 (11/2021)  - continue simvastatin 10mg daily         CHF SYNOPSIS:     GDMT:  ACE/ARB/ARNI:  continue Entresto to 97-103mg BID  Beta Blocker: continue Coreg to 25mg BID  MRA: none; add moving forward  SGLT2: none; add moving forward   Diuretic: torsemide 40mg daily      Last BMP: 1/11/21  Next BMP Due: TODAY     Last BNP: 1/11/21  Next BNP Due: TODAY     Last Echo: 11/30/21  Repeat Echo Due: 3-6mo after optimization complete; actively titrating        Comfort Abernathy NP  [facility] [place]    This note was created using [employer] voice recognition software that occasionally misinterpreted phrases or words. Please excuse any grammatical errors.

## 2022-01-31 ENCOUNTER — TELEPHONE (OUTPATIENT)
Dept: TRANSPLANT | Facility: CLINIC | Age: 47
End: 2022-01-31
Payer: COMMERCIAL

## 2022-01-31 DIAGNOSIS — I50.42 CHRONIC COMBINED SYSTOLIC AND DIASTOLIC HEART FAILURE: ICD-10-CM

## 2022-01-31 RX ORDER — TORSEMIDE 20 MG/1
40 TABLET ORAL 2 TIMES DAILY
Qty: 120 TABLET | Refills: 11
Start: 2022-01-31 | End: 2022-02-17 | Stop reason: SDUPTHER

## 2022-01-31 NOTE — TELEPHONE ENCOUNTER
Renal function stable  Can increase torsemide to 40mg BID  Will need labs Friday if possible    How are his Bps? I had increased his hydralazine to 25mg TID

## 2022-02-02 ENCOUNTER — PATIENT MESSAGE (OUTPATIENT)
Dept: TRANSPLANT | Facility: CLINIC | Age: 47
End: 2022-02-02
Payer: COMMERCIAL

## 2022-02-04 NOTE — TELEPHONE ENCOUNTER
Received call and spoke with pt.   Instructed pt to increase torsemide 40 mg BID per Comfort Abernathy NP.     Pt informed still coughing. Pt informed also still having SOB, 4 pillow orthopnea, and slight edema but all of these symptoms have improved.   Denies CP or PND.    BP last days: 175/126 and 163/118, asymptomatic,  pt says he feels great and these are better readings than he used to have.     Pt informed has not started increased dose of hydralazine per last office visit, needs to  from pharmacy.  Requested pt  hydralazine ASAP and begin taking to help with elevated BP.     Scheduled labs with next f/u appt on 2/11/2022.

## 2022-02-08 ENCOUNTER — TELEPHONE (OUTPATIENT)
Dept: ADMINISTRATIVE | Facility: HOSPITAL | Age: 47
End: 2022-02-08
Payer: COMMERCIAL

## 2022-02-17 ENCOUNTER — HOSPITAL ENCOUNTER (EMERGENCY)
Facility: HOSPITAL | Age: 47
Discharge: HOME | End: 2022-02-17
Attending: EMERGENCY MEDICINE
Payer: COMMERCIAL

## 2022-02-17 ENCOUNTER — TELEPHONE (OUTPATIENT)
Dept: TRANSPLANT | Facility: CLINIC | Age: 47
End: 2022-02-17
Payer: COMMERCIAL

## 2022-02-17 ENCOUNTER — TELEPHONE (OUTPATIENT)
Dept: CARDIOLOGY | Facility: CLINIC | Age: 47
End: 2022-02-17
Payer: COMMERCIAL

## 2022-02-17 VITALS
WEIGHT: 209 LBS | OXYGEN SATURATION: 100 % | SYSTOLIC BLOOD PRESSURE: 179 MMHG | DIASTOLIC BLOOD PRESSURE: 91 MMHG | TEMPERATURE: 98 F | RESPIRATION RATE: 18 BRPM | HEART RATE: 72 BPM | BODY MASS INDEX: 28.34 KG/M2

## 2022-02-17 DIAGNOSIS — I10 HTN (HYPERTENSION): ICD-10-CM

## 2022-02-17 DIAGNOSIS — G83.9 PARALYSIS: Primary | ICD-10-CM

## 2022-02-17 DIAGNOSIS — I50.42 CHRONIC COMBINED SYSTOLIC AND DIASTOLIC HEART FAILURE: ICD-10-CM

## 2022-02-17 DIAGNOSIS — I50.22 CHRONIC HFREF (HEART FAILURE WITH REDUCED EJECTION FRACTION): ICD-10-CM

## 2022-02-17 DIAGNOSIS — I50.20 HFREF (HEART FAILURE WITH REDUCED EJECTION FRACTION): ICD-10-CM

## 2022-02-17 DIAGNOSIS — G40.909 SEIZURE DISORDER: Primary | ICD-10-CM

## 2022-02-17 LAB
ALBUMIN SERPL BCP-MCNC: 3.8 G/DL (ref 3.5–5.2)
ALP SERPL-CCNC: 125 U/L (ref 55–135)
ALT SERPL W/O P-5'-P-CCNC: 12 U/L (ref 10–44)
ANION GAP SERPL CALC-SCNC: 10 MMOL/L (ref 8–16)
AST SERPL-CCNC: 20 U/L (ref 10–40)
BACTERIA #/AREA URNS HPF: ABNORMAL /HPF
BASOPHILS # BLD AUTO: 0.04 K/UL (ref 0–0.2)
BASOPHILS NFR BLD: 0.5 % (ref 0–1.9)
BILIRUB SERPL-MCNC: 0.5 MG/DL (ref 0.1–1)
BILIRUB UR QL STRIP: NEGATIVE
BNP SERPL-MCNC: 250 PG/ML (ref 0–99)
BUN SERPL-MCNC: 25 MG/DL (ref 6–20)
CALCIUM SERPL-MCNC: 9.3 MG/DL (ref 8.7–10.5)
CHLORIDE SERPL-SCNC: 105 MMOL/L (ref 95–110)
CK SERPL-CCNC: 193 U/L (ref 20–200)
CLARITY UR: CLEAR
CO2 SERPL-SCNC: 28 MMOL/L (ref 23–29)
COLOR UR: YELLOW
CREAT SERPL-MCNC: 1.4 MG/DL (ref 0.5–1.4)
DIFFERENTIAL METHOD: ABNORMAL
EOSINOPHIL # BLD AUTO: 0.1 K/UL (ref 0–0.5)
EOSINOPHIL NFR BLD: 1.6 % (ref 0–8)
ERYTHROCYTE [DISTWIDTH] IN BLOOD BY AUTOMATED COUNT: 15.1 % (ref 11.5–14.5)
EST. GFR  (AFRICAN AMERICAN): >60 ML/MIN/1.73 M^2
EST. GFR  (NON AFRICAN AMERICAN): 59 ML/MIN/1.73 M^2
GLUCOSE SERPL-MCNC: 188 MG/DL (ref 70–110)
GLUCOSE UR QL STRIP: NEGATIVE
HCT VFR BLD AUTO: 43.4 % (ref 40–54)
HGB BLD-MCNC: 13.7 G/DL (ref 14–18)
HGB UR QL STRIP: ABNORMAL
HYALINE CASTS #/AREA URNS LPF: 0 /LPF
IMM GRANULOCYTES # BLD AUTO: 0.02 K/UL (ref 0–0.04)
IMM GRANULOCYTES NFR BLD AUTO: 0.3 % (ref 0–0.5)
KETONES UR QL STRIP: NEGATIVE
LEUKOCYTE ESTERASE UR QL STRIP: NEGATIVE
LYMPHOCYTES # BLD AUTO: 2 K/UL (ref 1–4.8)
LYMPHOCYTES NFR BLD: 26.1 % (ref 18–48)
MCH RBC QN AUTO: 24.8 PG (ref 27–31)
MCHC RBC AUTO-ENTMCNC: 31.6 G/DL (ref 32–36)
MCV RBC AUTO: 79 FL (ref 82–98)
MICROSCOPIC COMMENT: ABNORMAL
MONOCYTES # BLD AUTO: 0.7 K/UL (ref 0.3–1)
MONOCYTES NFR BLD: 8.8 % (ref 4–15)
NEUTROPHILS # BLD AUTO: 4.7 K/UL (ref 1.8–7.7)
NEUTROPHILS NFR BLD: 62.7 % (ref 38–73)
NITRITE UR QL STRIP: NEGATIVE
NRBC BLD-RTO: 0 /100 WBC
PH UR STRIP: 8 [PH] (ref 5–8)
PLATELET # BLD AUTO: 161 K/UL (ref 150–450)
PMV BLD AUTO: 11.8 FL (ref 9.2–12.9)
POTASSIUM SERPL-SCNC: 3.9 MMOL/L (ref 3.5–5.1)
PROT SERPL-MCNC: 7.7 G/DL (ref 6–8.4)
PROT UR QL STRIP: ABNORMAL
RBC # BLD AUTO: 5.52 M/UL (ref 4.6–6.2)
RBC #/AREA URNS HPF: 15 /HPF (ref 0–4)
SODIUM SERPL-SCNC: 143 MMOL/L (ref 136–145)
SP GR UR STRIP: 1.02 (ref 1–1.03)
TROPONIN I SERPL DL<=0.01 NG/ML-MCNC: 0.02 NG/ML (ref 0–0.03)
URN SPEC COLLECT METH UR: ABNORMAL
UROBILINOGEN UR STRIP-ACNC: 1 EU/DL
WBC # BLD AUTO: 7.52 K/UL (ref 3.9–12.7)
WBC #/AREA URNS HPF: 1 /HPF (ref 0–5)

## 2022-02-17 PROCEDURE — 93005 ELECTROCARDIOGRAM TRACING: CPT

## 2022-02-17 PROCEDURE — 99285 EMERGENCY DEPT VISIT HI MDM: CPT | Mod: 25

## 2022-02-17 PROCEDURE — 85025 COMPLETE CBC W/AUTO DIFF WBC: CPT | Performed by: EMERGENCY MEDICINE

## 2022-02-17 PROCEDURE — 82550 ASSAY OF CK (CPK): CPT | Performed by: EMERGENCY MEDICINE

## 2022-02-17 PROCEDURE — 81000 URINALYSIS NONAUTO W/SCOPE: CPT | Performed by: EMERGENCY MEDICINE

## 2022-02-17 PROCEDURE — 83880 ASSAY OF NATRIURETIC PEPTIDE: CPT | Performed by: EMERGENCY MEDICINE

## 2022-02-17 PROCEDURE — 63600175 PHARM REV CODE 636 W HCPCS: Performed by: EMERGENCY MEDICINE

## 2022-02-17 PROCEDURE — 96374 THER/PROPH/DIAG INJ IV PUSH: CPT

## 2022-02-17 PROCEDURE — 93010 ELECTROCARDIOGRAM REPORT: CPT | Mod: ,,, | Performed by: INTERNAL MEDICINE

## 2022-02-17 PROCEDURE — 84484 ASSAY OF TROPONIN QUANT: CPT | Performed by: EMERGENCY MEDICINE

## 2022-02-17 PROCEDURE — 93010 EKG 12-LEAD: ICD-10-PCS | Mod: ,,, | Performed by: INTERNAL MEDICINE

## 2022-02-17 PROCEDURE — 25000003 PHARM REV CODE 250: Performed by: EMERGENCY MEDICINE

## 2022-02-17 PROCEDURE — 80053 COMPREHEN METABOLIC PANEL: CPT | Performed by: EMERGENCY MEDICINE

## 2022-02-17 RX ORDER — CLONIDINE HYDROCHLORIDE 0.2 MG/1
0.2 TABLET ORAL
Status: COMPLETED | OUTPATIENT
Start: 2022-02-17 | End: 2022-02-17

## 2022-02-17 RX ORDER — HYDRALAZINE HYDROCHLORIDE 20 MG/ML
10 INJECTION INTRAMUSCULAR; INTRAVENOUS
Status: DISCONTINUED | OUTPATIENT
Start: 2022-02-17 | End: 2022-02-17 | Stop reason: HOSPADM

## 2022-02-17 RX ORDER — SPIRONOLACTONE 25 MG/1
12.5 TABLET ORAL DAILY
Qty: 15 TABLET | Refills: 0 | Status: SHIPPED | OUTPATIENT
Start: 2022-02-17 | End: 2022-02-18 | Stop reason: SDUPTHER

## 2022-02-17 RX ORDER — POTASSIUM CHLORIDE 20 MEQ/1
TABLET, EXTENDED RELEASE ORAL
COMMUNITY
Start: 2022-01-19 | End: 2023-03-08 | Stop reason: SDUPTHER

## 2022-02-17 RX ORDER — TORSEMIDE 20 MG/1
60 TABLET ORAL DAILY
Qty: 120 TABLET | Refills: 0 | Status: SHIPPED | OUTPATIENT
Start: 2022-02-17 | End: 2022-02-18 | Stop reason: SDUPTHER

## 2022-02-17 RX ORDER — HYDRALAZINE HYDROCHLORIDE 25 MG/1
50 TABLET, FILM COATED ORAL 3 TIMES DAILY
Qty: 90 TABLET | Refills: 6 | Status: SHIPPED | OUTPATIENT
Start: 2022-02-17 | End: 2022-02-18 | Stop reason: SDUPTHER

## 2022-02-17 RX ORDER — DOXEPIN HYDROCHLORIDE 25 MG/1
25 CAPSULE ORAL NIGHTLY
Qty: 30 CAPSULE | Refills: 0 | Status: SHIPPED | OUTPATIENT
Start: 2022-02-17 | End: 2022-06-22

## 2022-02-17 RX ORDER — TORSEMIDE 20 MG/1
40 TABLET ORAL 2 TIMES DAILY
Qty: 120 TABLET | Refills: 11 | Status: CANCELLED | OUTPATIENT
Start: 2022-02-17 | End: 2023-02-17

## 2022-02-17 RX ORDER — HYDRALAZINE HYDROCHLORIDE 20 MG/ML
10 INJECTION INTRAMUSCULAR; INTRAVENOUS
Status: COMPLETED | OUTPATIENT
Start: 2022-02-17 | End: 2022-02-17

## 2022-02-17 RX ORDER — CARVEDILOL 25 MG/1
25 TABLET ORAL 2 TIMES DAILY
Qty: 60 TABLET | Refills: 11 | Status: CANCELLED | OUTPATIENT
Start: 2022-02-17 | End: 2023-02-12

## 2022-02-17 RX ADMIN — HYDRALAZINE HYDROCHLORIDE 10 MG: 20 INJECTION, SOLUTION INTRAMUSCULAR; INTRAVENOUS at 12:02

## 2022-02-17 RX ADMIN — CLONIDINE HYDROCHLORIDE 0.2 MG: 0.2 TABLET ORAL at 02:02

## 2022-02-17 NOTE — ED PROVIDER NOTES
SCRIBE #1 NOTE: I, Shane Bear, am scribing for, and in the presence of, Adan Parnell MD. I have scribed the entire note.      History     Chief Complaint   Patient presents with    Seizures     Seizure last night     Review of patient's allergies indicates:   Allergen Reactions    Potassium Hives, Itching and Other (See Comments)     Burning Sensation on Skin/ ONLY WHEN INJECTED NOT WHEN INGESTED          History of Present Illness     HPI    2/17/2022, 1:11 PM  History obtained from the patient      History of Present Illness: Vadim Devries III is a 46 y.o. male patient with a PMHx of seizures, DM, and stroke, who presents to the Emergency Department for evaluation of seizure which onset suddenly last night. Pt has not been taking his Dilantin. Symptoms are intermittent and moderate in severity. No mitigating or exacerbating factors reported. Associated sxs include CP and SOB. Patient denies any fever, chills, N/V, abdominal pain, dizziness, weakness, numbness, and all other sxs at this time. No prior Tx reported. No further complaints or concerns at this time.       Arrival mode: Ambulance Service    PCP: Petr Mckeon MD        Past Medical History:  Past Medical History:   Diagnosis Date    Chronic combined systolic and diastolic heart failure 12/1/2021    Diabetes mellitus     Hypertension     Seizures     Stroke        Past Surgical History:  History reviewed. No pertinent surgical history.      Family History:  No family history on file.    Social History:  Social History     Tobacco Use    Smoking status: Never Smoker    Smokeless tobacco: Never Used   Substance and Sexual Activity    Alcohol use: No    Drug use: No    Sexual activity: Not on file        Review of Systems     Review of Systems   Constitutional: Negative for fever.   HENT: Negative for congestion and sore throat.    Respiratory: Positive for shortness of breath. Negative for cough.    Cardiovascular: Positive for  chest pain.   Gastrointestinal: Negative for abdominal pain, diarrhea, nausea and vomiting.   Genitourinary: Negative for dysuria.   Musculoskeletal: Negative for back pain.   Skin: Negative for rash.   Neurological: Positive for seizures. Negative for dizziness, weakness and numbness.   Hematological: Does not bruise/bleed easily.   All other systems reviewed and are negative.     Physical Exam     Initial Vitals [02/17/22 1213]   BP Pulse Resp Temp SpO2   (!) 190/115 90 18 98.2 °F (36.8 °C) 98 %      MAP       --          Physical Exam  Nursing Notes and Vital Signs Reviewed.  Constitutional: Patient is in no acute distress. Well-developed and well-nourished.  Head: Atraumatic. Normocephalic.  Eyes: EOM intact. Conjunctivae are not pale. No scleral icterus.  ENT: Mucous membranes are moist. Oropharynx is clear and symmetric.    Neck: Supple. Full ROM. No lymphadenopathy.  Cardiovascular: Regular rate. Regular rhythm. No murmurs, rubs, or gallops. Distal pulses are 2+ and symmetric.  Pulmonary/Chest: No respiratory distress. Clear to auscultation bilaterally. No wheezing or rales.  Abdominal: Soft and non-distended.  There is no tenderness.  No rebound, guarding, or rigidity.  Musculoskeletal: Moves all extremities. No obvious deformities. No edema.  Skin: Warm and dry.  Neurological:  Alert, awake, and appropriate.  Normal speech.  No acute focal neurological deficits are appreciated.  Psychiatric: Normal affect. Good eye contact. Appropriate in content.     ED Course   Procedures  ED Vital Signs:  Vitals:    02/17/22 1213 02/17/22 1222 02/17/22 1230 02/17/22 1237   BP: (!) 190/115   (!) 218/111   Pulse: 90  84    Resp: 18      Temp: 98.2 °F (36.8 °C)      TempSrc: Oral      SpO2: 98%      Weight:  94.8 kg (208 lb 15.9 oz)      02/17/22 1245 02/17/22 1302   BP: (!) 203/109 (!) 196/101   Pulse: 83 77   Resp: 18 13   Temp:     TempSrc:     SpO2: 100% 100%   Weight:         Abnormal Lab Results:  Labs Reviewed   CBC  W/ AUTO DIFFERENTIAL - Abnormal; Notable for the following components:       Result Value    Hemoglobin 13.7 (*)     MCV 79 (*)     MCH 24.8 (*)     MCHC 31.6 (*)     RDW 15.1 (*)     All other components within normal limits   COMPREHENSIVE METABOLIC PANEL - Abnormal; Notable for the following components:    Glucose 188 (*)     BUN 25 (*)     eGFR if non  59 (*)     All other components within normal limits   B-TYPE NATRIURETIC PEPTIDE - Abnormal; Notable for the following components:     (*)     All other components within normal limits   CK   TROPONIN I   URINALYSIS, REFLEX TO URINE CULTURE      All Lab Results:  Results for orders placed or performed during the hospital encounter of 02/17/22   CBC Auto Differential   Result Value Ref Range    WBC 7.52 3.90 - 12.70 K/uL    RBC 5.52 4.60 - 6.20 M/uL    Hemoglobin 13.7 (L) 14.0 - 18.0 g/dL    Hematocrit 43.4 40.0 - 54.0 %    MCV 79 (L) 82 - 98 fL    MCH 24.8 (L) 27.0 - 31.0 pg    MCHC 31.6 (L) 32.0 - 36.0 g/dL    RDW 15.1 (H) 11.5 - 14.5 %    Platelets 161 150 - 450 K/uL    MPV 11.8 9.2 - 12.9 fL    Immature Granulocytes 0.3 0.0 - 0.5 %    Gran # (ANC) 4.7 1.8 - 7.7 K/uL    Immature Grans (Abs) 0.02 0.00 - 0.04 K/uL    Lymph # 2.0 1.0 - 4.8 K/uL    Mono # 0.7 0.3 - 1.0 K/uL    Eos # 0.1 0.0 - 0.5 K/uL    Baso # 0.04 0.00 - 0.20 K/uL    nRBC 0 0 /100 WBC    Gran % 62.7 38.0 - 73.0 %    Lymph % 26.1 18.0 - 48.0 %    Mono % 8.8 4.0 - 15.0 %    Eosinophil % 1.6 0.0 - 8.0 %    Basophil % 0.5 0.0 - 1.9 %    Differential Method Automated    Comprehensive Metabolic Panel   Result Value Ref Range    Sodium 143 136 - 145 mmol/L    Potassium 3.9 3.5 - 5.1 mmol/L    Chloride 105 95 - 110 mmol/L    CO2 28 23 - 29 mmol/L    Glucose 188 (H) 70 - 110 mg/dL    BUN 25 (H) 6 - 20 mg/dL    Creatinine 1.4 0.5 - 1.4 mg/dL    Calcium 9.3 8.7 - 10.5 mg/dL    Total Protein 7.7 6.0 - 8.4 g/dL    Albumin 3.8 3.5 - 5.2 g/dL    Total Bilirubin 0.5 0.1 - 1.0 mg/dL     Alkaline Phosphatase 125 55 - 135 U/L    AST 20 10 - 40 U/L    ALT 12 10 - 44 U/L    Anion Gap 10 8 - 16 mmol/L    eGFR if African American >60 >60 mL/min/1.73 m^2    eGFR if non African American 59 (A) >60 mL/min/1.73 m^2   BNP   Result Value Ref Range     (H) 0 - 99 pg/mL   CK   Result Value Ref Range     20 - 200 U/L   Troponin I   Result Value Ref Range    Troponin I 0.024 0.000 - 0.026 ng/mL     Imaging Results:  Imaging Results          X-Ray Chest AP Portable (Final result)  Result time 02/17/22 12:34:45    Final result by Daniel Thomson MD (02/17/22 12:34:45)                 Impression:      1.  Negative for acute process involving the chest.  Specifically, negative for focal infiltrate, or effusion on today's study.    2.  Stable findings as noted above.      Electronically signed by: Daniel Thomson MD  Date:    02/17/2022  Time:    12:34             Narrative:    EXAMINATION:  XR CHEST AP PORTABLE    CLINICAL HISTORY:  htn;    COMPARISON:  November 30, 2021, as well as studies dating back to September 27, 2019    FINDINGS:  A shock jacket remains in place.  Subtle abnormalities could be overlooked.  The lungs are currently clear.  The cardiac silhouette size is borderline enlarged.  The trachea is midline and the mediastinal width is normal. Negative for focal infiltrate, effusion or pneumothorax. Pulmonary vasculature is normal. Negative for osseous abnormalities. Stable marginal spondylosis.                               The EKG was ordered, reviewed, and independently interpreted by the ED provider.  Interpretation time: 12:16  Rate: 79 BPM  Rhythm: normal sinus rhythm  Interpretation: Nonspecific T wave abnormality. No STEMI.           The Emergency Provider reviewed the vital signs and test results, which are outlined above.     ED Discussion     1:38 PM: Reassessed pt at this time. Discussed with pt all pertinent ED information and results. Discussed pt dx and plan of tx. Gave pt all  f/u and return to the ED instructions. All questions and concerns were addressed at this time. Pt expresses understanding of information and instructions, and is comfortable with plan to discharge. Pt is stable for discharge.    I discussed with patient and/or family/caretaker that evaluation in the ED does not suggest any emergent or life threatening medical conditions requiring immediate intervention beyond what was provided in the ED, and I believe patient is safe for discharge.  Regardless, an unremarkable evaluation in the ED does not preclude the development or presence of a serious of life threatening condition. As such, patient was instructed to return immediately for any worsening or change in current symptoms.       Medical Decision Making:   Clinical Tests:   Lab Tests: Ordered and Reviewed  Radiological Study: Ordered and Reviewed  Medical Tests: Ordered and Reviewed           ED Medication(s):  Medications   hydrALAZINE injection 10 mg (10 mg Intravenous Given 2/17/22 7427)       Current Discharge Medication List           Follow-up Information     Petr Mckeon MD.    Specialty: Family Medicine  Contact information:  [address] [facility] [address] [phone]                             Scribe Attestation:   Scribe #1: I performed the above scribed service and the documentation accurately describes the services I performed. I attest to the accuracy of the note.     Attending:   Physician Attestation Statement for Scribe #1: I, Adan Parnell MD, personally performed the services described in this documentation, as scribed by Shane Bear, in my presence, and it is both accurate and complete.          Clinical Impression       ICD-10-CM ICD-9-CM   1. Seizure disorder  G40.909 345.90   2. HTN (hypertension)  I10 401.9       Disposition:   Disposition: Discharged  Condition: Stable         Adan Parnell MD  02/17/22 1344

## 2022-02-17 NOTE — TELEPHONE ENCOUNTER
See MA note below.  Patient needs to seek emergency medical treatment. I have called him to explain this and have offered to call 911 for him. He tells me he feels fine and does not need to go to the hospital. He will not let me call 911 for him. He is also cancelling tomorrows appt d/t transportation issues. I explained I cannot refill any of his meds with these ongoing issues he is having without me evaluating him further.    I then called EMS to please do a wellness check on this patient. It appears the patient is now in [facility] ED.

## 2022-02-17 NOTE — TELEPHONE ENCOUNTER
Does not have Neurologist, he uses PCP Dr. Alexandra Adorno.     I called the patient to confirm his appointment for tomorrow. He says he has to reschedule because he has been having more frequent seizures, including one last night.     The blood pressure was 180/130 2 days ago. He is unable to check his own blood pressure or weight.     He says he has been having chest pain for the last 3 days and describes it as sharp intermittent pain. He is still wearing his lifevest. He mentioned having intermittent worsening shortness of breath and annie swelling due to the ability to put his socks on.     He is trying to see his PCP for seizure control, because he does not have a neurologist.     He also mentioned only having 3 days left of his torsemide and needing a refill of his Entresto and Carvedilol.     Please advise.

## 2022-02-18 ENCOUNTER — TELEPHONE (OUTPATIENT)
Dept: TRANSPLANT | Facility: CLINIC | Age: 47
End: 2022-02-18
Payer: COMMERCIAL

## 2022-02-18 DIAGNOSIS — I50.22 CHRONIC HFREF (HEART FAILURE WITH REDUCED EJECTION FRACTION): ICD-10-CM

## 2022-02-18 DIAGNOSIS — I50.42 CHRONIC COMBINED SYSTOLIC AND DIASTOLIC HEART FAILURE: ICD-10-CM

## 2022-02-18 RX ORDER — HYDRALAZINE HYDROCHLORIDE 25 MG/1
50 TABLET, FILM COATED ORAL 3 TIMES DAILY
Qty: 90 TABLET | Refills: 6 | Status: SHIPPED | OUTPATIENT
Start: 2022-02-18 | End: 2022-03-29 | Stop reason: SDUPTHER

## 2022-02-18 RX ORDER — CARVEDILOL 25 MG/1
25 TABLET ORAL 2 TIMES DAILY
Qty: 60 TABLET | Refills: 6 | Status: SHIPPED | OUTPATIENT
Start: 2022-02-18 | End: 2022-03-29 | Stop reason: SDUPTHER

## 2022-02-18 RX ORDER — TORSEMIDE 20 MG/1
60 TABLET ORAL DAILY
Qty: 120 TABLET | Refills: 3 | Status: SHIPPED | OUTPATIENT
Start: 2022-02-18 | End: 2022-03-29 | Stop reason: SDUPTHER

## 2022-02-18 RX ORDER — SPIRONOLACTONE 25 MG/1
12.5 TABLET ORAL DAILY
Qty: 15 TABLET | Refills: 5 | Status: SHIPPED | OUTPATIENT
Start: 2022-02-18 | End: 2022-03-29 | Stop reason: SDUPTHER

## 2022-03-14 ENCOUNTER — TELEPHONE (OUTPATIENT)
Dept: CARDIOLOGY | Facility: CLINIC | Age: 47
End: 2022-03-14
Payer: COMMERCIAL

## 2022-03-14 NOTE — TELEPHONE ENCOUNTER
I attempted to contact the patient and left him a voicemail to call back. Pt needs a follow up appointment.

## 2022-03-29 ENCOUNTER — TELEPHONE (OUTPATIENT)
Dept: TRANSPLANT | Facility: CLINIC | Age: 47
End: 2022-03-29
Payer: COMMERCIAL

## 2022-03-29 DIAGNOSIS — I50.42 CHRONIC COMBINED SYSTOLIC AND DIASTOLIC HEART FAILURE: Primary | ICD-10-CM

## 2022-03-29 DIAGNOSIS — I50.42 CHRONIC COMBINED SYSTOLIC AND DIASTOLIC HEART FAILURE: ICD-10-CM

## 2022-03-29 DIAGNOSIS — I50.22 CHRONIC HFREF (HEART FAILURE WITH REDUCED EJECTION FRACTION): ICD-10-CM

## 2022-03-29 RX ORDER — CARVEDILOL 25 MG/1
25 TABLET ORAL 2 TIMES DAILY
Qty: 60 TABLET | Refills: 6 | Status: SHIPPED | OUTPATIENT
Start: 2022-03-29 | End: 2022-05-04 | Stop reason: SDUPTHER

## 2022-03-29 RX ORDER — SPIRONOLACTONE 25 MG/1
12.5 TABLET ORAL DAILY
Qty: 15 TABLET | Refills: 5 | Status: SHIPPED | OUTPATIENT
Start: 2022-03-29 | End: 2022-05-04 | Stop reason: SDUPTHER

## 2022-03-29 RX ORDER — ISOSORBIDE DINITRATE 10 MG/1
10 TABLET ORAL 3 TIMES DAILY
Qty: 90 TABLET | Refills: 6 | Status: SHIPPED | OUTPATIENT
Start: 2022-03-29 | End: 2022-05-04 | Stop reason: SDUPTHER

## 2022-03-29 RX ORDER — TORSEMIDE 20 MG/1
60 TABLET ORAL DAILY
Qty: 120 TABLET | Refills: 3 | Status: SHIPPED | OUTPATIENT
Start: 2022-03-29 | End: 2022-05-04 | Stop reason: SDUPTHER

## 2022-03-29 RX ORDER — HYDRALAZINE HYDROCHLORIDE 25 MG/1
50 TABLET, FILM COATED ORAL 3 TIMES DAILY
Qty: 90 TABLET | Refills: 6 | Status: SHIPPED | OUTPATIENT
Start: 2022-03-29 | End: 2022-05-04 | Stop reason: ALTCHOICE

## 2022-03-29 NOTE — TELEPHONE ENCOUNTER
----- Message from ARBEN Morgan sent at 3/29/2022 10:41 AM CDT -----  Please schedule bmp, bnp pro bnp prior to appt with me  See if he can bring BP log and meds to a ppt too    Shelly

## 2022-04-01 ENCOUNTER — TELEPHONE (OUTPATIENT)
Dept: CARDIOLOGY | Facility: CLINIC | Age: 47
End: 2022-04-01
Payer: COMMERCIAL

## 2022-05-04 ENCOUNTER — TELEPHONE (OUTPATIENT)
Dept: PHARMACY | Facility: CLINIC | Age: 47
End: 2022-05-04
Payer: COMMERCIAL

## 2022-05-04 DIAGNOSIS — I50.22 CHRONIC HFREF (HEART FAILURE WITH REDUCED EJECTION FRACTION): ICD-10-CM

## 2022-05-04 DIAGNOSIS — I50.42 CHRONIC COMBINED SYSTOLIC AND DIASTOLIC HEART FAILURE: Primary | ICD-10-CM

## 2022-05-04 DIAGNOSIS — I50.42 CHRONIC COMBINED SYSTOLIC AND DIASTOLIC HEART FAILURE: ICD-10-CM

## 2022-05-04 RX ORDER — TORSEMIDE 20 MG/1
60 TABLET ORAL DAILY
Qty: 90 TABLET | Refills: 3 | Status: SHIPPED | OUTPATIENT
Start: 2022-05-04 | End: 2022-06-21 | Stop reason: SDUPTHER

## 2022-05-04 RX ORDER — ISOSORBIDE DINITRATE 10 MG/1
10 TABLET ORAL 3 TIMES DAILY
Qty: 90 TABLET | Refills: 6 | Status: SHIPPED | OUTPATIENT
Start: 2022-05-04 | End: 2022-06-22 | Stop reason: SDUPTHER

## 2022-05-04 RX ORDER — DAPAGLIFLOZIN 10 MG/1
10 TABLET, FILM COATED ORAL DAILY
COMMUNITY
End: 2022-05-04 | Stop reason: SDUPTHER

## 2022-05-04 RX ORDER — SPIRONOLACTONE 25 MG/1
12.5 TABLET ORAL DAILY
Qty: 15 TABLET | Refills: 5 | Status: SHIPPED | OUTPATIENT
Start: 2022-05-04 | End: 2022-06-21 | Stop reason: SDUPTHER

## 2022-05-04 RX ORDER — CARVEDILOL 25 MG/1
25 TABLET ORAL 2 TIMES DAILY
Qty: 60 TABLET | Refills: 6 | Status: SHIPPED | OUTPATIENT
Start: 2022-05-04 | End: 2023-03-08 | Stop reason: SDUPTHER

## 2022-05-04 RX ORDER — DAPAGLIFLOZIN 10 MG/1
10 TABLET, FILM COATED ORAL DAILY
Qty: 30 TABLET | Refills: 6 | Status: SHIPPED | OUTPATIENT
Start: 2022-05-04 | End: 2022-06-21 | Stop reason: SDUPTHER

## 2022-06-06 ENCOUNTER — TELEPHONE (OUTPATIENT)
Dept: TRANSPLANT | Facility: CLINIC | Age: 47
End: 2022-06-06
Payer: COMMERCIAL

## 2022-06-06 NOTE — TELEPHONE ENCOUNTER
Received call and spoke with pt.   Pt requesting to move appt and tests d/t transportation issues.   Appts moved to 6/22/2022 per pt request.     Pt informed cough is better  Denies SOB, PND, orthopnea, or edema.     Pt did inform has had some CP episodes over the weekend that would come and go.   Pt described the episodes as a tightness that would last 5-10 minutes and then would resolve on its own in the evening time.   Pt denies CP at this time. Recommended pt to go to ER/call 911 if has any further episodes of CP.

## 2022-06-21 DIAGNOSIS — I50.22 CHRONIC HFREF (HEART FAILURE WITH REDUCED EJECTION FRACTION): ICD-10-CM

## 2022-06-21 DIAGNOSIS — I50.42 CHRONIC COMBINED SYSTOLIC AND DIASTOLIC HEART FAILURE: ICD-10-CM

## 2022-06-21 RX ORDER — TORSEMIDE 20 MG/1
60 TABLET ORAL DAILY
Qty: 90 TABLET | Refills: 3 | Status: SHIPPED | OUTPATIENT
Start: 2022-06-21 | End: 2023-03-08 | Stop reason: SDUPTHER

## 2022-06-21 RX ORDER — DAPAGLIFLOZIN 10 MG/1
10 TABLET, FILM COATED ORAL DAILY
Qty: 90 TABLET | Refills: 3 | Status: SHIPPED | OUTPATIENT
Start: 2022-06-21 | End: 2023-05-02 | Stop reason: SDUPTHER

## 2022-06-21 RX ORDER — SPIRONOLACTONE 25 MG/1
12.5 TABLET ORAL DAILY
Qty: 45 TABLET | Refills: 3 | Status: SHIPPED | OUTPATIENT
Start: 2022-06-21 | End: 2023-03-08 | Stop reason: SDUPTHER

## 2022-06-22 ENCOUNTER — HOSPITAL ENCOUNTER (OUTPATIENT)
Dept: CARDIOLOGY | Facility: HOSPITAL | Age: 47
Discharge: HOME | End: 2022-06-22
Attending: NURSE PRACTITIONER
Payer: COMMERCIAL

## 2022-06-22 ENCOUNTER — OFFICE VISIT (OUTPATIENT)
Dept: TRANSPLANT | Facility: CLINIC | Age: 47
End: 2022-06-22
Payer: COMMERCIAL

## 2022-06-22 VITALS — DIASTOLIC BLOOD PRESSURE: 120 MMHG | OXYGEN SATURATION: 99 % | HEART RATE: 98 BPM | SYSTOLIC BLOOD PRESSURE: 200 MMHG

## 2022-06-22 VITALS — DIASTOLIC BLOOD PRESSURE: 110 MMHG | SYSTOLIC BLOOD PRESSURE: 200 MMHG | HEART RATE: 86 BPM

## 2022-06-22 DIAGNOSIS — I50.42 CHRONIC COMBINED SYSTOLIC AND DIASTOLIC HEART FAILURE: ICD-10-CM

## 2022-06-22 DIAGNOSIS — E11.59 HYPERTENSION ASSOCIATED WITH DIABETES: Primary | ICD-10-CM

## 2022-06-22 DIAGNOSIS — E11.9 TYPE 2 DIABETES MELLITUS WITHOUT COMPLICATION, UNSPECIFIED WHETHER LONG TERM INSULIN USE: ICD-10-CM

## 2022-06-22 DIAGNOSIS — I15.2 HYPERTENSION ASSOCIATED WITH DIABETES: Primary | ICD-10-CM

## 2022-06-22 DIAGNOSIS — Z86.73 HISTORY OF STROKE: ICD-10-CM

## 2022-06-22 PROCEDURE — 93306 ECHO (CUPID ONLY): ICD-10-PCS | Mod: 26,,, | Performed by: INTERNAL MEDICINE

## 2022-06-22 PROCEDURE — 1159F MED LIST DOCD IN RCRD: CPT | Mod: CPTII,S$GLB,, | Performed by: NURSE PRACTITIONER

## 2022-06-22 PROCEDURE — 93306 TTE W/DOPPLER COMPLETE: CPT | Mod: 26,,, | Performed by: INTERNAL MEDICINE

## 2022-06-22 PROCEDURE — 3080F PR MOST RECENT DIASTOLIC BLOOD PRESSURE >= 90 MM HG: ICD-10-PCS | Mod: CPTII,S$GLB,, | Performed by: NURSE PRACTITIONER

## 2022-06-22 PROCEDURE — 99214 PR OFFICE/OUTPT VISIT, EST, LEVL IV, 30-39 MIN: ICD-10-PCS | Mod: S$GLB,,, | Performed by: NURSE PRACTITIONER

## 2022-06-22 PROCEDURE — 3051F HG A1C>EQUAL 7.0%<8.0%: CPT | Mod: CPTII,S$GLB,, | Performed by: NURSE PRACTITIONER

## 2022-06-22 PROCEDURE — 99999 PR PBB SHADOW E&M-EST. PATIENT-LVL III: ICD-10-PCS | Mod: PBBFAC,,, | Performed by: NURSE PRACTITIONER

## 2022-06-22 PROCEDURE — 3077F PR MOST RECENT SYSTOLIC BLOOD PRESSURE >= 140 MM HG: ICD-10-PCS | Mod: CPTII,S$GLB,, | Performed by: NURSE PRACTITIONER

## 2022-06-22 PROCEDURE — 3077F SYST BP >= 140 MM HG: CPT | Mod: CPTII,S$GLB,, | Performed by: NURSE PRACTITIONER

## 2022-06-22 PROCEDURE — 4010F ACE/ARB THERAPY RXD/TAKEN: CPT | Mod: CPTII,S$GLB,, | Performed by: NURSE PRACTITIONER

## 2022-06-22 PROCEDURE — 4010F PR ACE/ARB THEARPY RXD/TAKEN: ICD-10-PCS | Mod: CPTII,S$GLB,, | Performed by: NURSE PRACTITIONER

## 2022-06-22 PROCEDURE — 1159F PR MEDICATION LIST DOCUMENTED IN MEDICAL RECORD: ICD-10-PCS | Mod: CPTII,S$GLB,, | Performed by: NURSE PRACTITIONER

## 2022-06-22 PROCEDURE — 99214 OFFICE O/P EST MOD 30 MIN: CPT | Mod: S$GLB,,, | Performed by: NURSE PRACTITIONER

## 2022-06-22 PROCEDURE — 3051F PR MOST RECENT HEMOGLOBIN A1C LEVEL 7.0 - < 8.0%: ICD-10-PCS | Mod: CPTII,S$GLB,, | Performed by: NURSE PRACTITIONER

## 2022-06-22 PROCEDURE — 3080F DIAST BP >= 90 MM HG: CPT | Mod: CPTII,S$GLB,, | Performed by: NURSE PRACTITIONER

## 2022-06-22 PROCEDURE — 99999 PR PBB SHADOW E&M-EST. PATIENT-LVL III: CPT | Mod: PBBFAC,,, | Performed by: NURSE PRACTITIONER

## 2022-06-22 PROCEDURE — 93306 TTE W/DOPPLER COMPLETE: CPT

## 2022-06-22 RX ORDER — ISOSORBIDE DINITRATE 40 MG/1
40 TABLET ORAL 2 TIMES DAILY
Qty: 60 TABLET | Refills: 11 | Status: SHIPPED | OUTPATIENT
Start: 2022-06-22 | End: 2023-03-08 | Stop reason: SDUPTHER

## 2022-06-22 NOTE — PROGRESS NOTES
Subjective:   Patient ID:  Vadim Devries III is a 46 y.o. male who presents for evaluation of Congestive Heart Failure      HPI    Vadim Devries III is a 46 year old male who presents to Cardiology clinic for follow up. His current medical conditions include CHF, HFrEF of 20%, PHTN, DM type II, HTN, H/O CVA, Seizures, paralysis, HLP, obesity. He was last seen in clinic in January 2022. His CHF meds were adjusted at that visit but he has not followed up in clinic since then. He continues to have issues with elevated BP on a regular basis. Sunday BP was 280/120 with associated fatigue and weakness.     Reports compliance with medications.   Has days with worse shortness of breath than others.   Every 3 days he has issues with worsening shortness of breath with orthopnea.   Has days with extreme fatigue. Denies dizziness, LH, syncope or near syncopal events. Continues to utilize LifeVest, no firing. No signs of bleeding.       Past Medical History:   Diagnosis Date    Chronic combined systolic and diastolic heart failure 12/1/2021    Diabetes mellitus     Hypertension     Seizures     Stroke        No past surgical history on file.    Social History     Tobacco Use    Smoking status: Never Smoker    Smokeless tobacco: Never Used   Substance Use Topics    Alcohol use: No    Drug use: No       No family history on file.  Wt Readings from Last 3 Encounters:   02/17/22 94.8 kg (208 lb 15.9 oz)   12/09/21 99 kg (218 lb 4.1 oz)   12/01/21 99.6 kg (219 lb 9.3 oz)     Temp Readings from Last 3 Encounters:   02/17/22 98.2 °F (36.8 °C) (Oral)   12/01/21 97.4 °F (36.3 °C) (Oral)   03/31/21 98.4 °F (36.9 °C) (Oral)     BP Readings from Last 3 Encounters:   06/22/22 (!) 200/110   06/22/22 (!) 200/120   02/17/22 (!) 179/91     Pulse Readings from Last 3 Encounters:   06/22/22 86   06/22/22 98   02/17/22 72     Current Outpatient Medications on File Prior to Visit   Medication Sig Dispense Refill     carvediloL (COREG) 25 MG tablet Take 1 tablet (25 mg total) by mouth 2 (two) times daily. 60 tablet 6    dapagliflozin (FARXIGA) 10 mg tablet Take 1 tablet (10 mg total) by mouth once daily. 90 tablet 3    doxepin (SINEQUAN) 25 MG capsule Take 1 capsule (25 mg total) by mouth every evening. 30 capsule 0    metFORMIN (GLUCOPHAGE) 500 MG tablet Take 1 tablet (500 mg total) by mouth 2 (two) times daily with meals. 60 tablet 0    phenytoin (DILANTIN) 100 MG ER capsule Take 2 capsules (200 mg total) by mouth 3 (three) times daily. 180 capsule 0    potassium chloride SA (K-DUR,KLOR-CON) 20 MEQ tablet       sacubitriL-valsartan (ENTRESTO)  mg per tablet Take 1 tablet by mouth 2 (two) times daily. 60 tablet 11    simvastatin (ZOCOR) 10 MG tablet Take 1 tablet (10 mg total) by mouth every evening. 30 tablet 0    spironolactone (ALDACTONE) 25 MG tablet Take 0.5 tablets (12.5 mg total) by mouth once daily. 45 tablet 3    torsemide (DEMADEX) 20 MG Tab Take 3 tablets (60 mg total) by mouth once daily. 90 tablet 3    [DISCONTINUED] isosorbide dinitrate (ISORDIL) 10 MG tablet Take 1 tablet (10 mg total) by mouth 3 (three) times daily. 90 tablet 6    gabapentin (NEURONTIN) 300 MG capsule Take 1 capsule (300 mg total) by mouth 3 (three) times daily as needed (as needed for pain). 42 capsule 0    [DISCONTINUED] amLODIPine (NORVASC) 10 MG tablet Take 1 tablet (10 mg total) by mouth once daily. 30 tablet 0    [DISCONTINUED] labetaloL (NORMODYNE) 100 MG tablet Take 1 tablet (100 mg total) by mouth 2 (two) times daily. 30 tablet 0    [DISCONTINUED] lisinopriL 10 MG tablet Take 1 tablet (10 mg total) by mouth once daily. 30 tablet 0     No current facility-administered medications on file prior to visit.       Review of Systems   Constitutional: Positive for malaise/fatigue.   HENT: Negative for hearing loss and hoarse voice.    Eyes: Negative for blurred vision and visual disturbance.   Cardiovascular: Positive for  dyspnea on exertion and orthopnea. Negative for chest pain, claudication, irregular heartbeat, leg swelling, near-syncope, palpitations, paroxysmal nocturnal dyspnea and syncope.   Respiratory: Negative for cough, hemoptysis, shortness of breath, sleep disturbances due to breathing, snoring and wheezing.    Endocrine: Negative for cold intolerance and heat intolerance.   Hematologic/Lymphatic: Does not bruise/bleed easily.   Skin: Negative for color change, dry skin and nail changes.   Musculoskeletal: Positive for arthritis and back pain. Negative for joint pain and myalgias.   Gastrointestinal: Negative for bloating, abdominal pain, constipation, nausea and vomiting.   Genitourinary: Negative for dysuria, flank pain, hematuria and hesitancy.   Neurological: Positive for weakness. Negative for headaches, light-headedness, loss of balance, numbness and paresthesias.   Psychiatric/Behavioral: Negative for altered mental status.   Allergic/Immunologic: Negative for environmental allergies.     BP (!) 200/120 (Patient Position: Sitting)   Pulse 98   SpO2 99%     Objective:   Physical Exam  Vitals and nursing note reviewed.   Constitutional:       General: He is not in acute distress.     Appearance: Normal appearance. He is well-developed. He is not ill-appearing.   HENT:      Head: Normocephalic and atraumatic.      Nose: Nose normal.      Mouth/Throat:      Mouth: Mucous membranes are moist.   Eyes:      Pupils: Pupils are equal, round, and reactive to light.   Neck:      Thyroid: No thyromegaly.      Vascular: No JVD.      Trachea: No tracheal deviation.   Cardiovascular:      Rate and Rhythm: Normal rate and regular rhythm.      Chest Wall: PMI is not displaced.      Pulses: Intact distal pulses.           Radial pulses are 2+ on the right side and 2+ on the left side.        Dorsalis pedis pulses are 2+ on the right side and 2+ on the left side.      Heart sounds: S1 normal and S2 normal. Heart sounds not  distant. No murmur heard.     Comments: On LifeVest  Pulmonary:      Effort: Pulmonary effort is normal. No respiratory distress.      Breath sounds: Normal breath sounds and air entry. No decreased breath sounds, wheezing or rales.   Abdominal:      General: Bowel sounds are normal.      Palpations: Abdomen is soft.   Musculoskeletal:         General: No swelling. Normal range of motion.      Cervical back: Full passive range of motion without pain, normal range of motion and neck supple.      Right ankle: No swelling.      Left ankle: No swelling.   Skin:     General: Skin is warm and dry.      Capillary Refill: Capillary refill takes less than 2 seconds.      Nails: There is no clubbing.   Neurological:      General: No focal deficit present.      Mental Status: He is alert and oriented to person, place, and time.      Motor: Weakness present.   Psychiatric:         Mood and Affect: Mood normal.         Speech: Speech normal.         Behavior: Behavior normal.         Thought Content: Thought content normal.         Judgment: Judgment normal.         Lab Results   Component Value Date    CHOL 144 11/28/2021     Lab Results   Component Value Date    HDL 29 (L) 11/28/2021     Lab Results   Component Value Date    LDLCALC 100.8 11/28/2021     Lab Results   Component Value Date    TRIG 83 11/30/2021    TRIG 71 11/28/2021     Lab Results   Component Value Date    CHOLHDL 20.1 11/28/2021       Chemistry        Component Value Date/Time     02/17/2022 1237    K 3.9 02/17/2022 1237     02/17/2022 1237    CO2 28 02/17/2022 1237    BUN 25 (H) 02/17/2022 1237    CREATININE 1.4 02/17/2022 1237     (H) 02/17/2022 1237        Component Value Date/Time    CALCIUM 9.3 02/17/2022 1237    ALKPHOS 125 02/17/2022 1237    AST 20 02/17/2022 1237    ALT 12 02/17/2022 1237    BILITOT 0.5 02/17/2022 1237    ESTGFRAFRICA >60 02/17/2022 1237    EGFRNONAA 59 (A) 02/17/2022 1237          Lab Results   Component Value Date     TSH 0.910 11/28/2021     Lab Results   Component Value Date    INR 1.0 11/27/2021    INR 1.0 04/11/2017     Lab Results   Component Value Date    WBC 7.52 02/17/2022    HGB 13.7 (L) 02/17/2022    HCT 43.4 02/17/2022    MCV 79 (L) 02/17/2022     02/17/2022        Assessment:      1. Hypertension associated with diabetes    2. Chronic combined systolic and diastolic heart failure    3. Type 2 diabetes mellitus without complication, unspecified whether long term insulin use    4. History of stroke        Plan:     Await labs and ECHO results  Continue LifeVest for now  Further decisions afterwards    CHF SYNOPSIS:    GDMT:  ACE/ARB/ARNI:  Entresto 97/103 BID  Beta Blocker:  Coreg 25 mg BID  MRA: aldactone 12.5 mg daily  SGLT2: Farxiga 10 mg daily  Diuretic: torsemide 60 mg daily    ARBEN Maddox  [facility]

## 2022-06-23 ENCOUNTER — TELEPHONE (OUTPATIENT)
Dept: TRANSPLANT | Facility: CLINIC | Age: 47
End: 2022-06-23
Payer: COMMERCIAL

## 2022-06-23 LAB
AORTIC ROOT ANNULUS: 3.19 CM
ASCENDING AORTA: 3.75 CM
CV ECHO LV RWT: 0.49 CM
DOP CALC LVOT AREA: 4.6 CM2
DOP CALC LVOT DIAMETER: 2.41 CM
DOP CALC LVOT PEAK VEL: 0.85 M/S
DOP CALC LVOT STROKE VOLUME: 67.93 CM3
DOP CALC RVOT PEAK VEL: 0.68 M/S
DOP CALC RVOT VTI: 13.3 CM
DOP CALCLVOT PEAK VEL VTI: 14.9 CM
E WAVE DECELERATION TIME: 84.33 MSEC
E/A RATIO: 0.51
E/E' RATIO: 7.5 M/S
ECHO LV POSTERIOR WALL: 1.24 CM (ref 0.6–1.1)
EJECTION FRACTION: 50 %
FRACTIONAL SHORTENING: 19 % (ref 28–44)
INTERVENTRICULAR SEPTUM: 1.23 CM (ref 0.6–1.1)
IVRT: 162.7 MSEC
LA MAJOR: 4.43 CM
LA MINOR: 4.67 CM
LA WIDTH: 3.7 CM
LEFT ATRIUM SIZE: 4.05 CM
LEFT ATRIUM VOLUME MOD: 46.52 CM3
LEFT ATRIUM VOLUME: 57.91 CM3
LEFT INTERNAL DIMENSION IN SYSTOLE: 4.09 CM (ref 2.1–4)
LEFT VENTRICLE DIASTOLIC VOLUME: 120.6 ML
LEFT VENTRICLE SYSTOLIC VOLUME: 73.86 ML
LEFT VENTRICULAR INTERNAL DIMENSION IN DIASTOLE: 5.04 CM (ref 3.5–6)
LEFT VENTRICULAR MASS: 246.49 G
LV LATERAL E/E' RATIO: 5.63 M/S
LV SEPTAL E/E' RATIO: 11.25 M/S
LVOT MG: 1.61 MMHG
LVOT MV: 0.59 CM/S
MV PEAK A VEL: 0.88 M/S
MV PEAK E VEL: 0.45 M/S
MV STENOSIS PRESSURE HALF TIME: 24.45 MS
MV VALVE AREA P 1/2 METHOD: 9 CM2
PISA TR MAX VEL: 2.55 M/S
PULM VEIN S/D RATIO: 1.42
PV MEAN GRADIENT: 1.18 MMHG
PV MV: 0.65 M/S
PV PEAK D VEL: 0.46 M/S
PV PEAK S VEL: 0.66 M/S
PV PEAK VELOCITY: 0.94 CM/S
RA MAJOR: 3.83 CM
RA PRESSURE: 3 MMHG
RA WIDTH: 2.45 CM
RIGHT VENTRICULAR END-DIASTOLIC DIMENSION: 2.78 CM
SINUS: 3.69 CM
STJ: 3.71 CM
TDI LATERAL: 0.08 M/S
TDI SEPTAL: 0.04 M/S
TDI: 0.06 M/S
TR MAX PG: 26 MMHG
TV REST PULMONARY ARTERY PRESSURE: 29 MMHG

## 2022-06-23 NOTE — TELEPHONE ENCOUNTER
Pt notified  zoll notified to d/c zulema.   phrev scheduled in 2 weeks to f/u on bp          ----- Message from ARBEN Morgan sent at 6/23/2022 10:30 AM CDT -----  Please notify patient  ECHO reviewed, EF 50%  Ok to remove LifeVest  Renal function increased on labs  BNP WNL  Call in 2 weeks for BP log    Shelly

## 2022-07-07 ENCOUNTER — TELEPHONE (OUTPATIENT)
Dept: CARDIOLOGY | Facility: CLINIC | Age: 47
End: 2022-07-07
Payer: COMMERCIAL

## 2022-07-08 ENCOUNTER — TELEPHONE (OUTPATIENT)
Dept: CARDIOLOGY | Facility: CLINIC | Age: 47
End: 2022-07-08
Payer: COMMERCIAL

## 2022-07-08 DIAGNOSIS — I15.2 HYPERTENSION ASSOCIATED WITH DIABETES: Primary | ICD-10-CM

## 2022-07-08 DIAGNOSIS — E11.59 HYPERTENSION ASSOCIATED WITH DIABETES: Primary | ICD-10-CM

## 2022-07-08 DIAGNOSIS — I50.42 CHRONIC COMBINED SYSTOLIC AND DIASTOLIC HEART FAILURE: ICD-10-CM

## 2022-07-08 RX ORDER — HYDRALAZINE HYDROCHLORIDE 25 MG/1
25 TABLET, FILM COATED ORAL 3 TIMES DAILY
Qty: 90 TABLET | Refills: 11 | Status: SHIPPED | OUTPATIENT
Start: 2022-07-08 | End: 2023-03-08 | Stop reason: SDUPTHER

## 2022-07-08 NOTE — TELEPHONE ENCOUNTER
I called the patient for symptom check.     I spoke to the patient.     7/7 191/120   7/5 183/118  7/2 213/130  6/30 189/105    The patient says he gets headaches and dizziness every now and then. He mostly has fatigue all of the time. Last week he had a lot of non-productive coughing with edema, but better now.     Denies any chest pains, edema, palpitations, or seizure activity.     Please advise.

## 2022-07-29 NOTE — TELEPHONE ENCOUNTER
I called pt. No answer. LVM for him to call back.   
Labs stable  STOP lasix  START torsemide 40mg AM and 20mg PM  I want him to start potassium but it says he has an allergy - can we clarify this   Labs 1 week  Phone check Friday   
Ok - if he cant make it to the lab for 2wks;  Lets just do torsemide 40mg once daily. Its ok if he doesn't start this until Friday.  Start potassium 20meq daily    For today and tomorrow until he can  the torsemide just have him take an extra tablet of lasix in the mornings.    Let me know if you have any questions.  Thanks!    
Pt returned my call. Says for his potassium allergy Is for IV potassium. Says when he has seizures and they have to give in IV it causes itching.   Says he can have oral potassium.     Says due to being wheelchair bound and not driving he relies on people to  his meds for him.   Says someone will be running errands for him on Friday and he probably wont be able to make med changes til then.     Also says that he may not be able to go get labs done until next appt date 1/28 because he has to get rides.       
yes

## 2022-08-04 ENCOUNTER — TELEPHONE (OUTPATIENT)
Dept: CARDIOLOGY | Facility: CLINIC | Age: 47
End: 2022-08-04
Payer: COMMERCIAL

## 2022-08-04 NOTE — TELEPHONE ENCOUNTER
----- Message from Kelly Li MA sent at 7/8/2022 12:51 PM CDT -----  ARBEN Morgan 2 minutes ago (12:48 PM)             Add Hydralazine 25 mg TID     Continue to log BP     Phone review in 1 month

## 2023-03-08 DIAGNOSIS — I50.22 CHRONIC HFREF (HEART FAILURE WITH REDUCED EJECTION FRACTION): ICD-10-CM

## 2023-03-08 DIAGNOSIS — E11.59 HYPERTENSION ASSOCIATED WITH DIABETES: ICD-10-CM

## 2023-03-08 DIAGNOSIS — I50.42 CHRONIC COMBINED SYSTOLIC AND DIASTOLIC HEART FAILURE: ICD-10-CM

## 2023-03-08 DIAGNOSIS — I15.2 HYPERTENSION ASSOCIATED WITH DIABETES: ICD-10-CM

## 2023-03-08 RX ORDER — CLONIDINE HYDROCHLORIDE 0.1 MG/1
0.1 TABLET ORAL 2 TIMES DAILY
COMMUNITY
Start: 2022-09-19 | End: 2023-03-08 | Stop reason: SDUPTHER

## 2023-03-08 RX ORDER — HYDRALAZINE HYDROCHLORIDE 25 MG/1
25 TABLET, FILM COATED ORAL 3 TIMES DAILY
Qty: 90 TABLET | Refills: 11 | Status: SHIPPED | OUTPATIENT
Start: 2023-03-08 | End: 2023-05-02 | Stop reason: SDUPTHER

## 2023-03-08 RX ORDER — CLONIDINE HYDROCHLORIDE 0.1 MG/1
0.1 TABLET ORAL 2 TIMES DAILY
Qty: 180 TABLET | Refills: 0 | Status: SHIPPED | OUTPATIENT
Start: 2023-03-08 | End: 2023-06-29 | Stop reason: SDUPTHER

## 2023-03-08 RX ORDER — POTASSIUM CHLORIDE 20 MEQ/1
20 TABLET, EXTENDED RELEASE ORAL DAILY
Qty: 90 TABLET | Refills: 0 | Status: SHIPPED | OUTPATIENT
Start: 2023-03-08 | End: 2024-03-01

## 2023-03-08 RX ORDER — SPIRONOLACTONE 25 MG/1
12.5 TABLET ORAL DAILY
Qty: 45 TABLET | Refills: 0 | Status: SHIPPED | OUTPATIENT
Start: 2023-03-08 | End: 2023-05-02 | Stop reason: SDUPTHER

## 2023-03-08 RX ORDER — CARVEDILOL 25 MG/1
25 TABLET ORAL 2 TIMES DAILY
Qty: 180 TABLET | Refills: 3 | Status: SHIPPED | OUTPATIENT
Start: 2023-03-08 | End: 2024-04-04

## 2023-03-08 RX ORDER — NAPROXEN SODIUM 220 MG/1
81 TABLET, FILM COATED ORAL
COMMUNITY
Start: 2022-09-19

## 2023-03-08 RX ORDER — ATORVASTATIN CALCIUM 40 MG/1
40 TABLET, FILM COATED ORAL
COMMUNITY
Start: 2022-09-19 | End: 2023-05-02 | Stop reason: SDUPTHER

## 2023-03-08 RX ORDER — TORSEMIDE 20 MG/1
60 TABLET ORAL DAILY
Qty: 90 TABLET | Refills: 0 | Status: SHIPPED | OUTPATIENT
Start: 2023-03-08 | End: 2023-05-02 | Stop reason: SDUPTHER

## 2023-03-08 RX ORDER — ISOSORBIDE DINITRATE 40 MG/1
40 TABLET ORAL 2 TIMES DAILY
Qty: 180 TABLET | Refills: 0 | Status: SHIPPED | OUTPATIENT
Start: 2023-03-08 | End: 2023-05-02 | Stop reason: SDUPTHER

## 2023-05-02 ENCOUNTER — TELEPHONE (OUTPATIENT)
Dept: PULMONOLOGY | Facility: CLINIC | Age: 48
End: 2023-05-02
Payer: COMMERCIAL

## 2023-05-02 ENCOUNTER — LAB VISIT (OUTPATIENT)
Dept: LAB | Facility: HOSPITAL | Age: 48
End: 2023-05-02
Attending: NURSE PRACTITIONER
Payer: COMMERCIAL

## 2023-05-02 ENCOUNTER — OFFICE VISIT (OUTPATIENT)
Dept: CARDIOLOGY | Facility: CLINIC | Age: 48
End: 2023-05-02
Payer: COMMERCIAL

## 2023-05-02 VITALS
HEART RATE: 96 BPM | BODY MASS INDEX: 28.31 KG/M2 | OXYGEN SATURATION: 100 % | DIASTOLIC BLOOD PRESSURE: 90 MMHG | WEIGHT: 209 LBS | RESPIRATION RATE: 16 BRPM | SYSTOLIC BLOOD PRESSURE: 180 MMHG | HEIGHT: 72 IN

## 2023-05-02 DIAGNOSIS — G83.9 PARALYSIS: ICD-10-CM

## 2023-05-02 DIAGNOSIS — I15.2 HYPERTENSION ASSOCIATED WITH DIABETES: ICD-10-CM

## 2023-05-02 DIAGNOSIS — E11.9 TYPE 2 DIABETES MELLITUS WITHOUT COMPLICATION, UNSPECIFIED WHETHER LONG TERM INSULIN USE: ICD-10-CM

## 2023-05-02 DIAGNOSIS — I10 UNCONTROLLED HYPERTENSION: ICD-10-CM

## 2023-05-02 DIAGNOSIS — I50.42 CHRONIC COMBINED SYSTOLIC AND DIASTOLIC HEART FAILURE: ICD-10-CM

## 2023-05-02 DIAGNOSIS — I50.22 CHRONIC HFREF (HEART FAILURE WITH REDUCED EJECTION FRACTION): ICD-10-CM

## 2023-05-02 DIAGNOSIS — E11.59 HYPERTENSION ASSOCIATED WITH DIABETES: ICD-10-CM

## 2023-05-02 DIAGNOSIS — I10 UNCONTROLLED HYPERTENSION: Primary | ICD-10-CM

## 2023-05-02 DIAGNOSIS — G47.33 OSA (OBSTRUCTIVE SLEEP APNEA): ICD-10-CM

## 2023-05-02 LAB
ALBUMIN SERPL BCP-MCNC: 3.3 G/DL (ref 3.5–5.2)
ALP SERPL-CCNC: 145 U/L (ref 55–135)
ALT SERPL W/O P-5'-P-CCNC: 15 U/L (ref 10–44)
ANION GAP SERPL CALC-SCNC: 8 MMOL/L (ref 8–16)
AST SERPL-CCNC: 17 U/L (ref 10–40)
BILIRUB SERPL-MCNC: 0.3 MG/DL (ref 0.1–1)
BNP SERPL-MCNC: 47 PG/ML (ref 0–99)
BUN SERPL-MCNC: 18 MG/DL (ref 6–20)
CALCIUM SERPL-MCNC: 9 MG/DL (ref 8.7–10.5)
CHLORIDE SERPL-SCNC: 107 MMOL/L (ref 95–110)
CO2 SERPL-SCNC: 28 MMOL/L (ref 23–29)
CREAT SERPL-MCNC: 1.6 MG/DL (ref 0.5–1.4)
EST. GFR  (NO RACE VARIABLE): 52 ML/MIN/1.73 M^2
ESTIMATED AVG GLUCOSE: 206 MG/DL (ref 68–131)
GLUCOSE SERPL-MCNC: 231 MG/DL (ref 70–110)
HBA1C MFR BLD: 8.8 % (ref 4–5.6)
POTASSIUM SERPL-SCNC: 4.1 MMOL/L (ref 3.5–5.1)
PROT SERPL-MCNC: 6.6 G/DL (ref 6–8.4)
SODIUM SERPL-SCNC: 143 MMOL/L (ref 136–145)

## 2023-05-02 PROCEDURE — 4010F ACE/ARB THERAPY RXD/TAKEN: CPT | Mod: CPTII,S$GLB,, | Performed by: NURSE PRACTITIONER

## 2023-05-02 PROCEDURE — 99214 OFFICE O/P EST MOD 30 MIN: CPT | Mod: S$GLB,,, | Performed by: NURSE PRACTITIONER

## 2023-05-02 PROCEDURE — 3077F SYST BP >= 140 MM HG: CPT | Mod: CPTII,S$GLB,, | Performed by: NURSE PRACTITIONER

## 2023-05-02 PROCEDURE — 99999 PR PBB SHADOW E&M-EST. PATIENT-LVL V: ICD-10-PCS | Mod: PBBFAC,,, | Performed by: NURSE PRACTITIONER

## 2023-05-02 PROCEDURE — 80053 COMPREHEN METABOLIC PANEL: CPT | Performed by: NURSE PRACTITIONER

## 2023-05-02 PROCEDURE — 99214 PR OFFICE/OUTPT VISIT, EST, LEVL IV, 30-39 MIN: ICD-10-PCS | Mod: S$GLB,,, | Performed by: NURSE PRACTITIONER

## 2023-05-02 PROCEDURE — 3080F PR MOST RECENT DIASTOLIC BLOOD PRESSURE >= 90 MM HG: ICD-10-PCS | Mod: CPTII,S$GLB,, | Performed by: NURSE PRACTITIONER

## 2023-05-02 PROCEDURE — 3008F BODY MASS INDEX DOCD: CPT | Mod: CPTII,S$GLB,, | Performed by: NURSE PRACTITIONER

## 2023-05-02 PROCEDURE — 36415 COLL VENOUS BLD VENIPUNCTURE: CPT | Performed by: NURSE PRACTITIONER

## 2023-05-02 PROCEDURE — 1159F MED LIST DOCD IN RCRD: CPT | Mod: CPTII,S$GLB,, | Performed by: NURSE PRACTITIONER

## 2023-05-02 PROCEDURE — 3080F DIAST BP >= 90 MM HG: CPT | Mod: CPTII,S$GLB,, | Performed by: NURSE PRACTITIONER

## 2023-05-02 PROCEDURE — 3077F PR MOST RECENT SYSTOLIC BLOOD PRESSURE >= 140 MM HG: ICD-10-PCS | Mod: CPTII,S$GLB,, | Performed by: NURSE PRACTITIONER

## 2023-05-02 PROCEDURE — 1159F PR MEDICATION LIST DOCUMENTED IN MEDICAL RECORD: ICD-10-PCS | Mod: CPTII,S$GLB,, | Performed by: NURSE PRACTITIONER

## 2023-05-02 PROCEDURE — 4010F PR ACE/ARB THEARPY RXD/TAKEN: ICD-10-PCS | Mod: CPTII,S$GLB,, | Performed by: NURSE PRACTITIONER

## 2023-05-02 PROCEDURE — 3008F PR BODY MASS INDEX (BMI) DOCUMENTED: ICD-10-PCS | Mod: CPTII,S$GLB,, | Performed by: NURSE PRACTITIONER

## 2023-05-02 PROCEDURE — 83880 ASSAY OF NATRIURETIC PEPTIDE: CPT | Performed by: NURSE PRACTITIONER

## 2023-05-02 PROCEDURE — 99999 PR PBB SHADOW E&M-EST. PATIENT-LVL V: CPT | Mod: PBBFAC,,, | Performed by: NURSE PRACTITIONER

## 2023-05-02 PROCEDURE — 83036 HEMOGLOBIN GLYCOSYLATED A1C: CPT | Performed by: NURSE PRACTITIONER

## 2023-05-02 RX ORDER — ATORVASTATIN CALCIUM 40 MG/1
40 TABLET, FILM COATED ORAL NIGHTLY
Qty: 90 TABLET | Refills: 4 | Status: SHIPPED | OUTPATIENT
Start: 2023-05-02

## 2023-05-02 RX ORDER — AMLODIPINE BESYLATE 10 MG/1
10 TABLET ORAL DAILY
Qty: 30 TABLET | Refills: 11 | Status: SHIPPED | OUTPATIENT
Start: 2023-05-02 | End: 2024-05-01

## 2023-05-02 RX ORDER — TORSEMIDE 20 MG/1
60 TABLET ORAL DAILY
Qty: 90 TABLET | Refills: 4 | Status: SHIPPED | OUTPATIENT
Start: 2023-05-02 | End: 2024-04-26

## 2023-05-02 RX ORDER — HYDRALAZINE HYDROCHLORIDE 25 MG/1
50 TABLET, FILM COATED ORAL 3 TIMES DAILY
Qty: 180 TABLET | Refills: 11 | Status: SHIPPED | OUTPATIENT
Start: 2023-05-02 | End: 2024-05-01

## 2023-05-02 RX ORDER — DAPAGLIFLOZIN 10 MG/1
10 TABLET, FILM COATED ORAL DAILY
Qty: 90 TABLET | Refills: 3 | Status: SHIPPED | OUTPATIENT
Start: 2023-05-02

## 2023-05-02 RX ORDER — ISOSORBIDE DINITRATE 40 MG/1
40 TABLET ORAL 2 TIMES DAILY
Qty: 180 TABLET | Refills: 3 | Status: SHIPPED | OUTPATIENT
Start: 2023-05-02 | End: 2024-04-26

## 2023-05-02 RX ORDER — SPIRONOLACTONE 25 MG/1
12.5 TABLET ORAL DAILY
Qty: 45 TABLET | Refills: 11 | Status: SHIPPED | OUTPATIENT
Start: 2023-05-02 | End: 2024-05-01

## 2023-05-02 NOTE — PROGRESS NOTES
Subjective:   Patient ID:  Vadim Devries III is a 47 y.o. male who presents for evaluation of Hypertension and Congestive Heart Failure      HPI    Vadim Devries III is a 47 year old male who presents to clinic for 6 month follow up. His current medical conditions include CHF, EF improved to 50%, DM type 2, HTN, HLP, H/O CVA, Seizures, TIA, debility, wheelchair bound. He returns today and states he is doing ok.     He continues to have issues with elevated BP readings today in office. Has been having -200 at home recently.     Reports compliance with medications and dietary restrictions.     He does endorse an episode over the weekend with chest discomfort and elevated BP readings. He has not been monitoring his glucose regularly at home.     Denies chest pain or anginal equivalents. No shortness of breath, GUADALUPE or palpitations. Denies orthopnea, PND or abdominal bloating. NO leg swelling or claudications. Reports compliance with medications and dietary restrictions. NO CNS complaints to suggest TIA or CVA today. No signs of abnormal bleeding on ASA daily.     He requires the use of power wheelchair due to inablity to walk.   This visit is for mobility evaluation.    Height: 6 feet  Weight: 84.5 kg    Patient has paralaysis and requires use of wheelchair  in order to complete his activities such as eating, toileting, grooming. 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     He cannot ambulate any amount of distance without a power chair.     A power chair will improve his quality of life and ability to participate in activities.     Patient is willing and able to perform bathing, toileting grooming and eating with a power chair.     Patient is unable to use a scooter due to inability to stand and walk. He cannot maintain postural stability in order to use a tiller steering system due to debility and weakness.        Upper Extremity Strength 2/5  Lower extremity strength 2/5       He cannot ambulate without a power chair. His CHF, diabetes, paralysis and h/o CVA limit him from propelling a manual chair throughout the day.         Past Medical History:   Diagnosis Date    Chronic combined systolic and diastolic heart failure 12/1/2021    Diabetes mellitus     Hypertension     Seizures     Stroke        History reviewed. No pertinent surgical history.    Social History     Tobacco Use    Smoking status: Never    Smokeless tobacco: Never   Substance Use Topics    Alcohol use: No    Drug use: No       History reviewed. No pertinent family history.    Wt Readings from Last 3 Encounters:   05/02/23 94.8 kg (208 lb 15.9 oz)   02/17/22 94.8 kg (208 lb 15.9 oz)   12/09/21 99 kg (218 lb 4.1 oz)     Temp Readings from Last 3 Encounters:   02/17/22 98.2 °F (36.8 °C) (Oral)   12/01/21 97.4 °F (36.3 °C) (Oral)   03/31/21 98.4 °F (36.9 °C) (Oral)     BP Readings from Last 3 Encounters:   05/02/23 (!) 180/90   06/22/22 (!) 200/110   06/22/22 (!) 200/120     Pulse Readings from Last 3 Encounters:   05/02/23 96   06/22/22 86   06/22/22 98       Current Outpatient Medications on File Prior to Visit   Medication Sig Dispense Refill    aspirin 81 MG Chew Take 81 mg by mouth.      carvediloL (COREG) 25 MG tablet Take 1 tablet (25 mg total) by mouth 2 (two) times daily. 180 tablet 3    cloNIDine (CATAPRES) 0.1 MG tablet Take 1 tablet (0.1 mg total) by mouth 2 (two) times daily. 180 tablet 0    metFORMIN (GLUCOPHAGE) 500 MG tablet Take 1 tablet (500 mg total) by mouth 2 (two) times daily with meals. 60 tablet 0    phenytoin (DILANTIN) 100 MG ER capsule Take 2 capsules (200 mg total) by mouth 3 (three) times daily. 180 capsule 0    potassium chloride SA (K-DUR,KLOR-CON) 20 MEQ tablet Take 1 tablet (20 mEq total) by mouth once daily. 90 tablet 0    [DISCONTINUED] atorvastatin (LIPITOR) 40 MG tablet Take 40 mg by mouth.      [DISCONTINUED]  dapagliflozin (FARXIGA) 10 mg tablet Take 1 tablet (10 mg total) by mouth once daily. 90 tablet 3    [DISCONTINUED] hydrALAZINE (APRESOLINE) 25 MG tablet Take 1 tablet (25 mg total) by mouth 3 (three) times daily. 90 tablet 11    [DISCONTINUED] sacubitriL-valsartan (ENTRESTO)  mg per tablet Take 1 tablet by mouth 2 (two) times daily. 60 tablet 2    [DISCONTINUED] spironolactone (ALDACTONE) 25 MG tablet Take 0.5 tablets (12.5 mg total) by mouth once daily. 45 tablet 0    doxepin (SINEQUAN) 25 MG capsule Take 1 capsule (25 mg total) by mouth every evening. 30 capsule 0    gabapentin (NEURONTIN) 300 MG capsule Take 1 capsule (300 mg total) by mouth 3 (three) times daily as needed (as needed for pain). 42 capsule 0    [DISCONTINUED] isosorbide dinitrate (ISORDIL) 40 MG Tab Take 1 tablet (40 mg total) by mouth 2 (two) times daily. 180 tablet 0    [DISCONTINUED] labetaloL (NORMODYNE) 100 MG tablet Take 1 tablet (100 mg total) by mouth 2 (two) times daily. 30 tablet 0    [DISCONTINUED] lisinopriL 10 MG tablet Take 1 tablet (10 mg total) by mouth once daily. 30 tablet 0    [DISCONTINUED] torsemide (DEMADEX) 20 MG Tab Take 3 tablets (60 mg total) by mouth once daily. 90 tablet 0     No current facility-administered medications on file prior to visit.       No cardiac monitor results found for the past 12 months    Results for orders placed during the hospital encounter of 06/22/22    Echo Saline Bubble? No    Interpretation Summary  · The left ventricle is normal in size with concentric hypertrophy and low normal systolic function.  · Mild pulmonic regurgitation.  · The estimated ejection fraction is 50%.  · Normal left ventricular diastolic function.  · Normal right ventricular size with normal right ventricular systolic function.  · Mild left atrial enlargement.  · Normal central venous pressure (3 mmHg).  · The estimated PA systolic pressure is 29 mmHg.    Results for orders placed during the hospital encounter of  11/27/21    Nuclear Stress - Cardiology Interpreted    Interpretation Summary    Normal myocardial perfusion scan. There is no evidence of myocardial ischemia or infarction.    There is mild apical thinning which is a normal variant.    The gated perfusion images showed an ejection fraction of 22% at rest. The gated perfusion images showed an ejection fraction of 22% post stress. Normal ejection fraction is greater than 59%.    There is severe global hypokinesis at rest and stress.    The EKG portion of this study is negative for ischemia.        Results for orders placed or performed during the hospital encounter of 02/17/22   EKG 12-lead    Collection Time: 02/17/22 12:16 PM    Narrative    Test Reason : I10,    Vent. Rate : 079 BPM     Atrial Rate : 079 BPM     P-R Int : 156 ms          QRS Dur : 090 ms      QT Int : 382 ms       P-R-T Axes : 040 026 049 degrees     QTc Int : 438 ms    Normal sinus rhythm  Nonspecific T wave abnormality  Abnormal ECG  When compared with ECG of 30-NOV-2021 11:14,  Previous ECG has undetermined rhythm, needs review  T wave inversion no longer evident in Anterior leads  QT has shortened  Confirmed by GEORGIANA CHEUNG MD ([id number]) on 2/17/2022 2:21:38 PM    Referred By: [email or web address]   SELF           Confirmed By:GEORGIANA CHEUNG MD         Review of Systems   Constitutional: Positive for malaise/fatigue.   HENT:  Negative for hearing loss and hoarse voice.    Eyes:  Negative for blurred vision and visual disturbance.   Cardiovascular:  Positive for chest pain and dyspnea on exertion. Negative for claudication, irregular heartbeat, leg swelling, near-syncope, orthopnea, palpitations, paroxysmal nocturnal dyspnea and syncope.   Respiratory:  Negative for cough, hemoptysis, shortness of breath, sleep disturbances due to breathing, snoring and wheezing.    Endocrine: Negative for cold intolerance and heat intolerance.   Hematologic/Lymphatic: Does not bruise/bleed easily.   Skin:  Negative for  color change, dry skin and nail changes.   Musculoskeletal:  Positive for arthritis and back pain. Negative for joint pain and myalgias.        Wheelchair dependent   Gastrointestinal:  Negative for bloating, abdominal pain, constipation, nausea and vomiting.   Genitourinary:  Negative for dysuria, flank pain, hematuria and hesitancy.   Neurological:  Negative for headaches, light-headedness, loss of balance, numbness, paresthesias and weakness.   Psychiatric/Behavioral:  Negative for altered mental status.    Allergic/Immunologic: Negative for environmental allergies.       Objective:BP (!) 180/90   Pulse 96   Resp 16   Ht 6' (1.829 m)   Wt 94.8 kg (208 lb 15.9 oz)   SpO2 100%   BMI 28.34 kg/m²      Physical Exam  Vitals and nursing note reviewed.   Constitutional:       General: He is not in acute distress.     Appearance: Normal appearance. He is well-developed. He is not ill-appearing.   HENT:      Head: Normocephalic and atraumatic.      Nose: Nose normal.      Mouth/Throat:      Mouth: Mucous membranes are moist.   Eyes:      Pupils: Pupils are equal, round, and reactive to light.   Neck:      Thyroid: No thyromegaly.      Vascular: No JVD.      Trachea: No tracheal deviation.   Cardiovascular:      Rate and Rhythm: Normal rate and regular rhythm.      Chest Wall: PMI is not displaced.      Pulses: Intact distal pulses.           Radial pulses are 2+ on the right side and 2+ on the left side.        Dorsalis pedis pulses are 2+ on the right side and 2+ on the left side.      Heart sounds: S1 normal and S2 normal. Heart sounds not distant. No murmur heard.  Pulmonary:      Effort: Pulmonary effort is normal. No respiratory distress.      Breath sounds: Normal breath sounds and air entry. No decreased breath sounds, wheezing or rhonchi.   Abdominal:      General: Bowel sounds are normal. There is no distension.      Palpations: Abdomen is soft.      Tenderness: There is no abdominal tenderness.    Musculoskeletal:         General: No swelling. Normal range of motion.      Cervical back: Full passive range of motion without pain, normal range of motion and neck supple.      Right lower leg: No edema.      Left lower leg: No edema.      Right ankle: No swelling.      Left ankle: No swelling.   Skin:     General: Skin is warm and dry.      Capillary Refill: Capillary refill takes less than 2 seconds.      Nails: There is no clubbing.   Neurological:      General: No focal deficit present.      Mental Status: He is alert and oriented to person, place, and time.      Motor: Weakness present.   Psychiatric:         Speech: Speech normal.         Behavior: Behavior normal.         Thought Content: Thought content normal.         Judgment: Judgment normal.       Lab Results   Component Value Date    CHOL 144 11/28/2021     Lab Results   Component Value Date    HDL 29 (L) 11/28/2021     Lab Results   Component Value Date    LDLCALC 100.8 11/28/2021     Lab Results   Component Value Date    TRIG 83 11/30/2021    TRIG 71 11/28/2021     Lab Results   Component Value Date    CHOLHDL 20.1 11/28/2021       Chemistry        Component Value Date/Time     06/22/2022 1303    K 3.6 06/22/2022 1303     06/22/2022 1303    CO2 30 (H) 06/22/2022 1303    BUN 20 06/22/2022 1303    CREATININE 1.7 (H) 06/22/2022 1303     (H) 06/22/2022 1303        Component Value Date/Time    CALCIUM 9.1 06/22/2022 1303    ALKPHOS 125 02/17/2022 1237    AST 20 02/17/2022 1237    ALT 12 02/17/2022 1237    BILITOT 0.5 02/17/2022 1237    ESTGFRAFRICA 54 (A) 06/22/2022 1303    EGFRNONAA 47 (A) 06/22/2022 1303          Lab Results   Component Value Date    TSH 0.910 11/28/2021     Lab Results   Component Value Date    INR 1.0 11/27/2021    INR 1.0 04/11/2017     Lab Results   Component Value Date    WBC 7.52 02/17/2022    HGB 13.7 (L) 02/17/2022    HCT 43.4 02/17/2022    MCV 79 (L) 02/17/2022     02/17/2022          Assessment:       1. Uncontrolled hypertension    2. Type 2 diabetes mellitus without complication, unspecified whether long term insulin use    3. Chronic combined systolic and diastolic heart failure    4. Chronic HFrEF (heart failure with reduced ejection fraction)    5. Hypertension associated with diabetes    6. KEIKO (obstructive sleep apnea)    7. Paralysis        Plan:   CMP BNP A1C today  Power Wheelchair for home use  Add Norvasc 10 mg daily  Increase hydralazine to 50 mg TID  Continue Entresto, Aldactone, Torsemide Farxiga Coreg  Referral to Internal medicine  Referral to Diabetes  Dash diet 2 gm sodium restriction  1500 ml fluid restriction  RTC in 1 month for BP check    ARBEN Maddoxsfelicia Arrhythmia

## 2023-05-10 ENCOUNTER — PATIENT MESSAGE (OUTPATIENT)
Dept: PHARMACY | Facility: CLINIC | Age: 48
End: 2023-05-10
Payer: COMMERCIAL

## 2023-05-12 ENCOUNTER — TELEPHONE (OUTPATIENT)
Dept: CARDIOLOGY | Facility: CLINIC | Age: 48
End: 2023-05-12
Payer: COMMERCIAL

## 2023-05-15 ENCOUNTER — TELEPHONE (OUTPATIENT)
Dept: CARDIOLOGY | Facility: CLINIC | Age: 48
End: 2023-05-15
Payer: COMMERCIAL

## 2023-05-15 NOTE — TELEPHONE ENCOUNTER
Spoke with patient and the patient stated that his pressure has been high and he has felt sleepy and fatigued.   Sat pm 217/145  Sun pm 228/153  Sun night 236/160    The patient also stated that he needs the paperwork for a new chair completed by the end of the month.  Please advise

## 2023-05-15 NOTE — TELEPHONE ENCOUNTER
Attempted to reach patient, GILBERT              Has he been logging his BP in AM and PM-- can he send log with HR to review.     Is he taking all his medications? His BP is still very high.

## 2023-05-16 ENCOUNTER — TELEPHONE (OUTPATIENT)
Dept: CARDIOLOGY | Facility: CLINIC | Age: 48
End: 2023-05-16
Payer: COMMERCIAL

## 2023-05-16 NOTE — TELEPHONE ENCOUNTER
Patient contacted to find out what his BP readings were:      05/15/2023   Evening, 213/132 patient did not provide the Pulse. He is currently at work.     The 05/16/2023   AM BP readings were 203/140 Pulse not provided.     The patient was asked what medications did he take today?    He stated he took:  Amlodipine  ASA  Carvedilol  Hydralazine  Isosorbide  Torsemide     The clonidine he will take in the evening, it makes him feel drowsy.          ----- Message from Monique Kaur sent at 5/16/2023 11:12 AM CDT -----  Contact: Vadim Olson is returning the phone call. Please call him back at [phone].     Thanks  TS

## 2023-06-20 ENCOUNTER — TELEPHONE (OUTPATIENT)
Dept: CARDIOLOGY | Facility: CLINIC | Age: 48
End: 2023-06-20
Payer: COMMERCIAL

## 2023-06-29 RX ORDER — CLONIDINE HYDROCHLORIDE 0.1 MG/1
0.1 TABLET ORAL 2 TIMES DAILY
Qty: 180 TABLET | Refills: 0 | Status: SHIPPED | OUTPATIENT
Start: 2023-06-29 | End: 2024-06-28

## 2023-07-07 ENCOUNTER — TELEPHONE (OUTPATIENT)
Dept: CARDIOLOGY | Facility: CLINIC | Age: 48
End: 2023-07-07
Payer: COMMERCIAL

## 2023-07-11 ENCOUNTER — PATIENT MESSAGE (OUTPATIENT)
Dept: CARDIOLOGY | Facility: CLINIC | Age: 48
End: 2023-07-11
Payer: COMMERCIAL

## 2023-07-11 ENCOUNTER — TELEPHONE (OUTPATIENT)
Dept: CARDIOLOGY | Facility: CLINIC | Age: 48
End: 2023-07-11
Payer: COMMERCIAL

## 2023-07-11 NOTE — TELEPHONE ENCOUNTER
Patient Vadim Gipson called in regarding paperwork for his mobility chair. Patient stated he received a call from [facility] advising that POLO Smith completed the paperwork incorrectly. Patient stated the paperwork submitted to [facility] states, the patient HAS been walking for 13 years when it should say, the patient HAS NOT walked in 13 years. Patient stated he needs the form corrected and submitted back to Pita at [facility]. Pita can be reached at [phone]. Patient is requesting for staff to reach out to him with an update on the paperwork.    LM for pt to call us back - we updated paperwork once then faxed it back - still was not enough info- [facility] sent us back in a fax but we never got the fax - I spoke with Pita today and she had to end up emailing to me- the fax never came through.  We have the paperwork and know what is needed- Shelly Brewer NP will fill out and we will be able to send completed paperwork back to Pita at [facility] by tomorrow 7/12/2023    Will send via [employer] as well

## 2023-07-31 ENCOUNTER — OFFICE VISIT (OUTPATIENT)
Dept: DIABETES | Facility: CLINIC | Age: 48
End: 2023-07-31
Payer: COMMERCIAL

## 2023-07-31 ENCOUNTER — TELEPHONE (OUTPATIENT)
Dept: DIABETES | Facility: CLINIC | Age: 48
End: 2023-07-31
Payer: COMMERCIAL

## 2023-07-31 DIAGNOSIS — I10 UNCONTROLLED HYPERTENSION: ICD-10-CM

## 2023-07-31 DIAGNOSIS — E11.9 TYPE 2 DIABETES MELLITUS WITHOUT COMPLICATION, UNSPECIFIED WHETHER LONG TERM INSULIN USE: ICD-10-CM

## 2023-07-31 DIAGNOSIS — E11.59 HYPERTENSION ASSOCIATED WITH DIABETES: ICD-10-CM

## 2023-07-31 DIAGNOSIS — I15.2 HYPERTENSION ASSOCIATED WITH DIABETES: ICD-10-CM

## 2023-07-31 DIAGNOSIS — I50.42 CHRONIC COMBINED SYSTOLIC AND DIASTOLIC HEART FAILURE: ICD-10-CM

## 2023-07-31 DIAGNOSIS — Z86.73 HISTORY OF STROKE: Primary | ICD-10-CM

## 2023-07-31 PROBLEM — N18.31 STAGE 3A CHRONIC KIDNEY DISEASE: Status: ACTIVE | Noted: 2022-09-17

## 2023-07-31 PROCEDURE — 4010F PR ACE/ARB THEARPY RXD/TAKEN: ICD-10-PCS | Mod: CPTII,95,, | Performed by: NURSE PRACTITIONER

## 2023-07-31 PROCEDURE — 4010F ACE/ARB THERAPY RXD/TAKEN: CPT | Mod: CPTII,95,, | Performed by: NURSE PRACTITIONER

## 2023-07-31 PROCEDURE — 99204 OFFICE O/P NEW MOD 45 MIN: CPT | Mod: 95,,, | Performed by: NURSE PRACTITIONER

## 2023-07-31 PROCEDURE — 3052F HG A1C>EQUAL 8.0%<EQUAL 9.0%: CPT | Mod: CPTII,95,, | Performed by: NURSE PRACTITIONER

## 2023-07-31 PROCEDURE — 99204 PR OFFICE/OUTPT VISIT, NEW, LEVL IV, 45-59 MIN: ICD-10-PCS | Mod: 95,,, | Performed by: NURSE PRACTITIONER

## 2023-07-31 PROCEDURE — 3052F PR MOST RECENT HEMOGLOBIN A1C LEVEL 8.0 - < 9.0%: ICD-10-PCS | Mod: CPTII,95,, | Performed by: NURSE PRACTITIONER

## 2023-07-31 RX ORDER — PEN NEEDLE, DIABETIC 30 GX3/16"
NEEDLE, DISPOSABLE MISCELLANEOUS
Qty: 100 EACH | Refills: 5 | Status: SHIPPED | OUTPATIENT
Start: 2023-07-31

## 2023-07-31 RX ORDER — BLOOD-GLUCOSE SENSOR
1 EACH MISCELLANEOUS
Qty: 3 EACH | Refills: 11 | Status: SHIPPED | OUTPATIENT
Start: 2023-07-31 | End: 2024-07-30

## 2023-07-31 RX ORDER — BLOOD-GLUCOSE,RECEIVER,CONT
1 EACH MISCELLANEOUS DAILY
Qty: 1 EACH | Refills: 0 | Status: SHIPPED | OUTPATIENT
Start: 2023-07-31 | End: 2024-07-30

## 2023-07-31 RX ORDER — INSULIN LISPRO 200 [IU]/ML
5 INJECTION, SOLUTION SUBCUTANEOUS
Qty: 3 ML | Refills: 5 | Status: SHIPPED | OUTPATIENT
Start: 2023-07-31 | End: 2024-07-30

## 2023-07-31 NOTE — PROGRESS NOTES
"Telemedicine Visit    The patient location is home  The chief complaint leading to consultation is: Diabetes    Visit type: audiovisual    Face to Face time with patient: 30  60 minutes of total time spent on the encounter, which includes face to face time and non-face to face time preparing to see the patient (eg, review of tests), Obtaining and/or reviewing separately obtained history, Documenting clinical information in the electronic or other health record, Independently interpreting results (not separately reported) and communicating results to the patient/family/caregiver, or Care coordination (not separately reported).  Each patient to whom he or she provides medical services by telemedicine is:  (1) informed of the relationship between the physician and patient and the respective role of any other health care provider with respect to management of the patient; and (2) notified that he or she may decline to receive medical services by telemedicine and may withdraw from such care at any time.  Notes:         Vadim Devries III is a 48 y.o. male who  has a past medical history of Chronic combined systolic and diastolic heart failure (12/1/2021), Diabetes mellitus, Hypertension, Seizures, and Stroke., who present for an initial evaluation of Type 2 diabetes mellitus.     CHIEF COMPLAINT: Diabetes Consultation    PCP: Petr Mckeon MD - states "in-between" PCPs.     The patient was initially diagnosed with diabetes 20 years ago.     Previous failed treatments include:  NONE.     Social Documentation:  Patient lives in [place]. Lives alone.   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  Exercise: Limited due to WC bound 2/2 CVA in 2010 and mulitple TIAs over last 20 years and neuropathy.   Does not drive, has to arrange transportation for office visits.     Current monitoring regimen:  not checking.      Current Diabetes related symptoms/problems include hyperglycemia, paresthesia of the feet, polydipsia, and " "visual disturbances and have been unchanged.     Diabetes related complications:   nephropathy, peripheral neuropathy, and cerebrovascular disease.   denies Pancreatitis  denies Gastroparesis  denies DKA  denies Hx/family Hx of MEN2/MTC  denies Frequent UTIs/yeast infections    Cardiovascular Risk Factors: dyslipidemia, hypertension, male gender, microalbuminuria, obesity (BMI >30 kg/m2), and sedentary lifestyle.    Any episodes of hypoglycemia?  No.   Hypoglycemia Unawareness? No  Severe hypoglycemia requiring 3rd party? No    Seen by Diabetes Education in last year? no    Diabetes Medications               dapagliflozin (FARXIGA) 10 mg tablet Take 1 tablet (10 mg total) by mouth once daily.    metFORMIN (GLUCOPHAGE) 500 MG tablet Take 1 tablet (500 mg total) by mouth 2 (two) times daily with meals. - HAS NOT TAKEN SINCE MAY.     semaglutide (OZEMPIC) 0.25 mg or 0.5 mg (2 mg/3 mL) pen injector Inject 0.25 mg into the skin every 7 days. - NEVER STARTED.     Metformin GFR Parameters:  eGFR ?60: No dosage adjustment necessary. Monitor renal function at least annually.   eGFR >45: No dosage adjustment necessary. Monitor renal function to every 3 to 6 months.   eGFR 30 to 45:  Initiation not recommended.   Continuation OK at 500 mg by mouth twice daily. Monitor renal function every 3 months.   eGFR <30: Use is contraindicated.     DIABETES DISEASE MANAGEMENT STATUS  Statin: Taking  ACE/ARB: Not taking  Screening or Prevention Patient's value Goal Complete/Controlled?   HgA1C Testing and Control   Lab Results   Component Value Date    HGBA1C 8.8 (H) 05/02/2023      Annually/Less than 8% No   Lipid profile : 09/17/2022 Annually No   LDL control Lab Results   Component Value Date    LDLCALC 145 (H) 09/17/2022    Annually/Less than 100 mg/dl  No   Nephropathy screening No results found for: "LABMICR"  Lab Results   Component Value Date    PROTEINUA 2+ (A) 02/17/2022     No results found for: "UTPCR"   Annually No   Blood " "pressure BP Readings from Last 1 Encounters:   05/02/23 (!) 180/90    Less than 140/90 No   Dilated retinal exam Most Recent Eye Exam Date: Not Found Annually No   Foot exam   Most Recent Foot Exam Date: Not Found Annually No   Patient's medications, allergies, past medical, surgical, social and family histories were reviewed and updated as appropriate.     Review of Systems   Constitutional:  Negative for weight loss.   Eyes:  Negative for blurred vision and double vision.   Cardiovascular:  Negative for chest pain.   Gastrointestinal:  Negative for nausea and vomiting.   Genitourinary:  Negative for frequency.   Musculoskeletal:  Negative for falls.   Neurological:  Negative for dizziness and weakness.   Endo/Heme/Allergies:  Negative for polydipsia.   Psychiatric/Behavioral:  Negative for depression.    All other systems reviewed and are negative.         Physical Exam  Constitutional:       General: He is not in acute distress.     Appearance: Normal appearance.   Pulmonary:      Effort: No respiratory distress.   Neurological:      Mental Status: He is alert and oriented to person, place, and time.   Psychiatric:         Mood and Affect: Mood normal.         Behavior: Behavior normal.        There were no vitals taken for this visit.  Wt Readings from Last 3 Encounters:   05/02/23 94.8 kg (208 lb 15.9 oz)   02/17/22 94.8 kg (208 lb 15.9 oz)   12/09/21 99 kg (218 lb 4.1 oz)       LAB REVIEW  Lab Results   Component Value Date     05/02/2023    K 4.1 05/02/2023     05/02/2023    CO2 28 05/02/2023    BUN 18 05/02/2023    CREATININE 1.6 (H) 05/02/2023    CALCIUM 9.0 05/02/2023    ANIONGAP 8 05/02/2023    EGFRNORACEVR 52 (A) 05/02/2023     No results found for: "CPEPTIDE", "GLUTAMICACID", "INSLNABS"  Hemoglobin A1C   Date Value Ref Range Status   05/02/2023 8.8 (H) 4.0 - 5.6 % Final     Comment:     ADA Screening Guidelines:  5.7-6.4%  Consistent with prediabetes  >or=6.5%  Consistent with " "diabetes    High levels of fetal hemoglobin interfere with the HbA1C  assay. Heterozygous hemoglobin variants (HbS, HgC, etc)do  not significantly interfere with this assay.   However, presence of multiple variants may affect accuracy.     09/16/2022 8.9 (H) <=6.5 % Final   11/28/2021 7.3 (H) 4.0 - 5.6 % Final     Comment:     ADA Screening Guidelines:  5.7-6.4%  Consistent with prediabetes  >or=6.5%  Consistent with diabetes    High levels of fetal hemoglobin interfere with the HbA1C  assay. Heterozygous hemoglobin variants (HbS, HgC, etc)do  not significantly interfere with this assay.   However, presence of multiple variants may affect accuracy.         ASSESSMENT    ICD-10-CM ICD-9-CM   1. History of stroke  Z86.73 V12.54   2. Uncontrolled hypertension  I10 401.9   3. Type 2 diabetes mellitus without complication, unspecified whether long term insulin use  E11.9 250.00   4. Hypertension associated with diabetes  E11.59 250.80    I15.2 401.9   5. Chronic combined systolic and diastolic heart failure  I50.42 428.42        PLAN  Diagnoses and all orders for this visit:    History of stroke  -     Ambulatory referral/consult to Internal Medicine; Future    Uncontrolled hypertension  -     Ambulatory referral/consult to Diabetic Advanced Practice Providers (Medical Management)    Type 2 diabetes mellitus without complication, unspecified whether long term insulin use  -     Ambulatory referral/consult to Diabetic Advanced Practice Providers (Medical Management)  -     Ambulatory referral/consult to Internal Medicine; Future  -     Ambulatory referral/consult to Diabetes Education; Future  -     insulin lispro (HUMALOG KWIKPEN INSULIN) 200 unit/mL (3 mL) InPn; Inject 5 Units into the skin 3 (three) times daily before meals. FOR GLUCOSE OVER 200.  -     pen needle, diabetic (BD ULTRA-FINE LUI PEN NEEDLE) 32 gauge x 5/32" Ndle; Use with insulin 3 times daily  -     blood-glucose sensor (DEXCOM G7 SENSOR) Milly; 1 Device " by Misc.(Non-Drug; Combo Route) route every 10 days.  -     blood-glucose meter,continuous (DEXCOM G7 ) Misc; 1 Device by Misc.(Non-Drug; Combo Route) route once daily.  -     CBC Without Differential; Future  -     Comprehensive Metabolic Panel; Future  -     Hemoglobin A1C; Future  -     Lipid Panel; Future  -     Microalbumin/Creatinine Ratio, Urine; Future    Hypertension associated with diabetes  -     Ambulatory referral/consult to Internal Medicine; Future    Chronic combined systolic and diastolic heart failure        Reviewed pathophysiology of diabetes, complications related to the disease, importance of annual dilated eye exam and daily foot examination. Explained MOA, SE, dosage of medications. Written instructions given and reviewed with patient and patient verbalizes understanding.     PATIENT INSTRUCTIONS    Will schedule appointment with primary care.   Lifestyle modification with diabetic diet and at least 30 minutes of physical activity daily recommended.   Diabetic Foot Exam deferred today due to virtual visit. Will plan to be done at next in-person visit.   You are overdue for your yearly diabetic eye exam. Please schedule an appointment with your eye care provider at your earliest convenience. If your eye care provider is outside of the [facility] system, please have them fax a report of the exam to [facility] Fax [phone].    Fasting labs to be done 1 week prior to follow up appointment with me.    Refer to diabetes education.      Continue Farxiga 10 mg by mouth daily.   Do not restart Metformin until we recheck your kidney function.   Discard your unused Ozempic - if it was not refrigerated, it is no longer good.   Start Novolog 5 units subcutaneously three times daily with meals for glucose over 200. +    Dexcom G7 CGM ordered. Patient is on an intensive insulin regimen with [facility]. Patient has demonstrated an understanding of technology and is motivated to use the  device correctly. Patient is expected to adhere to a diabetes treatment plan and is capable of recognizing alerts and alarms.    Please let me know as soon as you receive your continuous glucose monitoring supplies so we can get your training scheduled with our diabetic educators.      Blood Sugar Goals:       Fastin-130.       1-2 hours after a meal: Less than 180.     Follow up in about 4 weeks (around 2023) for VIRTUAL, SCHEDULE FASTING LABS, DEXCOM - SHARING, SCHEDULE DM EDUCATION.    Portions of this note were prepared with [employer] Speaking voice recognition transcription software. Grammatical errors, including garbled syntax, mangle pronouns, and other bizarre constructions may be attributed to that software system.

## 2023-07-31 NOTE — TELEPHONE ENCOUNTER
Attempted to call pt to schedule   virtual 4 wk follow up  Fasting labs before f/u @ [facility]   Appt with internal med. Est. Care with a pcp  Left vm

## 2023-07-31 NOTE — PATIENT INSTRUCTIONS
PATIENT INSTRUCTIONS    Will schedule appointment with primary care.   Lifestyle modification with diabetic diet and at least 30 minutes of physical activity daily recommended.   Diabetic Foot Exam deferred today due to virtual visit. Will plan to be done at next in-person visit.   You are overdue for your yearly diabetic eye exam. Please schedule an appointment with your eye care provider at your earliest convenience. If your eye care provider is outside of the [facility] system, please have them fax a report of the exam to [facility] Fax [phone].    Fasting labs to be done 1 week prior to follow up appointment with me.    Refer to diabetes education.      Continue Farxiga 10 mg by mouth daily.   Do not restart Metformin until we recheck your kidney function.   Discard your unused Ozempic - if it was not refrigerated, it is no longer good.   Start Novolog 5 units subcutaneously three times daily with meals for glucose over 200. +    Dexcom G7 CGM ordered. Patient is on an intensive insulin regimen with [facility]. Patient has demonstrated an understanding of technology and is motivated to use the device correctly. Patient is expected to adhere to a diabetes treatment plan and is capable of recognizing alerts and alarms.    Please let me know as soon as you receive your continuous glucose monitoring supplies so we can get your training scheduled with our diabetic educators.      Blood Sugar Goals:       Fastin-130.       1-2 hours after a meal: Less than 180.

## 2023-07-31 NOTE — Clinical Note
VIRTUAL, 4 WKS SCHEDULE FASTING LABS PRIOR TO F/U AT [facility] DEXCOM ORDERED TODAY - SENT TO [facility] SCHEDULE DM EDUCATION SCHEDULE APPT WITH IM TO ESTABLISH CARE WITH PCP

## 2023-08-01 ENCOUNTER — TELEPHONE (OUTPATIENT)
Dept: DIABETES | Facility: CLINIC | Age: 48
End: 2023-08-01
Payer: COMMERCIAL

## 2023-08-01 ENCOUNTER — TELEPHONE (OUTPATIENT)
Dept: DIABETES | Facility: CLINIC | Age: 48
End: 2023-08-01

## 2023-08-01 NOTE — TELEPHONE ENCOUNTER
Pt was instructed to call us when he receives his dexcom so that we cam sent him up with education.

## 2023-08-02 ENCOUNTER — TELEPHONE (OUTPATIENT)
Dept: PHARMACY | Facility: CLINIC | Age: 48
End: 2023-08-02
Payer: COMMERCIAL

## 2023-12-05 NOTE — TELEPHONE ENCOUNTER
Received message per [facility] VM. Pt calling in regards to paperwork for Power chair to be filled out that he dropped off in April. Please contact pt regarding above information.   Holding RN to OR RN

## 2024-07-25 DIAGNOSIS — E11.9 TYPE 2 DIABETES MELLITUS WITHOUT COMPLICATION, UNSPECIFIED WHETHER LONG TERM INSULIN USE: ICD-10-CM

## 2024-07-25 DIAGNOSIS — I15.2 HYPERTENSION ASSOCIATED WITH DIABETES: ICD-10-CM

## 2024-07-25 DIAGNOSIS — I50.42 CHRONIC COMBINED SYSTOLIC AND DIASTOLIC HEART FAILURE: ICD-10-CM

## 2024-07-25 DIAGNOSIS — I50.22 CHRONIC HFREF (HEART FAILURE WITH REDUCED EJECTION FRACTION): ICD-10-CM

## 2024-07-25 DIAGNOSIS — I10 UNCONTROLLED HYPERTENSION: ICD-10-CM

## 2024-07-25 DIAGNOSIS — E11.59 HYPERTENSION ASSOCIATED WITH DIABETES: ICD-10-CM

## 2024-07-25 RX ORDER — TORSEMIDE 20 MG/1
60 TABLET ORAL DAILY
Qty: 90 TABLET | Refills: 4 | OUTPATIENT
Start: 2024-07-25 | End: 2024-08-24

## 2024-07-25 RX ORDER — CARVEDILOL 25 MG/1
25 TABLET ORAL 2 TIMES DAILY
Qty: 180 TABLET | Refills: 3 | OUTPATIENT
Start: 2024-07-25 | End: 2025-08-22

## 2024-07-25 RX ORDER — SACUBITRIL AND VALSARTAN 97; 103 MG/1; MG/1
1 TABLET, FILM COATED ORAL 2 TIMES DAILY
Qty: 60 TABLET | Refills: 11 | OUTPATIENT
Start: 2024-07-25

## 2024-07-25 RX ORDER — DAPAGLIFLOZIN 10 MG/1
10 TABLET, FILM COATED ORAL DAILY
Qty: 90 TABLET | Refills: 3 | OUTPATIENT
Start: 2024-07-25

## 2024-07-25 RX ORDER — ATORVASTATIN CALCIUM 40 MG/1
40 TABLET, FILM COATED ORAL NIGHTLY
Qty: 90 TABLET | Refills: 4 | OUTPATIENT
Start: 2024-07-25

## 2024-07-25 RX ORDER — AMLODIPINE BESYLATE 10 MG/1
10 TABLET ORAL DAILY
Qty: 30 TABLET | Refills: 11 | OUTPATIENT
Start: 2024-07-25 | End: 2025-07-25

## 2024-07-25 RX ORDER — CLONIDINE HYDROCHLORIDE 0.1 MG/1
0.1 TABLET ORAL 2 TIMES DAILY
Qty: 180 TABLET | Refills: 0 | OUTPATIENT
Start: 2024-07-25 | End: 2025-07-25

## 2024-07-25 RX ORDER — HYDRALAZINE HYDROCHLORIDE 25 MG/1
50 TABLET, FILM COATED ORAL 3 TIMES DAILY
Qty: 180 TABLET | Refills: 11 | OUTPATIENT
Start: 2024-07-25 | End: 2025-07-25

## 2024-07-25 RX ORDER — ISOSORBIDE DINITRATE 40 MG/1
40 TABLET ORAL 2 TIMES DAILY
Qty: 180 TABLET | Refills: 3 | OUTPATIENT
Start: 2024-07-25 | End: 2024-08-25

## 2024-09-04 ENCOUNTER — HOSPITAL ENCOUNTER (EMERGENCY)
Facility: HOSPITAL | Age: 49
Discharge: HOME | End: 2024-09-04
Attending: EMERGENCY MEDICINE
Payer: COMMERCIAL

## 2024-09-04 VITALS
HEART RATE: 87 BPM | OXYGEN SATURATION: 100 % | RESPIRATION RATE: 16 BRPM | SYSTOLIC BLOOD PRESSURE: 158 MMHG | TEMPERATURE: 99 F | WEIGHT: 214.5 LBS | BODY MASS INDEX: 29.05 KG/M2 | DIASTOLIC BLOOD PRESSURE: 85 MMHG | HEIGHT: 72 IN

## 2024-09-04 DIAGNOSIS — I50.42 CHRONIC COMBINED SYSTOLIC AND DIASTOLIC HEART FAILURE: ICD-10-CM

## 2024-09-04 DIAGNOSIS — N17.9 AKI (ACUTE KIDNEY INJURY): ICD-10-CM

## 2024-09-04 DIAGNOSIS — I16.0 HYPERTENSIVE URGENCY: ICD-10-CM

## 2024-09-04 DIAGNOSIS — I10 PRIMARY HYPERTENSION: ICD-10-CM

## 2024-09-04 DIAGNOSIS — R41.82 AMS (ALTERED MENTAL STATUS): ICD-10-CM

## 2024-09-04 DIAGNOSIS — R56.9 SEIZURE: Primary | ICD-10-CM

## 2024-09-04 DIAGNOSIS — Z91.148 NONCOMPLIANCE WITH MEDICATION REGIMEN: ICD-10-CM

## 2024-09-04 DIAGNOSIS — I50.22 CHRONIC HFREF (HEART FAILURE WITH REDUCED EJECTION FRACTION): ICD-10-CM

## 2024-09-04 DIAGNOSIS — I15.2 HYPERTENSION ASSOCIATED WITH DIABETES: ICD-10-CM

## 2024-09-04 DIAGNOSIS — E11.59 HYPERTENSION ASSOCIATED WITH DIABETES: ICD-10-CM

## 2024-09-04 LAB
ALBUMIN SERPL BCP-MCNC: 3.2 G/DL (ref 3.5–5.2)
ALP SERPL-CCNC: 108 U/L (ref 55–135)
ALT SERPL W/O P-5'-P-CCNC: 16 U/L (ref 10–44)
ANION GAP SERPL CALC-SCNC: 10 MMOL/L (ref 8–16)
AST SERPL-CCNC: 15 U/L (ref 10–40)
BASOPHILS # BLD AUTO: 0.04 K/UL (ref 0–0.2)
BASOPHILS NFR BLD: 0.5 % (ref 0–1.9)
BILIRUB SERPL-MCNC: 0.3 MG/DL (ref 0.1–1)
BUN SERPL-MCNC: 25 MG/DL (ref 6–20)
CALCIUM SERPL-MCNC: 8.7 MG/DL (ref 8.7–10.5)
CHLORIDE SERPL-SCNC: 108 MMOL/L (ref 95–110)
CO2 SERPL-SCNC: 24 MMOL/L (ref 23–29)
CREAT SERPL-MCNC: 2.7 MG/DL (ref 0.5–1.4)
DIFFERENTIAL METHOD BLD: ABNORMAL
EOSINOPHIL # BLD AUTO: 0.1 K/UL (ref 0–0.5)
EOSINOPHIL NFR BLD: 1.9 % (ref 0–8)
ERYTHROCYTE [DISTWIDTH] IN BLOOD BY AUTOMATED COUNT: 12.9 % (ref 11.5–14.5)
EST. GFR  (NO RACE VARIABLE): 28 ML/MIN/1.73 M^2
GLUCOSE SERPL-MCNC: 207 MG/DL (ref 70–110)
HCT VFR BLD AUTO: 35.2 % (ref 40–54)
HGB BLD-MCNC: 11.6 G/DL (ref 14–18)
IMM GRANULOCYTES # BLD AUTO: 0.03 K/UL (ref 0–0.04)
IMM GRANULOCYTES NFR BLD AUTO: 0.4 % (ref 0–0.5)
LYMPHOCYTES # BLD AUTO: 1.6 K/UL (ref 1–4.8)
LYMPHOCYTES NFR BLD: 21.8 % (ref 18–48)
MCH RBC QN AUTO: 26.8 PG (ref 27–31)
MCHC RBC AUTO-ENTMCNC: 33 G/DL (ref 32–36)
MCV RBC AUTO: 81 FL (ref 82–98)
MONOCYTES # BLD AUTO: 0.5 K/UL (ref 0.3–1)
MONOCYTES NFR BLD: 6.6 % (ref 4–15)
NEUTROPHILS # BLD AUTO: 5.1 K/UL (ref 1.8–7.7)
NEUTROPHILS NFR BLD: 68.8 % (ref 38–73)
NRBC BLD-RTO: 0 /100 WBC
PHENYTOIN SERPL-MCNC: <0.1 UG/ML (ref 10–20)
PLATELET # BLD AUTO: 204 K/UL (ref 150–450)
PMV BLD AUTO: 11.8 FL (ref 9.2–12.9)
POTASSIUM SERPL-SCNC: 3.4 MMOL/L (ref 3.5–5.1)
PROT SERPL-MCNC: 6.6 G/DL (ref 6–8.4)
RBC # BLD AUTO: 4.33 M/UL (ref 4.6–6.2)
SODIUM SERPL-SCNC: 142 MMOL/L (ref 136–145)
WBC # BLD AUTO: 7.47 K/UL (ref 3.9–12.7)

## 2024-09-04 PROCEDURE — 85025 COMPLETE CBC W/AUTO DIFF WBC: CPT | Performed by: EMERGENCY MEDICINE

## 2024-09-04 PROCEDURE — 80185 ASSAY OF PHENYTOIN TOTAL: CPT | Performed by: EMERGENCY MEDICINE

## 2024-09-04 PROCEDURE — 63600175 PHARM REV CODE 636 W HCPCS: Mod: JZ,JG | Performed by: EMERGENCY MEDICINE

## 2024-09-04 PROCEDURE — 93010 ELECTROCARDIOGRAM REPORT: CPT | Mod: ,,, | Performed by: STUDENT IN AN ORGANIZED HEALTH CARE EDUCATION/TRAINING PROGRAM

## 2024-09-04 PROCEDURE — 93005 ELECTROCARDIOGRAM TRACING: CPT

## 2024-09-04 PROCEDURE — 25000003 PHARM REV CODE 250: Performed by: EMERGENCY MEDICINE

## 2024-09-04 PROCEDURE — 99285 EMERGENCY DEPT VISIT HI MDM: CPT | Mod: 25

## 2024-09-04 PROCEDURE — 96365 THER/PROPH/DIAG IV INF INIT: CPT

## 2024-09-04 PROCEDURE — 80053 COMPREHEN METABOLIC PANEL: CPT | Performed by: EMERGENCY MEDICINE

## 2024-09-04 PROCEDURE — 96361 HYDRATE IV INFUSION ADD-ON: CPT

## 2024-09-04 RX ORDER — HYDRALAZINE HYDROCHLORIDE 100 MG/1
100 TABLET, FILM COATED ORAL 3 TIMES DAILY
Qty: 90 TABLET | Refills: 0 | Status: SHIPPED | OUTPATIENT
Start: 2024-09-04 | End: 2024-09-12 | Stop reason: SDUPTHER

## 2024-09-04 RX ORDER — CLONIDINE HYDROCHLORIDE 0.1 MG/1
0.1 TABLET ORAL 2 TIMES DAILY
Qty: 60 TABLET | Refills: 0 | Status: SHIPPED | OUTPATIENT
Start: 2024-09-04 | End: 2024-09-12 | Stop reason: SDUPTHER

## 2024-09-04 RX ORDER — METFORMIN HYDROCHLORIDE 500 MG/1
500 TABLET ORAL 2 TIMES DAILY WITH MEALS
Qty: 60 TABLET | Refills: 0 | Status: SHIPPED | OUTPATIENT
Start: 2024-09-04 | End: 2024-09-12 | Stop reason: ALTCHOICE

## 2024-09-04 RX ORDER — HYDRALAZINE HYDROCHLORIDE 25 MG/1
100 TABLET, FILM COATED ORAL
Status: COMPLETED | OUTPATIENT
Start: 2024-09-04 | End: 2024-09-04

## 2024-09-04 RX ORDER — CLONIDINE HYDROCHLORIDE 0.1 MG/1
0.1 TABLET ORAL
Status: COMPLETED | OUTPATIENT
Start: 2024-09-04 | End: 2024-09-04

## 2024-09-04 RX ORDER — PHENYTOIN SODIUM 200 MG/1
200 CAPSULE, EXTENDED RELEASE ORAL 3 TIMES DAILY
Qty: 90 CAPSULE | Refills: 0 | Status: SHIPPED | OUTPATIENT
Start: 2024-09-04 | End: 2024-09-12 | Stop reason: SDUPTHER

## 2024-09-04 RX ORDER — GABAPENTIN 300 MG/1
300 CAPSULE ORAL 3 TIMES DAILY
Qty: 60 CAPSULE | Refills: 0 | Status: SHIPPED | OUTPATIENT
Start: 2024-09-04 | End: 2024-09-25

## 2024-09-04 RX ORDER — HYDROCODONE BITARTRATE AND ACETAMINOPHEN 5; 325 MG/1; MG/1
1 TABLET ORAL EVERY 6 HOURS PRN
Qty: 12 TABLET | Refills: 0 | Status: SHIPPED | OUTPATIENT
Start: 2024-09-04

## 2024-09-04 RX ORDER — CARVEDILOL 12.5 MG/1
25 TABLET ORAL 2 TIMES DAILY
Status: DISCONTINUED | OUTPATIENT
Start: 2024-09-04 | End: 2024-09-04 | Stop reason: HOSPADM

## 2024-09-04 RX ADMIN — CLONIDINE HYDROCHLORIDE 0.1 MG: 0.1 TABLET ORAL at 01:09

## 2024-09-04 RX ADMIN — DEXTROSE MONOHYDRATE 1460 MG PE: 50 INJECTION, SOLUTION INTRAVENOUS at 01:09

## 2024-09-04 RX ADMIN — SODIUM CHLORIDE 500 ML: 9 INJECTION, SOLUTION INTRAVENOUS at 12:09

## 2024-09-04 RX ADMIN — HYDRALAZINE HYDROCHLORIDE 100 MG: 25 TABLET ORAL at 01:09

## 2024-09-04 NOTE — ED PROVIDER NOTES
SCRIBE #1 NOTE: I, Steve Shannon, am scribing for, and in the presence of, Abdiaziz Rivera MD. I have scribed the entire note.       History     Chief Complaint   Patient presents with    Seizures     Pt brought in by EMS for tonic clonic seizure witnessed by co-workers; noncompliant with meds, been out for about x 1 week ; wheelchair bound from previous stroke     Review of patient's allergies indicates:   Allergen Reactions    Potassium Hives, Itching and Other (See Comments)     Burning Sensation on Skin/ ONLY WHEN INJECTED NOT WHEN INGESTED          History of Present Illness     HPI    9/4/2024, 1:19 PM  History obtained from the patient      History of Present Illness: Vadim Devries III is a 49 y.o. male patient with a PMHx of heart failure, seizures, HTN, and DM who presents to the Emergency Department for evaluation of a seizure that occurred PTA.  Pt has been noncompliant with seizure and blood pressure medications for 2 months due to issues with insurance. Pt has no established PCP. Pt denies any smoking, EtOH, or drug history. Associated sxs include sleep disturbance, fatigue, and generalized myalgias. Patient denies any fever, congestion, headache, nausea, cough, and all other sxs at this time. No further complaints or concerns at this time.       Arrival mode: EMS     PCP: No, Primary Doctor        Past Medical History:  Past Medical History:   Diagnosis Date    Chronic combined systolic and diastolic heart failure 12/1/2021    Diabetes mellitus     Hypertension     Seizures     Stroke        Past Surgical History:  History reviewed. No pertinent surgical history.      Family History:  Family History   Problem Relation Name Age of Onset    No Known Problems Mother      No Known Problems Father         Social History:  Social History     Tobacco Use    Smoking status: Never    Smokeless tobacco: Never   Substance and Sexual Activity    Alcohol use: No    Drug use: No    Sexual activity: Not on  file        Review of Systems     Review of Systems   Constitutional:  Positive for fatigue. Negative for fever.   HENT:  Negative for congestion and sore throat.    Respiratory:  Negative for cough and shortness of breath.    Cardiovascular:  Negative for chest pain.   Gastrointestinal:  Negative for nausea.   Genitourinary:  Negative for dysuria.   Musculoskeletal:  Positive for myalgias (generalized). Negative for back pain.   Skin:  Negative for rash.   Neurological:  Positive for seizures. Negative for weakness and headaches.   Hematological:  Does not bruise/bleed easily.   Psychiatric/Behavioral:  Positive for sleep disturbance.    All other systems reviewed and are negative.       Physical Exam     Initial Vitals [09/04/24 1146]   BP Pulse Resp Temp SpO2   (!) 158/104 91 16 98.6 °F (37 °C) 98 %      MAP       --          Physical Exam  Nursing Notes and Vital Signs Reviewed.  Constitutional: Patient is in no apparent distress. Well-developed and well-nourished.  Head: Atraumatic. Normocephalic.  Eyes: PERRL. EOM intact. Conjunctivae are not pale. No scleral icterus.  ENT: Mucous membranes slightly dry. Oropharynx is clear and symmetric.    Neck: Supple. Full ROM. No lymphadenopathy.  Cardiovascular: Regular rate. Regular rhythm. No murmurs, rubs, or gallops. Distal pulses are 2+ and symmetric.  Pulmonary/Chest: No respiratory distress. Clear to auscultation bilaterally. No wheezing or rales.  Abdominal: Soft and non-distended.  There is no tenderness.  No rebound, guarding, or rigidity.  Genitourinary: No CVA tenderness  Musculoskeletal: Ambulates with a wheelchair. No obvious deformities. No edema.   Skin: Warm and dry.  Neurological:  Alert, awake, and appropriate.  Normal speech.  No acute focal neurological deficits from his baseline are appreciated.  Psychiatric: Normal affect. Good eye contact. Appropriate in content.     ED Course   Procedures  ED Vital Signs:  Vitals:    09/04/24 1146 09/04/24 1216  09/04/24 1234 09/04/24 1235   BP: (!) 158/104      Pulse: 91 89  87   Resp: 16      Temp: 98.6 °F (37 °C)      TempSrc: Oral      SpO2: 98%      Weight:   97.3 kg (214 lb 8.1 oz)    Height:   6' (1.829 m)     09/04/24 1332 09/04/24 1401 09/04/24 1420 09/04/24 1617   BP: (!) 189/103 (!) 162/87 (!) 96/56 (!) 140/74   Pulse: 83 92 79 87   Resp: 19 20 15 15   Temp:       TempSrc:       SpO2: 100% 100% 97% 99%   Weight:       Height:           Abnormal Lab Results:  Labs Reviewed   CBC W/ AUTO DIFFERENTIAL - Abnormal       Result Value    WBC 7.47      RBC 4.33 (*)     Hemoglobin 11.6 (*)     Hematocrit 35.2 (*)     MCV 81 (*)     MCH 26.8 (*)     MCHC 33.0      RDW 12.9      Platelets 204      MPV 11.8      Immature Granulocytes 0.4      Gran # (ANC) 5.1      Immature Grans (Abs) 0.03      Lymph # 1.6      Mono # 0.5      Eos # 0.1      Baso # 0.04      nRBC 0      Gran % 68.8      Lymph % 21.8      Mono % 6.6      Eosinophil % 1.9      Basophil % 0.5      Differential Method Automated     COMPREHENSIVE METABOLIC PANEL - Abnormal    Sodium 142      Potassium 3.4 (*)     Chloride 108      CO2 24      Glucose 207 (*)     BUN 25 (*)     Creatinine 2.7 (*)     Calcium 8.7      Total Protein 6.6      Albumin 3.2 (*)     Total Bilirubin 0.3      Alkaline Phosphatase 108      AST 15      ALT 16      eGFR 28 (*)     Anion Gap 10     PHENYTOIN LEVEL, TOTAL - Abnormal    Phenytoin Lvl <0.1 (*)         All Lab Results:  Results for orders placed or performed during the hospital encounter of 09/04/24   CBC auto differential   Result Value Ref Range    WBC 7.47 3.90 - 12.70 K/uL    RBC 4.33 (L) 4.60 - 6.20 M/uL    Hemoglobin 11.6 (L) 14.0 - 18.0 g/dL    Hematocrit 35.2 (L) 40.0 - 54.0 %    MCV 81 (L) 82 - 98 fL    MCH 26.8 (L) 27.0 - 31.0 pg    MCHC 33.0 32.0 - 36.0 g/dL    RDW 12.9 11.5 - 14.5 %    Platelets 204 150 - 450 K/uL    MPV 11.8 9.2 - 12.9 fL    Immature Granulocytes 0.4 0.0 - 0.5 %    Gran # (ANC) 5.1 1.8 - 7.7 K/uL     Immature Grans (Abs) 0.03 0.00 - 0.04 K/uL    Lymph # 1.6 1.0 - 4.8 K/uL    Mono # 0.5 0.3 - 1.0 K/uL    Eos # 0.1 0.0 - 0.5 K/uL    Baso # 0.04 0.00 - 0.20 K/uL    nRBC 0 0 /100 WBC    Gran % 68.8 38.0 - 73.0 %    Lymph % 21.8 18.0 - 48.0 %    Mono % 6.6 4.0 - 15.0 %    Eosinophil % 1.9 0.0 - 8.0 %    Basophil % 0.5 0.0 - 1.9 %    Differential Method Automated    Comprehensive metabolic panel   Result Value Ref Range    Sodium 142 136 - 145 mmol/L    Potassium 3.4 (L) 3.5 - 5.1 mmol/L    Chloride 108 95 - 110 mmol/L    CO2 24 23 - 29 mmol/L    Glucose 207 (H) 70 - 110 mg/dL    BUN 25 (H) 6 - 20 mg/dL    Creatinine 2.7 (H) 0.5 - 1.4 mg/dL    Calcium 8.7 8.7 - 10.5 mg/dL    Total Protein 6.6 6.0 - 8.4 g/dL    Albumin 3.2 (L) 3.5 - 5.2 g/dL    Total Bilirubin 0.3 0.1 - 1.0 mg/dL    Alkaline Phosphatase 108 55 - 135 U/L    AST 15 10 - 40 U/L    ALT 16 10 - 44 U/L    eGFR 28 (A) >60 mL/min/1.73 m^2    Anion Gap 10 8 - 16 mmol/L   Phenytoin level, total   Result Value Ref Range    Phenytoin Lvl <0.1 (L) 10.0 - 20.0 ug/mL   EKG 12-lead   Result Value Ref Range    QRS Duration 94 ms    [facility] QTC Calculation 466 ms         Imaging Results:  Imaging Results              X-Ray Chest AP Portable (Final result)  Result time 09/04/24 13:00:27      Final result by Mundo Garcia MD (09/04/24 13:00:27)                   Impression:      No acute process seen.      Electronically signed by: Mundo Garcia MD  Date:    09/04/2024  Time:    13:00               Narrative:    EXAMINATION:  XR CHEST AP PORTABLE    CLINICAL HISTORY:  Chest Pain;    FINDINGS:  Single view of the chest.  Comparison 02/17/2022    Cardiac silhouette is normal.  The lungs demonstrate no evidence of active disease.  No evidence of pleural effusion or pneumothorax.  Bones appear intact.                                       The EKG was ordered, reviewed, and independently interpreted by the ED provider.  Interpretation time: 13:00  Rate: 85 BPM  Rhythm: normal  sinus rhythm  Interpretation: Cannot rule out anterior infarct, age undetermined poor. No STEMI.             The Emergency Provider reviewed the vital signs and test results, which are outlined above.     ED Discussion       3:58 PM: Reassessed pt at this time. Discussed with pt all pertinent ED information and results. Discussed pt dx and plan of tx. Gave pt all f/u and return to the ED instructions. All questions and concerns were addressed at this time. Pt expresses understanding of information and instructions, and is comfortable with plan to discharge. Pt is stable for discharge.    I discussed with patient and/or family/caretaker that evaluation in the ED does not suggest any emergent or life threatening medical conditions requiring immediate intervention beyond what was provided in the ED, and I believe patient is safe for discharge.  Regardless, an unremarkable evaluation in the ED does not preclude the development or presence of a serious of life threatening condition. As such, patient was instructed to return immediately for any worsening or change in current symptoms.      ED Course as of 09/04/24 1640   Wed Sep 04, 2024   1632 Phenytoin Level Total(!): <0.1 [SUKUMAR]   1632 WBC: 7.47 [SUKUMAR]   1632 Hemoglobin(!): 11.6 [SUKUMAR]   1632 Hematocrit(!): 35.2 [SUKUMAR]   1632 Glucose(!): 207 [SUKUMAR]   1632 BUN(!): 25 [SUKUMAR]   1632 Creatinine(!): 2.7 [SUKUMAR]      ED Course User Index  [SUKUMAR] Abdiaziz Rivera MD     Medical Decision Making  Problems Addressed:  AMIRA (acute kidney injury): acute illness or injury that poses a threat to life or bodily functions  Hypertensive urgency: acute illness or injury that poses a threat to life or bodily functions  Noncompliance with medication regimen: chronic illness or injury with exacerbation, progression, or side effects of treatment  Primary hypertension: chronic illness or injury with exacerbation, progression, or side effects of treatment  Seizure: chronic illness or injury with exacerbation,  progression, or side effects of treatment    Amount and/or Complexity of Data Reviewed  Independent Historian: EMS     Details: States the patient has had stable vital signs and admits he has been noncompliant with his medications  Labs: ordered. Decision-making details documented in ED Course.  Radiology: ordered and independent interpretation performed. Decision-making details documented in ED Course.     Details: No obvious consolidation or pulmonary edema noted  ECG/medicine tests: ordered and independent interpretation performed. Decision-making details documented in ED Course.     Details: Poor R wave progression. Nonspecific ST wave anomalities.    Risk  Prescription drug management.  Decision regarding hospitalization.  Diagnosis or treatment significantly limited by social determinants of health.  Risk Details: Differential diagnosis includes noncompliance, substance abuse, sepsis, ACS, stroke, trauma, etc.    I discussed the patient's abnormal/worsening kidney function.  He did get 500 mL normal saline when offered further IV fluids and/or observation in the hospital however he is tolerating p.o. well and is very eager for discharge home.  He asked me to place a referral for primary care and he will call to arrange a follow up with the cardiology.  He is very comfortable with the treatment plan at this time-restarting his medications on an outpatient basis-and he will return emergency department immediately for any worsening signs or symptoms.                ED Medication(s):  Medications   carvediloL tablet 25 mg (has no administration in time range)   sodium chloride 0.9% bolus 500 mL 500 mL (0 mLs Intravenous Stopped 9/4/24 1418)   FOSphenytoin (CEREBYX) 1,460 mg PE in D5W 100 mL IVPB (0 mg PE Intravenous Stopped 9/4/24 1418)   hydrALAZINE tablet 100 mg (100 mg Oral Given 9/4/24 1334)   cloNIDine tablet 0.1 mg (0.1 mg Oral Given 9/4/24 1334)       New Prescriptions    CLONIDINE (CATAPRES) 0.1 MG TABLET     Take 1 tablet (0.1 mg total) by mouth 2 (two) times daily.    HYDROCODONE-ACETAMINOPHEN (NORCO) 5-325 MG PER TABLET    Take 1 tablet by mouth every 6 (six) hours as needed.        Follow-up Information       Your primary care physician.    Why: Call [phone] to arrange a follow up with an [facility] clinician             Shelly Brewer, ARBEN. Schedule an appointment as soon as possible for a visit in 1 week.    Specialty: Cardiology  Contact information:  [address] [place] [address] [address] [id number] [phone]                                 Scribe Attestation:   Scribe #1: I performed the above scribed service and the documentation accurately describes the services I performed. I attest to the accuracy of the note.     Attending:   Physician Attestation Statement for Scribe #1: IMiguel Jon M., MD, personally performed the services described in this documentation, as scribed by Steve Shannon, in my presence, and it is both accurate and complete.           Clinical Impression       ICD-10-CM ICD-9-CM   1. Seizure  R56.9 780.39   2. AMS (altered mental status)  R41.82 780.97   3. Noncompliance with medication regimen  Z91.148 V15.81   4. Primary hypertension  I10 401.9   5. Hypertensive urgency  I16.0 401.9   6. Chronic combined systolic and diastolic heart failure  I50.42 428.42   7. Hypertension associated with diabetes  E11.59 250.80    I15.2 401.9   8. Chronic HFrEF (heart failure with reduced ejection fraction)  I50.22 428.22   9. AMIRA (acute kidney injury)  N17.9 584.9       Disposition:   Disposition: Discharged  Condition: Stable         Abdiaziz Rivera MD  09/04/24 [id number]

## 2024-09-05 LAB
OHS QRS DURATION: 94 MS
OHS QTC CALCULATION: 466 MS

## 2024-09-12 ENCOUNTER — OFFICE VISIT (OUTPATIENT)
Dept: INTERNAL MEDICINE | Facility: CLINIC | Age: 49
End: 2024-09-12
Payer: COMMERCIAL

## 2024-09-12 DIAGNOSIS — I15.2 HYPERTENSION ASSOCIATED WITH DIABETES: ICD-10-CM

## 2024-09-12 DIAGNOSIS — E11.65 UNCONTROLLED TYPE 2 DIABETES MELLITUS WITH HYPERGLYCEMIA: ICD-10-CM

## 2024-09-12 DIAGNOSIS — M54.2 CERVICALGIA: ICD-10-CM

## 2024-09-12 DIAGNOSIS — N17.9 ACUTE KIDNEY INJURY: ICD-10-CM

## 2024-09-12 DIAGNOSIS — E11.9 TYPE 2 DIABETES MELLITUS WITHOUT COMPLICATION, UNSPECIFIED WHETHER LONG TERM INSULIN USE: ICD-10-CM

## 2024-09-12 DIAGNOSIS — G40.909 SEIZURE DISORDER: Primary | ICD-10-CM

## 2024-09-12 DIAGNOSIS — E11.59 HYPERTENSION ASSOCIATED WITH DIABETES: ICD-10-CM

## 2024-09-12 DIAGNOSIS — I50.22 CHRONIC HFREF (HEART FAILURE WITH REDUCED EJECTION FRACTION): ICD-10-CM

## 2024-09-12 DIAGNOSIS — I10 UNCONTROLLED HYPERTENSION: ICD-10-CM

## 2024-09-12 DIAGNOSIS — I50.42 CHRONIC COMBINED SYSTOLIC AND DIASTOLIC HEART FAILURE: ICD-10-CM

## 2024-09-12 DIAGNOSIS — E78.5 HYPERLIPIDEMIA ASSOCIATED WITH TYPE 2 DIABETES MELLITUS: ICD-10-CM

## 2024-09-12 DIAGNOSIS — E11.69 HYPERLIPIDEMIA ASSOCIATED WITH TYPE 2 DIABETES MELLITUS: ICD-10-CM

## 2024-09-12 PROCEDURE — 1160F RVW MEDS BY RX/DR IN RCRD: CPT | Mod: CPTII,95,, | Performed by: PHYSICIAN ASSISTANT

## 2024-09-12 PROCEDURE — G2211 COMPLEX E/M VISIT ADD ON: HCPCS | Mod: 95,,, | Performed by: PHYSICIAN ASSISTANT

## 2024-09-12 PROCEDURE — 4010F ACE/ARB THERAPY RXD/TAKEN: CPT | Mod: CPTII,95,, | Performed by: PHYSICIAN ASSISTANT

## 2024-09-12 PROCEDURE — 99214 OFFICE O/P EST MOD 30 MIN: CPT | Mod: 95,,, | Performed by: PHYSICIAN ASSISTANT

## 2024-09-12 PROCEDURE — 1159F MED LIST DOCD IN RCRD: CPT | Mod: CPTII,95,, | Performed by: PHYSICIAN ASSISTANT

## 2024-09-12 RX ORDER — METFORMIN HYDROCHLORIDE 500 MG/1
1000 TABLET, EXTENDED RELEASE ORAL
Qty: 180 TABLET | Refills: 1 | Status: SHIPPED | OUTPATIENT
Start: 2024-09-12 | End: 2025-03-11

## 2024-09-12 RX ORDER — CARVEDILOL 25 MG/1
25 TABLET ORAL 2 TIMES DAILY
Qty: 180 TABLET | Refills: 3 | Status: SHIPPED | OUTPATIENT
Start: 2024-09-12 | End: 2025-09-07

## 2024-09-12 RX ORDER — CLONIDINE HYDROCHLORIDE 0.1 MG/1
0.1 TABLET ORAL 2 TIMES DAILY
Qty: 60 TABLET | Refills: 1 | Status: SHIPPED | OUTPATIENT
Start: 2024-09-12 | End: 2024-11-11

## 2024-09-12 RX ORDER — HYDRALAZINE HYDROCHLORIDE 100 MG/1
100 TABLET, FILM COATED ORAL 3 TIMES DAILY
Qty: 270 TABLET | Refills: 1 | Status: SHIPPED | OUTPATIENT
Start: 2024-09-12 | End: 2025-03-11

## 2024-09-12 RX ORDER — AMLODIPINE BESYLATE 10 MG/1
10 TABLET ORAL DAILY
Qty: 90 TABLET | Refills: 3 | Status: SHIPPED | OUTPATIENT
Start: 2024-09-12 | End: 2025-09-12

## 2024-09-12 RX ORDER — SEMAGLUTIDE 0.68 MG/ML
0.25 INJECTION, SOLUTION SUBCUTANEOUS
Qty: 3 ML | Refills: 1 | Status: SHIPPED | OUTPATIENT
Start: 2024-09-12

## 2024-09-12 RX ORDER — ATORVASTATIN CALCIUM 40 MG/1
40 TABLET, FILM COATED ORAL NIGHTLY
Qty: 90 TABLET | Refills: 4 | Status: SHIPPED | OUTPATIENT
Start: 2024-09-12

## 2024-09-12 RX ORDER — PHENYTOIN SODIUM 200 MG/1
200 CAPSULE, EXTENDED RELEASE ORAL 3 TIMES DAILY
Qty: 270 CAPSULE | Refills: 1 | Status: SHIPPED | OUTPATIENT
Start: 2024-09-12 | End: 2025-03-11

## 2024-09-12 RX ORDER — LIDOCAINE 50 MG/G
1 PATCH TOPICAL DAILY
Qty: 5 PATCH | Refills: 1 | Status: SHIPPED | OUTPATIENT
Start: 2024-09-12

## 2024-09-12 RX ORDER — DAPAGLIFLOZIN 10 MG/1
10 TABLET, FILM COATED ORAL DAILY
Qty: 90 TABLET | Refills: 3 | Status: SHIPPED | OUTPATIENT
Start: 2024-09-12

## 2024-09-12 RX ORDER — METHOCARBAMOL 500 MG/1
500 TABLET, FILM COATED ORAL 4 TIMES DAILY PRN
Qty: 20 TABLET | Refills: 0 | Status: SHIPPED | OUTPATIENT
Start: 2024-09-12 | End: 2024-09-17

## 2024-09-12 NOTE — PROGRESS NOTES
The patient location is: [place], [place]  The chief complaint leading to consultation is: ER follow up    Visit type: audiovisual    Face to Face time with patient: 40 min  45 minutes of total time spent on the encounter, which includes face to face time and non-face to face time preparing to see the patient (eg, review of tests), Obtaining and/or reviewing separately obtained history, Documenting clinical information in the electronic or other health record, Independently interpreting results (not separately reported) and communicating results to the patient/family/caregiver, or Care coordination (not separately reported).         Each patient to whom he or she provides medical services by telemedicine is:  (1) informed of the relationship between the physician and patient and the respective role of any other health care provider with respect to management of the patient; and (2) notified that he or she may decline to receive medical services by telemedicine and may withdraw from such care at any time.    Notes:    Subjective:      Patient ID: Vadim Devries III is a 49 y.o. male.    Chief Complaint: No chief complaint on file.    HPI  Here today for an ED follow up.   ED on 9/4 for eval of seizure. PMHx of heart failure, seizures, HTN, and DM. Pt has been noncompliant with seizure and blood pressure medications for 2 months due to issues with insurance. Pt has no established PCP.   Overdue for cardiology follow up. Would like to see a different cardiologist in [facility]. Pt states that he has been having communication issues.   Admits to trouble with transportation so he has frequent missed appointments. He does not drive.  Would like to get established with new pcp here at the [place]. Ok with seeing myself and Dr. Wooten.   Needs renal functions rechecked. Decline on labs done in the ER. Pt admits to nsaid use.   DM not controlled last A1c 5/2023 8.8. Pt currently on metformin. Inconsistent with farxiga.  Never started the ozempic bc fear of SE.  Hasn't been checking his blood sugar because it gives him anxiety.     Seizures were previously controlled on the phenytoin. Hasn't seen neurology in years. He is back on his medications. A short supply was given by the ER.   BP not controlled. BP has gone up to 200/132. He hasn't been taking the coreg because he ran out. He states that he has been taking clonidine twice daily, hydralazine, and amlodipine.     Reports severe fatigue, shortness of breath, and cough for the past couple week.     He has restricted his fluids due to his heart failure. Denies lower extremity edema.    Pain in the left side of his neck. He takes advil for this. He has not done pt/ot.     Patient Active Problem List   Diagnosis    Diabetes mellitus    Hypertension associated with diabetes    Seizure disorder    History of stroke    Paralysis    Chronic combined systolic and diastolic heart failure    Stage 3a chronic kidney disease         Current Outpatient Medications:     amLODIPine (NORVASC) 10 MG tablet, Take 1 tablet (10 mg total) by mouth once daily., Disp: 90 tablet, Rfl: 3    aspirin 81 MG Chew, Take 81 mg by mouth., Disp: , Rfl:     atorvastatin (LIPITOR) 40 MG tablet, Take 1 tablet (40 mg total) by mouth every evening., Disp: 90 tablet, Rfl: 4    blood-glucose meter,continuous (DEXCOM G7 ) Misc, 1 Device by Misc.(Non-Drug; Combo Route) route once daily., Disp: 1 each, Rfl: 0    blood-glucose sensor (DEXCOM G7 SENSOR) Milly, 1 Device by Misc.(Non-Drug; Combo Route) route every 10 days., Disp: 3 each, Rfl: 11    carvediloL (COREG) 25 MG tablet, Take 1 tablet (25 mg total) by mouth 2 (two) times daily., Disp: 180 tablet, Rfl: 3    cloNIDine (CATAPRES) 0.1 MG tablet, Take 1 tablet (0.1 mg total) by mouth 2 (two) times daily., Disp: 60 tablet, Rfl: 1    dapagliflozin propanediol (FARXIGA) 10 mg tablet, Take 1 tablet (10 mg total) by mouth once daily., Disp: 90 tablet, Rfl: 3    doxepin  "(SINEQUAN) 25 MG capsule, Take 1 capsule (25 mg total) by mouth every evening., Disp: 30 capsule, Rfl: 0    gabapentin (NEURONTIN) 300 MG capsule, Take 1 capsule (300 mg total) by mouth 3 (three) times daily. for 60 doses, Disp: 60 capsule, Rfl: 0    hydrALAZINE (APRESOLINE) 100 MG tablet, Take 1 tablet (100 mg total) by mouth 3 (three) times daily., Disp: 270 tablet, Rfl: 1    HYDROcodone-acetaminophen (NORCO) 5-325 mg per tablet, Take 1 tablet by mouth every 6 (six) hours as needed., Disp: 12 tablet, Rfl: 0    isosorbide dinitrate (ISORDIL) 40 MG Tab, Take 1 tablet (40 mg total) by mouth 2 (two) times daily., Disp: 180 tablet, Rfl: 3    LIDOcaine (LIDODERM) 5 %, Place 1 patch onto the skin once daily. Remove & Discard patch within 12 hours or as directed by MD, Disp: 5 patch, Rfl: 1    metFORMIN (GLUCOPHAGE-XR) 500 MG ER 24hr tablet, Take 2 tablets (1,000 mg total) by mouth daily with breakfast., Disp: 180 tablet, Rfl: 1    methocarbamoL (ROBAXIN) 500 MG Tab, Take 1 tablet (500 mg total) by mouth 4 (four) times daily as needed (neck pain)., Disp: 20 tablet, Rfl: 0    pen needle, diabetic (BD ULTRA-FINE LUI PEN NEEDLE) 32 gauge x 5/32" Ndle, Use with insulin 3 times daily, Disp: 100 each, Rfl: 5    phenytoin (DILANTIN) 200 MG ER capsule, Take 1 capsule (200 mg total) by mouth 3 (three) times daily., Disp: 270 capsule, Rfl: 1    sacubitriL-valsartan (ENTRESTO)  mg per tablet, Take 1 tablet by mouth 2 (two) times daily., Disp: 180 tablet, Rfl: 0    semaglutide (OZEMPIC) 0.25 mg or 0.5 mg (2 mg/3 mL) pen injector, Inject 0.25 mg into the skin every 7 days., Disp: 3 mL, Rfl: 1    torsemide (DEMADEX) 20 MG Tab, Take 3 tablets (60 mg total) by mouth once daily., Disp: 90 tablet, Rfl: 4    Review of Systems   Constitutional:  Negative for activity change, appetite change, chills, diaphoresis, fatigue, fever and unexpected weight change.   HENT: Negative.  Negative for congestion, hearing loss, postnasal drip, " rhinorrhea, sore throat, trouble swallowing and voice change.    Eyes: Negative.  Negative for visual disturbance.   Respiratory: Negative.  Negative for cough, choking, chest tightness and shortness of breath.    Cardiovascular:  Negative for chest pain, palpitations and leg swelling.   Gastrointestinal:  Negative for abdominal distention, abdominal pain, blood in stool, constipation, diarrhea, nausea and vomiting.   Endocrine: Negative for cold intolerance, heat intolerance, polydipsia and polyuria.   Genitourinary: Negative.  Negative for difficulty urinating and frequency.   Musculoskeletal:  Positive for myalgias, neck pain and neck stiffness. Negative for arthralgias, back pain, gait problem and joint swelling.   Skin:  Negative for color change, pallor, rash and wound.   Neurological:  Negative for dizziness, tremors, light-headedness, numbness and headaches.   Hematological:  Negative for adenopathy.   Psychiatric/Behavioral:  Negative for behavioral problems, confusion, self-injury, sleep disturbance and suicidal ideas. The patient is not nervous/anxious.      Objective:   There were no vitals taken for this visit.    Physical Exam  Constitutional:       General: He is not in acute distress.     Appearance: Normal appearance. He is not ill-appearing, toxic-appearing or diaphoretic.   HENT:      Head: Normocephalic and atraumatic.   Pulmonary:      Effort: Pulmonary effort is normal. No respiratory distress.      Breath sounds: Normal breath sounds.   Neurological:      Mental Status: He is alert and oriented to person, place, and time.   Psychiatric:         Mood and Affect: Mood normal.         Behavior: Behavior normal.         Thought Content: Thought content normal.         Judgment: Judgment normal.       Admission on 09/04/2024, Discharged on 09/04/2024   Component Date Value Ref Range Status    QRS Duration 09/04/2024 94  ms Final    [facility] QTC Calculation 09/04/2024 466  ms Final    WBC 09/04/2024 7.47   3.90 - 12.70 K/uL Final    RBC 09/04/2024 4.33 (L)  4.60 - 6.20 M/uL Final    Hemoglobin 09/04/2024 11.6 (L)  14.0 - 18.0 g/dL Final    Hematocrit 09/04/2024 35.2 (L)  40.0 - 54.0 % Final    MCV 09/04/2024 81 (L)  82 - 98 fL Final    MCH 09/04/2024 26.8 (L)  27.0 - 31.0 pg Final    MCHC 09/04/2024 33.0  32.0 - 36.0 g/dL Final    RDW 09/04/2024 12.9  11.5 - 14.5 % Final    Platelets 09/04/2024 204  150 - 450 K/uL Final    MPV 09/04/2024 11.8  9.2 - 12.9 fL Final    Immature Granulocytes 09/04/2024 0.4  0.0 - 0.5 % Final    Gran # (ANC) 09/04/2024 5.1  1.8 - 7.7 K/uL Final    Immature Grans (Abs) 09/04/2024 0.03  0.00 - 0.04 K/uL Final    Comment: Mild elevation in immature granulocytes is non specific and   can be seen in a variety of conditions including stress response,   acute inflammation, trauma and pregnancy. Correlation with other   laboratory and clinical findings is essential.      Lymph # 09/04/2024 1.6  1.0 - 4.8 K/uL Final    Mono # 09/04/2024 0.5  0.3 - 1.0 K/uL Final    Eos # 09/04/2024 0.1  0.0 - 0.5 K/uL Final    Baso # 09/04/2024 0.04  0.00 - 0.20 K/uL Final    nRBC 09/04/2024 0  0 /100 WBC Final    Gran % 09/04/2024 68.8  38.0 - 73.0 % Final    Lymph % 09/04/2024 21.8  18.0 - 48.0 % Final    Mono % 09/04/2024 6.6  4.0 - 15.0 % Final    Eosinophil % 09/04/2024 1.9  0.0 - 8.0 % Final    Basophil % 09/04/2024 0.5  0.0 - 1.9 % Final    Differential Method 09/04/2024 Automated   Final    Sodium 09/04/2024 142  136 - 145 mmol/L Final    Potassium 09/04/2024 3.4 (L)  3.5 - 5.1 mmol/L Final    Chloride 09/04/2024 108  95 - 110 mmol/L Final    CO2 09/04/2024 24  23 - 29 mmol/L Final    Glucose 09/04/2024 207 (H)  70 - 110 mg/dL Final    BUN 09/04/2024 25 (H)  6 - 20 mg/dL Final    Creatinine 09/04/2024 2.7 (H)  0.5 - 1.4 mg/dL Final    Calcium 09/04/2024 8.7  8.7 - 10.5 mg/dL Final    Total Protein 09/04/2024 6.6  6.0 - 8.4 g/dL Final    Albumin 09/04/2024 3.2 (L)  3.5 - 5.2 g/dL Final    Total Bilirubin  09/04/2024 0.3  0.1 - 1.0 mg/dL Final    Comment: For infants and newborns, interpretation of results should be based  on gestational age, weight and in agreement with clinical  observations.    Premature Infant recommended reference ranges:  Up to 24 hours.............<8.0 mg/dL  Up to 48 hours............<12.0 mg/dL  3-5 days..................<15.0 mg/dL  6-29 days.................<15.0 mg/dL      Alkaline Phosphatase 09/04/2024 108  55 - 135 U/L Final    AST 09/04/2024 15  10 - 40 U/L Final    ALT 09/04/2024 16  10 - 44 U/L Final    eGFR 09/04/2024 28 (A)  >60 mL/min/1.73 m^2 Final    Anion Gap 09/04/2024 10  8 - 16 mmol/L Final    Phenytoin Lvl 09/04/2024 <0.1 (L)  10.0 - 20.0 ug/mL Final     Lab Results   Component Value Date    HGBA1C 8.8 (H) 05/02/2023       Assessment:     1. Seizure disorder    2. Acute kidney injury    3. Uncontrolled type 2 diabetes mellitus with hyperglycemia    4. Chronic combined systolic and diastolic heart failure    5. Uncontrolled hypertension    6. Hyperlipidemia associated with type 2 diabetes mellitus    7. Hypertension associated with diabetes    8. Chronic HFrEF (heart failure with reduced ejection fraction)    9. Type 2 diabetes mellitus without complication, unspecified whether long term insulin use    10. Cervicalgia      Plan:   Seizure disorder  -     CBC Auto Differential; Future; Expected date: 09/12/2024  -     phenytoin (DILANTIN) 200 MG ER capsule; Take 1 capsule (200 mg total) by mouth 3 (three) times daily.  Dispense: 270 capsule; Refill: 1  -     Ambulatory referral/consult to Neurology; Future; Expected date: 09/19/2024  -needs follow up with neuro  -cont phenytoin as prescribed.     Acute kidney injury  -recheck labs  -avoid all nsaids.   -32 ounces of water daily    Uncontrolled type 2 diabetes mellitus with hyperglycemia  -     Comprehensive Metabolic Panel; Future; Expected date: 09/12/2024  -     Hemoglobin A1C; Future; Expected date: 09/12/2024  -      Microalbumin/Creatinine Ratio, Urine; Future; Expected date: 09/12/2024  -     semaglutide (OZEMPIC) 0.25 mg or 0.5 mg (2 mg/3 mL) pen injector; Inject 0.25 mg into the skin every 7 days.  Dispense: 3 mL; Refill: 1  -     dapagliflozin propanediol (FARXIGA) 10 mg tablet; Take 1 tablet (10 mg total) by mouth once daily.  Dispense: 90 tablet; Refill: 3  -     metFORMIN (GLUCOPHAGE-XR) 500 MG ER 24hr tablet; Take 2 tablets (1,000 mg total) by mouth daily with breakfast.  Dispense: 180 tablet; Refill: 1  -start ozempic.   -cont metformin and farxiga daily as prescribed.     Chronic combined systolic and diastolic heart failure  -     Ambulatory referral/consult to Cardiology  -     carvediloL (COREG) 25 MG tablet; Take 1 tablet (25 mg total) by mouth 2 (two) times daily.  Dispense: 180 tablet; Refill: 3  -     dapagliflozin propanediol (FARXIGA) 10 mg tablet; Take 1 tablet (10 mg total) by mouth once daily.  Dispense: 90 tablet; Refill: 3  -     hydrALAZINE (APRESOLINE) 100 MG tablet; Take 1 tablet (100 mg total) by mouth 3 (three) times daily.  Dispense: 270 tablet; Refill: 1  -     sacubitriL-valsartan (ENTRESTO)  mg per tablet; Take 1 tablet by mouth 2 (two) times daily.  Dispense: 180 tablet; Refill: 0  -follow up with cardiology.     Uncontrolled hypertension  -     CBC Auto Differential; Future; Expected date: 09/12/2024  -     TSH; Future  -     Ambulatory referral/consult to Cardiology  -     amLODIPine (NORVASC) 10 MG tablet; Take 1 tablet (10 mg total) by mouth once daily.  Dispense: 90 tablet; Refill: 3    Hyperlipidemia associated with type 2 diabetes mellitus  -     Lipid Panel; Future; Expected date: 09/12/2024  -     atorvastatin (LIPITOR) 40 MG tablet; Take 1 tablet (40 mg total) by mouth every evening.  Dispense: 90 tablet; Refill: 4    Hypertension associated with diabetes  -     Ambulatory referral/consult to Internal Medicine  -     amLODIPine (NORVASC) 10 MG tablet; Take 1 tablet (10 mg total)  by mouth once daily.  Dispense: 90 tablet; Refill: 3  -     cloNIDine (CATAPRES) 0.1 MG tablet; Take 1 tablet (0.1 mg total) by mouth 2 (two) times daily.  Dispense: 60 tablet; Refill: 1  -     hydrALAZINE (APRESOLINE) 100 MG tablet; Take 1 tablet (100 mg total) by mouth 3 (three) times daily.  Dispense: 270 tablet; Refill: 1  -     sacubitriL-valsartan (ENTRESTO)  mg per tablet; Take 1 tablet by mouth 2 (two) times daily.  Dispense: 180 tablet; Refill: 0    Chronic HFrEF (heart failure with reduced ejection fraction)  -     dapagliflozin propanediol (FARXIGA) 10 mg tablet; Take 1 tablet (10 mg total) by mouth once daily.  Dispense: 90 tablet; Refill: 3  -     sacubitriL-valsartan (ENTRESTO)  mg per tablet; Take 1 tablet by mouth 2 (two) times daily.  Dispense: 180 tablet; Refill: 0    Type 2 diabetes mellitus without complication, unspecified whether long term insulin use    Cervicalgia  -     methocarbamoL (ROBAXIN) 500 MG Tab; Take 1 tablet (500 mg total) by mouth 4 (four) times daily as needed (neck pain).  Dispense: 20 tablet; Refill: 0  -     LIDOcaine (LIDODERM) 5 %; Place 1 patch onto the skin once daily. Remove & Discard patch within 12 hours or as directed by MD  Dispense: 5 patch; Refill: 1    -stretches/exercises sent to his patient portal.   -AVOID ALL NSAIDS.   -TYLENOL AS NEEDED FOR PAIN  -robaxin can be sedating. If too sedating during the day ok to take 2 at night.       -advised that he needs a soon apt with cardiology.   -restart coreg. Continue hydralazine 100 TID, clonidine 0.1 BID, entresto, and amlodipine.   -ER if bp over 205 systolic or over 110 diastolic.     Follow up in about 2 weeks (around 9/26/2024), or if symptoms worsen or fail to improve.

## 2024-11-06 ENCOUNTER — TELEPHONE (OUTPATIENT)
Dept: NEUROLOGY | Facility: CLINIC | Age: 49
End: 2024-11-06
Payer: COMMERCIAL

## 2024-11-06 NOTE — TELEPHONE ENCOUNTER
Left patient a voicemail on trying to reschedule appointment on 11/29 due to Dr. Sharif being out.

## 2024-11-07 ENCOUNTER — TELEPHONE (OUTPATIENT)
Dept: NEUROLOGY | Facility: CLINIC | Age: 49
End: 2024-11-07
Payer: COMMERCIAL

## 2024-11-07 NOTE — TELEPHONE ENCOUNTER
Left patient a voicemail on trying to reschedule appointment on 11/29 due to Dr. Sharif being out.

## 2024-11-08 ENCOUNTER — TELEPHONE (OUTPATIENT)
Dept: NEUROLOGY | Facility: CLINIC | Age: 49
End: 2024-11-08
Payer: COMMERCIAL

## 2025-06-25 DIAGNOSIS — E11.65 UNCONTROLLED TYPE 2 DIABETES MELLITUS WITH HYPERGLYCEMIA: ICD-10-CM

## 2025-06-25 RX ORDER — SEMAGLUTIDE 0.68 MG/ML
0.25 INJECTION, SOLUTION SUBCUTANEOUS
Qty: 3 ML | Refills: 1 | OUTPATIENT
Start: 2025-06-25